# Patient Record
Sex: FEMALE | Race: BLACK OR AFRICAN AMERICAN | NOT HISPANIC OR LATINO | URBAN - METROPOLITAN AREA
[De-identification: names, ages, dates, MRNs, and addresses within clinical notes are randomized per-mention and may not be internally consistent; named-entity substitution may affect disease eponyms.]

---

## 2017-03-05 ENCOUNTER — EMERGENCY (EMERGENCY)
Facility: HOSPITAL | Age: 47
LOS: 1 days | Discharge: HOME | End: 2017-03-05
Attending: EMERGENCY MEDICINE
Payer: MEDICAID

## 2017-03-05 VITALS
HEART RATE: 81 BPM | RESPIRATION RATE: 16 BRPM | DIASTOLIC BLOOD PRESSURE: 82 MMHG | SYSTOLIC BLOOD PRESSURE: 132 MMHG | OXYGEN SATURATION: 99 % | TEMPERATURE: 98 F

## 2017-03-05 VITALS
WEIGHT: 199.96 LBS | TEMPERATURE: 98 F | HEIGHT: 62 IN | DIASTOLIC BLOOD PRESSURE: 80 MMHG | RESPIRATION RATE: 20 BRPM | OXYGEN SATURATION: 99 % | SYSTOLIC BLOOD PRESSURE: 150 MMHG | HEART RATE: 83 BPM

## 2017-03-05 DIAGNOSIS — R10.13 EPIGASTRIC PAIN: ICD-10-CM

## 2017-03-05 DIAGNOSIS — Z98.89 OTHER SPECIFIED POSTPROCEDURAL STATES: Chronic | ICD-10-CM

## 2017-03-05 DIAGNOSIS — K29.70 GASTRITIS, UNSPECIFIED, WITHOUT BLEEDING: ICD-10-CM

## 2017-03-05 DIAGNOSIS — Z98.51 TUBAL LIGATION STATUS: Chronic | ICD-10-CM

## 2017-03-05 DIAGNOSIS — Z88.8 ALLERGY STATUS TO OTHER DRUGS, MEDICAMENTS AND BIOLOGICAL SUBSTANCES STATUS: ICD-10-CM

## 2017-03-05 DIAGNOSIS — F32.9 MAJOR DEPRESSIVE DISORDER, SINGLE EPISODE, UNSPECIFIED: ICD-10-CM

## 2017-03-05 LAB
ALBUMIN SERPL ELPH-MCNC: 4.1 G/DL — SIGNIFICANT CHANGE UP (ref 3.3–5.2)
ALP SERPL-CCNC: 120 U/L — SIGNIFICANT CHANGE UP (ref 40–120)
ALT FLD-CCNC: 20 U/L — SIGNIFICANT CHANGE UP
ANION GAP SERPL CALC-SCNC: 13 MMOL/L — SIGNIFICANT CHANGE UP (ref 5–17)
APPEARANCE UR: CLEAR — SIGNIFICANT CHANGE UP
AST SERPL-CCNC: 20 U/L — SIGNIFICANT CHANGE UP
BACTERIA # UR AUTO: ABNORMAL
BASOPHILS # BLD AUTO: 0 K/UL — SIGNIFICANT CHANGE UP (ref 0–0.2)
BASOPHILS NFR BLD AUTO: 0.7 % — SIGNIFICANT CHANGE UP (ref 0–2)
BILIRUB SERPL-MCNC: 0.2 MG/DL — LOW (ref 0.4–2)
BILIRUB UR-MCNC: NEGATIVE — SIGNIFICANT CHANGE UP
BUN SERPL-MCNC: 11 MG/DL — SIGNIFICANT CHANGE UP (ref 8–20)
CALCIUM SERPL-MCNC: 9.4 MG/DL — SIGNIFICANT CHANGE UP (ref 8.6–10.2)
CHLORIDE SERPL-SCNC: 103 MMOL/L — SIGNIFICANT CHANGE UP (ref 98–107)
CO2 SERPL-SCNC: 25 MMOL/L — SIGNIFICANT CHANGE UP (ref 22–29)
COLOR SPEC: YELLOW — SIGNIFICANT CHANGE UP
CREAT SERPL-MCNC: 0.79 MG/DL — SIGNIFICANT CHANGE UP (ref 0.5–1.3)
DIFF PNL FLD: NEGATIVE — SIGNIFICANT CHANGE UP
EOSINOPHIL # BLD AUTO: 0.3 K/UL — SIGNIFICANT CHANGE UP (ref 0–0.5)
EOSINOPHIL NFR BLD AUTO: 3.9 % — SIGNIFICANT CHANGE UP (ref 0–6)
EPI CELLS # UR: SIGNIFICANT CHANGE UP
GLUCOSE SERPL-MCNC: 114 MG/DL — SIGNIFICANT CHANGE UP (ref 70–115)
GLUCOSE UR QL: NEGATIVE MG/DL — SIGNIFICANT CHANGE UP
HCG UR QL: NEGATIVE — SIGNIFICANT CHANGE UP
HCT VFR BLD CALC: 37.2 % — SIGNIFICANT CHANGE UP (ref 37–47)
HGB BLD-MCNC: 11.4 G/DL — LOW (ref 12–16)
KETONES UR-MCNC: NEGATIVE — SIGNIFICANT CHANGE UP
LEUKOCYTE ESTERASE UR-ACNC: ABNORMAL
LIDOCAIN IGE QN: 48 U/L — SIGNIFICANT CHANGE UP (ref 22–51)
LYMPHOCYTES # BLD AUTO: 1.3 K/UL — SIGNIFICANT CHANGE UP (ref 1–4.8)
LYMPHOCYTES # BLD AUTO: 18.7 % — LOW (ref 20–55)
MCHC RBC-ENTMCNC: 23.8 PG — LOW (ref 27–31)
MCHC RBC-ENTMCNC: 30.6 G/DL — LOW (ref 32–36)
MCV RBC AUTO: 77.7 FL — LOW (ref 81–99)
MONOCYTES # BLD AUTO: 0.8 K/UL — SIGNIFICANT CHANGE UP (ref 0–0.8)
MONOCYTES NFR BLD AUTO: 11.5 % — HIGH (ref 3–10)
NEUTROPHILS # BLD AUTO: 4.5 K/UL — SIGNIFICANT CHANGE UP (ref 1.8–8)
NEUTROPHILS NFR BLD AUTO: 65.1 % — SIGNIFICANT CHANGE UP (ref 37–73)
NITRITE UR-MCNC: NEGATIVE — SIGNIFICANT CHANGE UP
PH UR: 7 — SIGNIFICANT CHANGE UP (ref 4.8–8)
PLATELET # BLD AUTO: 364 K/UL — SIGNIFICANT CHANGE UP (ref 150–400)
POTASSIUM SERPL-MCNC: 4.3 MMOL/L — SIGNIFICANT CHANGE UP (ref 3.5–5.3)
POTASSIUM SERPL-SCNC: 4.3 MMOL/L — SIGNIFICANT CHANGE UP (ref 3.5–5.3)
PROT SERPL-MCNC: 7.5 G/DL — SIGNIFICANT CHANGE UP (ref 6.6–8.7)
PROT UR-MCNC: NEGATIVE MG/DL — SIGNIFICANT CHANGE UP
RBC # BLD: 4.79 M/UL — SIGNIFICANT CHANGE UP (ref 4.4–5.2)
RBC # FLD: 18.7 % — HIGH (ref 11–15.6)
SODIUM SERPL-SCNC: 141 MMOL/L — SIGNIFICANT CHANGE UP (ref 135–145)
SP GR SPEC: 1.01 — SIGNIFICANT CHANGE UP (ref 1.01–1.02)
UROBILINOGEN FLD QL: NEGATIVE MG/DL — SIGNIFICANT CHANGE UP
WBC # BLD: 6.9 K/UL — SIGNIFICANT CHANGE UP (ref 4.8–10.8)
WBC # FLD AUTO: 6.9 K/UL — SIGNIFICANT CHANGE UP (ref 4.8–10.8)
WBC UR QL: SIGNIFICANT CHANGE UP

## 2017-03-05 PROCEDURE — 76705 ECHO EXAM OF ABDOMEN: CPT | Mod: 26

## 2017-03-05 PROCEDURE — 81025 URINE PREGNANCY TEST: CPT

## 2017-03-05 PROCEDURE — 80053 COMPREHEN METABOLIC PANEL: CPT

## 2017-03-05 PROCEDURE — 81001 URINALYSIS AUTO W/SCOPE: CPT

## 2017-03-05 PROCEDURE — 76705 ECHO EXAM OF ABDOMEN: CPT

## 2017-03-05 PROCEDURE — 99284 EMERGENCY DEPT VISIT MOD MDM: CPT | Mod: 25

## 2017-03-05 PROCEDURE — 96376 TX/PRO/DX INJ SAME DRUG ADON: CPT | Mod: XU

## 2017-03-05 PROCEDURE — 83690 ASSAY OF LIPASE: CPT

## 2017-03-05 PROCEDURE — 96375 TX/PRO/DX INJ NEW DRUG ADDON: CPT | Mod: XU

## 2017-03-05 PROCEDURE — 74177 CT ABD & PELVIS W/CONTRAST: CPT

## 2017-03-05 PROCEDURE — 99285 EMERGENCY DEPT VISIT HI MDM: CPT

## 2017-03-05 PROCEDURE — 74177 CT ABD & PELVIS W/CONTRAST: CPT | Mod: 26

## 2017-03-05 PROCEDURE — 96374 THER/PROPH/DIAG INJ IV PUSH: CPT | Mod: XU

## 2017-03-05 PROCEDURE — 85027 COMPLETE CBC AUTOMATED: CPT

## 2017-03-05 RX ORDER — MORPHINE SULFATE 50 MG/1
6 CAPSULE, EXTENDED RELEASE ORAL ONCE
Qty: 0 | Refills: 0 | Status: DISCONTINUED | OUTPATIENT
Start: 2017-03-05 | End: 2017-03-05

## 2017-03-05 RX ORDER — FAMOTIDINE 10 MG/ML
20 INJECTION INTRAVENOUS ONCE
Qty: 0 | Refills: 0 | Status: COMPLETED | OUTPATIENT
Start: 2017-03-05 | End: 2017-03-05

## 2017-03-05 RX ORDER — SUCRALFATE 1 G
10 TABLET ORAL
Qty: 300 | Refills: 0
Start: 2017-03-05 | End: 2017-03-15

## 2017-03-05 RX ORDER — SODIUM CHLORIDE 9 MG/ML
3 INJECTION INTRAMUSCULAR; INTRAVENOUS; SUBCUTANEOUS ONCE
Qty: 0 | Refills: 0 | Status: COMPLETED | OUTPATIENT
Start: 2017-03-05 | End: 2017-03-05

## 2017-03-05 RX ORDER — ONDANSETRON 8 MG/1
4 TABLET, FILM COATED ORAL ONCE
Qty: 0 | Refills: 0 | Status: COMPLETED | OUTPATIENT
Start: 2017-03-05 | End: 2017-03-05

## 2017-03-05 RX ORDER — OXYCODONE AND ACETAMINOPHEN 5; 325 MG/1; MG/1
1 TABLET ORAL
Qty: 20 | Refills: 0
Start: 2017-03-05 | End: 2017-03-10

## 2017-03-05 RX ORDER — SODIUM CHLORIDE 9 MG/ML
1000 INJECTION INTRAMUSCULAR; INTRAVENOUS; SUBCUTANEOUS ONCE
Qty: 0 | Refills: 0 | Status: COMPLETED | OUTPATIENT
Start: 2017-03-05 | End: 2017-03-05

## 2017-03-05 RX ADMIN — ONDANSETRON 4 MILLIGRAM(S): 8 TABLET, FILM COATED ORAL at 20:14

## 2017-03-05 RX ADMIN — SODIUM CHLORIDE 3 MILLILITER(S): 9 INJECTION INTRAMUSCULAR; INTRAVENOUS; SUBCUTANEOUS at 13:10

## 2017-03-05 RX ADMIN — FAMOTIDINE 20 MILLIGRAM(S): 10 INJECTION INTRAVENOUS at 13:20

## 2017-03-05 RX ADMIN — ONDANSETRON 4 MILLIGRAM(S): 8 TABLET, FILM COATED ORAL at 13:20

## 2017-03-05 RX ADMIN — MORPHINE SULFATE 6 MILLIGRAM(S): 50 CAPSULE, EXTENDED RELEASE ORAL at 20:29

## 2017-03-05 RX ADMIN — SODIUM CHLORIDE 1000 MILLILITER(S): 9 INJECTION INTRAMUSCULAR; INTRAVENOUS; SUBCUTANEOUS at 13:10

## 2017-03-05 RX ADMIN — MORPHINE SULFATE 6 MILLIGRAM(S): 50 CAPSULE, EXTENDED RELEASE ORAL at 13:36

## 2017-03-05 RX ADMIN — MORPHINE SULFATE 6 MILLIGRAM(S): 50 CAPSULE, EXTENDED RELEASE ORAL at 20:14

## 2017-03-05 RX ADMIN — MORPHINE SULFATE 6 MILLIGRAM(S): 50 CAPSULE, EXTENDED RELEASE ORAL at 13:51

## 2017-03-05 NOTE — ED PROVIDER NOTE - PROGRESS NOTE DETAILS
Labs and UA as noted.  Pt developed recurrent pain after eating crackers and gingerale.  will check RUQ Ultrasound US results as  noted.  Pt with recurrent pain.  will re-medicate for pain and check CT abd/pelvis endorsed to me @ [id number] to follow up CT - if neg, d/c home otherwise dispo as appropriate. abd soft/nt  stable for discharge with GI follow up  d/c with carafate and analgesics  not exertional - does not appear to be cardiac

## 2017-03-05 NOTE — ED ADULT NURSE NOTE - PSH
S/P appendectomy    S/P  section  1993  S/P rotator cuff repair  Right shoulder ()  S/P thoracentesis    S/P tonsillectomy    S/P tubal ligation  10/1995

## 2017-03-05 NOTE — ED ADULT NURSE NOTE - OBJECTIVE STATEMENT
pt biba, a/o x 3, complaining of abdominal pain and nausea since last night, no urinary problems, no vomiting.

## 2017-03-05 NOTE — ED PROVIDER NOTE - CONSTITUTIONAL, MLM
normal... Well appearing, well nourished, awake, alert, oriented to person, place, time/situation and apears to be in moderate pain

## 2017-03-05 NOTE — ED ADULT TRIAGE NOTE - CHIEF COMPLAINT QUOTE
pt biba, a/o x 3, complaining of abdominal pain and nausea since last night, no urinary problems, no vomiting

## 2018-01-29 ENCOUNTER — OUTPATIENT (OUTPATIENT)
Dept: OUTPATIENT SERVICES | Facility: HOSPITAL | Age: 48
LOS: 1 days | End: 2018-01-29
Payer: MEDICAID

## 2018-01-29 DIAGNOSIS — Z98.89 OTHER SPECIFIED POSTPROCEDURAL STATES: Chronic | ICD-10-CM

## 2018-01-29 DIAGNOSIS — Z98.51 TUBAL LIGATION STATUS: Chronic | ICD-10-CM

## 2018-01-29 DIAGNOSIS — M54.16 RADICULOPATHY, LUMBAR REGION: ICD-10-CM

## 2018-01-29 PROCEDURE — 62323 NJX INTERLAMINAR LMBR/SAC: CPT

## 2018-01-29 PROCEDURE — 77003 FLUOROGUIDE FOR SPINE INJECT: CPT

## 2018-03-02 ENCOUNTER — OUTPATIENT (OUTPATIENT)
Dept: OUTPATIENT SERVICES | Facility: HOSPITAL | Age: 48
LOS: 1 days | End: 2018-03-02
Payer: MEDICAID

## 2018-03-02 DIAGNOSIS — Z98.89 OTHER SPECIFIED POSTPROCEDURAL STATES: Chronic | ICD-10-CM

## 2018-03-02 DIAGNOSIS — Z98.51 TUBAL LIGATION STATUS: Chronic | ICD-10-CM

## 2018-03-02 DIAGNOSIS — M54.16 RADICULOPATHY, LUMBAR REGION: ICD-10-CM

## 2018-03-02 PROCEDURE — 77003 FLUOROGUIDE FOR SPINE INJECT: CPT

## 2018-03-02 PROCEDURE — 62323 NJX INTERLAMINAR LMBR/SAC: CPT

## 2018-05-11 ENCOUNTER — OUTPATIENT (OUTPATIENT)
Dept: OUTPATIENT SERVICES | Facility: HOSPITAL | Age: 48
LOS: 1 days | End: 2018-05-11
Payer: MEDICAID

## 2018-05-11 DIAGNOSIS — M54.16 RADICULOPATHY, LUMBAR REGION: ICD-10-CM

## 2018-05-11 DIAGNOSIS — Z98.89 OTHER SPECIFIED POSTPROCEDURAL STATES: Chronic | ICD-10-CM

## 2018-05-11 DIAGNOSIS — Z98.51 TUBAL LIGATION STATUS: Chronic | ICD-10-CM

## 2018-05-11 PROCEDURE — 77003 FLUOROGUIDE FOR SPINE INJECT: CPT

## 2018-05-11 PROCEDURE — 62323 NJX INTERLAMINAR LMBR/SAC: CPT

## 2019-05-02 ENCOUNTER — EMERGENCY (EMERGENCY)
Facility: HOSPITAL | Age: 49
LOS: 1 days | Discharge: HOME | End: 2019-05-02
Attending: EMERGENCY MEDICINE
Payer: MEDICAID

## 2019-05-02 VITALS
HEIGHT: 62 IN | DIASTOLIC BLOOD PRESSURE: 79 MMHG | SYSTOLIC BLOOD PRESSURE: 118 MMHG | TEMPERATURE: 99 F | WEIGHT: 184.97 LBS | OXYGEN SATURATION: 99 % | HEART RATE: 71 BPM | RESPIRATION RATE: 18 BRPM

## 2019-05-02 DIAGNOSIS — Z98.51 TUBAL LIGATION STATUS: Chronic | ICD-10-CM

## 2019-05-02 DIAGNOSIS — Z98.89 OTHER SPECIFIED POSTPROCEDURAL STATES: Chronic | ICD-10-CM

## 2019-05-02 PROCEDURE — 99282 EMERGENCY DEPT VISIT SF MDM: CPT

## 2019-05-02 RX ORDER — OXYCODONE AND ACETAMINOPHEN 5; 325 MG/1; MG/1
1 TABLET ORAL ONCE
Qty: 0 | Refills: 0 | Status: DISCONTINUED | OUTPATIENT
Start: 2019-05-02 | End: 2019-05-02

## 2019-05-02 RX ORDER — OXYCODONE AND ACETAMINOPHEN 5; 325 MG/1; MG/1
1 TABLET ORAL
Qty: 9 | Refills: 0
Start: 2019-05-02 | End: 2019-05-04

## 2019-05-02 RX ADMIN — OXYCODONE AND ACETAMINOPHEN 1 TABLET(S): 5; 325 TABLET ORAL at 20:07

## 2019-05-02 NOTE — ED STATDOCS - CLINICAL SUMMARY MEDICAL DECISION MAKING FREE TEXT BOX
Patient with one small external hemorrhoid. Educated patient on how to treat hemorrhoids with OTC medication. Patient will be given GI follow up.

## 2019-05-02 NOTE — ED STATDOCS - OBJECTIVE STATEMENT
50 y/o F with PMHx of anemia, depression, migraine, vertigo, presents to ED c/o rectal bleeding, onset of 3 days. Pain in rectum area began 5 days ago and then bleeding began 2 days later. Patient reports feeling a bump when wiping and blood is only visible when wiping and blood is not seen in stool. Patient was at [facility] this morning and they concluded patient had a thrombose hemorrhoid. Patient does not have a history of hemorrhoids. She has recently began taking iron supplements. Denies constipation, abd pain, N/V/D. Patient cannot take Motrin.

## 2019-05-02 NOTE — ED STATDOCS - ABDOMEN FINDINGS, MLM
soft/abdomen soft, non-tender, and non-distended. Bowel sounds present. Small External hemorrhoid. No active bleeding.  No thrombus noted.

## 2019-05-02 NOTE — ED ADULT NURSE REASSESSMENT NOTE - NS ED NURSE REASSESS COMMENT FT1
pt seen, evaluated and discharged by provider, pt verbalized understanding of discharge instructions, pt medicated prior to discharge, refer to provider notes for assessment.

## 2019-08-08 ENCOUNTER — APPOINTMENT (OUTPATIENT)
Dept: NEUROLOGY | Facility: CLINIC | Age: 49
End: 2019-08-08
Payer: MEDICAID

## 2019-08-08 VITALS
SYSTOLIC BLOOD PRESSURE: 100 MMHG | BODY MASS INDEX: 32.76 KG/M2 | WEIGHT: 178 LBS | HEIGHT: 62 IN | DIASTOLIC BLOOD PRESSURE: 60 MMHG

## 2019-08-08 DIAGNOSIS — Z87.891 PERSONAL HISTORY OF NICOTINE DEPENDENCE: ICD-10-CM

## 2019-08-08 PROCEDURE — 99204 OFFICE O/P NEW MOD 45 MIN: CPT

## 2019-08-15 ENCOUNTER — EMERGENCY (EMERGENCY)
Facility: HOSPITAL | Age: 49
LOS: 1 days | Discharge: HOME | End: 2019-08-15
Attending: EMERGENCY MEDICINE
Payer: MEDICAID

## 2019-08-15 VITALS
OXYGEN SATURATION: 98 % | RESPIRATION RATE: 18 BRPM | DIASTOLIC BLOOD PRESSURE: 54 MMHG | HEIGHT: 62 IN | WEIGHT: 177.91 LBS | SYSTOLIC BLOOD PRESSURE: 93 MMHG | TEMPERATURE: 98 F | HEART RATE: 100 BPM

## 2019-08-15 DIAGNOSIS — Z98.89 OTHER SPECIFIED POSTPROCEDURAL STATES: Chronic | ICD-10-CM

## 2019-08-15 DIAGNOSIS — Z98.51 TUBAL LIGATION STATUS: Chronic | ICD-10-CM

## 2019-08-15 LAB
ALBUMIN SERPL ELPH-MCNC: 3.9 G/DL — SIGNIFICANT CHANGE UP (ref 3.3–5.2)
ALP SERPL-CCNC: 136 U/L — HIGH (ref 40–120)
ALT FLD-CCNC: 12 U/L — SIGNIFICANT CHANGE UP
ANION GAP SERPL CALC-SCNC: 11 MMOL/L — SIGNIFICANT CHANGE UP (ref 5–17)
APPEARANCE UR: CLEAR — SIGNIFICANT CHANGE UP
APTT BLD: 36.6 SEC — HIGH (ref 27.5–36.3)
AST SERPL-CCNC: 20 U/L — SIGNIFICANT CHANGE UP
BACTERIA # UR AUTO: ABNORMAL
BASOPHILS # BLD AUTO: 0.06 K/UL — SIGNIFICANT CHANGE UP (ref 0–0.2)
BASOPHILS NFR BLD AUTO: 0.6 % — SIGNIFICANT CHANGE UP (ref 0–2)
BILIRUB SERPL-MCNC: 0.2 MG/DL — LOW (ref 0.4–2)
BILIRUB UR-MCNC: NEGATIVE — SIGNIFICANT CHANGE UP
BUN SERPL-MCNC: 8 MG/DL — SIGNIFICANT CHANGE UP (ref 8–20)
CALCIUM SERPL-MCNC: 9.2 MG/DL — SIGNIFICANT CHANGE UP (ref 8.6–10.2)
CHLORIDE SERPL-SCNC: 107 MMOL/L — SIGNIFICANT CHANGE UP (ref 98–107)
CO2 SERPL-SCNC: 22 MMOL/L — SIGNIFICANT CHANGE UP (ref 22–29)
COD CRY URNS QL: ABNORMAL
COLOR SPEC: YELLOW — SIGNIFICANT CHANGE UP
CREAT SERPL-MCNC: 0.84 MG/DL — SIGNIFICANT CHANGE UP (ref 0.5–1.3)
DIFF PNL FLD: NEGATIVE — SIGNIFICANT CHANGE UP
EOSINOPHIL # BLD AUTO: 0.06 K/UL — SIGNIFICANT CHANGE UP (ref 0–0.5)
EOSINOPHIL NFR BLD AUTO: 0.6 % — SIGNIFICANT CHANGE UP (ref 0–6)
EPI CELLS # UR: SIGNIFICANT CHANGE UP
GLUCOSE SERPL-MCNC: 83 MG/DL — SIGNIFICANT CHANGE UP (ref 70–115)
GLUCOSE UR QL: NEGATIVE MG/DL — SIGNIFICANT CHANGE UP
HCG SERPL-ACNC: <4 MIU/ML — SIGNIFICANT CHANGE UP
HCT VFR BLD CALC: 39.7 % — SIGNIFICANT CHANGE UP (ref 34.5–45)
HGB BLD-MCNC: 12.1 G/DL — SIGNIFICANT CHANGE UP (ref 11.5–15.5)
IMM GRANULOCYTES NFR BLD AUTO: 0.2 % — SIGNIFICANT CHANGE UP (ref 0–1.5)
INR BLD: 1.03 RATIO — SIGNIFICANT CHANGE UP (ref 0.88–1.16)
KETONES UR-MCNC: ABNORMAL
LEUKOCYTE ESTERASE UR-ACNC: ABNORMAL
LYMPHOCYTES # BLD AUTO: 4.05 K/UL — HIGH (ref 1–3.3)
LYMPHOCYTES # BLD AUTO: 41.9 % — SIGNIFICANT CHANGE UP (ref 13–44)
MCHC RBC-ENTMCNC: 23.5 PG — LOW (ref 27–34)
MCHC RBC-ENTMCNC: 30.5 GM/DL — LOW (ref 32–36)
MCV RBC AUTO: 77.2 FL — LOW (ref 80–100)
MONOCYTES # BLD AUTO: 0.75 K/UL — SIGNIFICANT CHANGE UP (ref 0–0.9)
MONOCYTES NFR BLD AUTO: 7.8 % — SIGNIFICANT CHANGE UP (ref 2–14)
NEUTROPHILS # BLD AUTO: 4.73 K/UL — SIGNIFICANT CHANGE UP (ref 1.8–7.4)
NEUTROPHILS NFR BLD AUTO: 48.9 % — SIGNIFICANT CHANGE UP (ref 43–77)
NITRITE UR-MCNC: POSITIVE
PH UR: 6 — SIGNIFICANT CHANGE UP (ref 5–8)
PLATELET # BLD AUTO: 469 K/UL — HIGH (ref 150–400)
POTASSIUM SERPL-MCNC: 3.9 MMOL/L — SIGNIFICANT CHANGE UP (ref 3.5–5.3)
POTASSIUM SERPL-SCNC: 3.9 MMOL/L — SIGNIFICANT CHANGE UP (ref 3.5–5.3)
PROT SERPL-MCNC: 7.4 G/DL — SIGNIFICANT CHANGE UP (ref 6.6–8.7)
PROT UR-MCNC: 15 MG/DL
PROTHROM AB SERPL-ACNC: 11.8 SEC — SIGNIFICANT CHANGE UP (ref 10–12.9)
RBC # BLD: 5.14 M/UL — SIGNIFICANT CHANGE UP (ref 3.8–5.2)
RBC # FLD: 18.2 % — HIGH (ref 10.3–14.5)
RBC CASTS # UR COMP ASSIST: SIGNIFICANT CHANGE UP /HPF (ref 0–4)
SODIUM SERPL-SCNC: 140 MMOL/L — SIGNIFICANT CHANGE UP (ref 135–145)
SP GR SPEC: 1.01 — SIGNIFICANT CHANGE UP (ref 1.01–1.02)
UROBILINOGEN FLD QL: 1 MG/DL
WBC # BLD: 9.67 K/UL — SIGNIFICANT CHANGE UP (ref 3.8–10.5)
WBC # FLD AUTO: 9.67 K/UL — SIGNIFICANT CHANGE UP (ref 3.8–10.5)
WBC UR QL: ABNORMAL

## 2019-08-15 PROCEDURE — 99285 EMERGENCY DEPT VISIT HI MDM: CPT

## 2019-08-15 PROCEDURE — 72148 MRI LUMBAR SPINE W/O DYE: CPT | Mod: 26

## 2019-08-15 PROCEDURE — 99283 EMERGENCY DEPT VISIT LOW MDM: CPT

## 2019-08-15 PROCEDURE — 74177 CT ABD & PELVIS W/CONTRAST: CPT | Mod: 26

## 2019-08-15 PROCEDURE — 72131 CT LUMBAR SPINE W/O DYE: CPT | Mod: 26

## 2019-08-15 RX ORDER — HYDROMORPHONE HYDROCHLORIDE 2 MG/ML
1 INJECTION INTRAMUSCULAR; INTRAVENOUS; SUBCUTANEOUS ONCE
Refills: 0 | Status: DISCONTINUED | OUTPATIENT
Start: 2019-08-15 | End: 2019-08-15

## 2019-08-15 RX ORDER — DEXAMETHASONE 0.5 MG/5ML
10 ELIXIR ORAL ONCE
Refills: 0 | Status: COMPLETED | OUTPATIENT
Start: 2019-08-15 | End: 2019-08-15

## 2019-08-15 RX ORDER — MORPHINE SULFATE 50 MG/1
2 CAPSULE, EXTENDED RELEASE ORAL ONCE
Refills: 0 | Status: DISCONTINUED | OUTPATIENT
Start: 2019-08-15 | End: 2019-08-15

## 2019-08-15 RX ORDER — SODIUM CHLORIDE 9 MG/ML
1000 INJECTION INTRAMUSCULAR; INTRAVENOUS; SUBCUTANEOUS ONCE
Refills: 0 | Status: COMPLETED | OUTPATIENT
Start: 2019-08-15 | End: 2019-08-15

## 2019-08-15 RX ADMIN — MORPHINE SULFATE 2 MILLIGRAM(S): 50 CAPSULE, EXTENDED RELEASE ORAL at 19:29

## 2019-08-15 RX ADMIN — SODIUM CHLORIDE 1000 MILLILITER(S): 9 INJECTION INTRAMUSCULAR; INTRAVENOUS; SUBCUTANEOUS at 19:28

## 2019-08-15 RX ADMIN — SODIUM CHLORIDE 1000 MILLILITER(S): 9 INJECTION INTRAMUSCULAR; INTRAVENOUS; SUBCUTANEOUS at 21:52

## 2019-08-15 RX ADMIN — HYDROMORPHONE HYDROCHLORIDE 1 MILLIGRAM(S): 2 INJECTION INTRAMUSCULAR; INTRAVENOUS; SUBCUTANEOUS at 22:52

## 2019-08-15 RX ADMIN — Medication 10 MILLIGRAM(S): at 21:52

## 2019-08-15 RX ADMIN — MORPHINE SULFATE 2 MILLIGRAM(S): 50 CAPSULE, EXTENDED RELEASE ORAL at 20:52

## 2019-08-15 RX ADMIN — Medication 102 MILLIGRAM(S): at 20:21

## 2019-08-15 NOTE — ED ADULT NURSE REASSESSMENT NOTE - NS ED NURSE REASSESS COMMENT FT1
Received report from CINDI Pryor. Pt aox4, breathing equal and unlabored, color good. Pt POC explained and verbalized understanding. Pt awaiting CT.

## 2019-08-15 NOTE — ED STATDOCS - PROGRESS NOTE DETAILS
Ace Cordova for Dr Bob Mcdaniel : pt is a 50 y/o F, with PMHx oanemia, carpal tunnel syndrome, depression, migraines, vertigo, cervical and lumbar disc disease, presenting to the ED with c/o bladder and bowel incontinence. Pt states her chronic lower back pain has been worsening over the last one month. She states the first episode of bowel incontinence occurred about 3 weeks ago, where she was talking on the phone and noticed the odor. She reports since then, having 3 episodes of incontinence. She also notes that she has been having new LE weakness, right worse than left, over the last three weeks as well. No new traumas. Pt went to her pain management today regarding the new pain, endorsed her sxs of incontinence, and advised to come to the ED for neurosurgery consult. Sent to main for further evaluation.   [facility]- pain management.

## 2019-08-15 NOTE — ED PROVIDER NOTE - CARE PLAN
Principal Discharge DX:	Exacerbation of chronic back pain  Secondary Diagnosis:	UTI (urinary tract infection)

## 2019-08-15 NOTE — CONSULT NOTE ADULT - CONSULT REASON
There is an asymmetrically bulging annulus and possible subligamentous disc   herniation at the L5 S1 level which may affect the left S1 and L5 nerve   roots   however neither appear to be compressed.
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
## 2019-08-15 NOTE — ED PROVIDER NOTE - PHYSICAL EXAMINATION
Gen: no acute disease  Head: normocephalic, atraumatic  Lung: CTAB, no respiratory distress, no wheezing, rales, rhonchi  CV: normal s1/s2, rrr, no murmurs, Normal perfusion  Abd: soft, non-distended, RLQ tenderness,   MSK: No edema, no visible deformities, marked weakness to RLE  Back: lumbar midline and paraspinal tenderness  Neuro: decreased sensory and motor function to RLE, anal wink reflex intact, +rectal tone  Skin: No rash   Psych: normal affect

## 2019-08-15 NOTE — ED ADULT NURSE NOTE - OBJECTIVE STATEMENT
Pt went to pain management DR for check up today, had mentioned to him that he lost her bowls 2x in 3 weeks, hx of recent falls, AOx4, able to WHALEN, resp even and unlabored, does not take pain medication but has received cortisone epidurals in past, was sent to ED to see neurosurgery

## 2019-08-15 NOTE — ED PROVIDER NOTE - NS ED ROS FT
Constitutional: (-) fever  (-)chills  (-)sweats  Eyes/ENT: (-) blurry vision, (-) epistaxis  (-)rhinorrhea   (-) sore throat    Cardiovascular: (-) chest pain, (-) palpitations (-) edema   Respiratory: (-) cough, (-) shortness of breath   Gastrointestinal: (-)nausea  (-)vomiting, (-) diarrhea  (-) abdominal pain   :  (-)dysuria, (-)frequency, (-)urgency, (-)hematuria  Musculoskeletal: (-) neck pain, (-) back pain, (-) joint pain  Integumentary: (-) rash, (-) edema  Neurological: (-) headache, (-) altered mental status  (-)LOC  +paresthesia

## 2019-08-15 NOTE — ED PROVIDER NOTE - CLINICAL SUMMARY MEDICAL DECISION MAKING FREE TEXT BOX
50 y/o f with chronic back pain that had an acute worsening of pain with associated incontinence, weakness, numbness, and tingling after a day of heavy lifting. Exam showed intact rectal tone, but marked weakness in RLE. MRI lumbar spine ordered to evaluate for cauda equina syndrome or epidural abscess.

## 2019-08-15 NOTE — ED PROVIDER NOTE - PROGRESS NOTE DETAILS
Neurosurgery consult appreciated.  Pt improved with meds and feels well for d/c.  will d/c on Percocet, Medrol dosepak and Keflex for UTI

## 2019-08-15 NOTE — ED ADULT TRIAGE NOTE - CHIEF COMPLAINT QUOTE
Pt ambulatory in ER told by her pain management MD to come to ER for neurosurgery consult due to LE weakness and loss of control of bowel/bladder.

## 2019-08-15 NOTE — ED PROVIDER NOTE - ATTENDING CONTRIBUTION TO CARE
49yoF; with pmh signif for Chronic lumbar disc disease; now p/w worsening of back pain s/p heavy lifting 3 weeks ago.  c/o saddle paresthesia and worsening right leg paresthesia.  states had 1 episode of bowel incontinence 3 weeks ago and 1 episode of urinary incontinence.  denies direct trauma.  EXAM:  General:     NAD, well-nourished, well-appearing  Head:     NC/AT, EOMI, oral mucosa moist  Neck:     trachea midline  Lungs:     CTA b/l, no w/r/r  CVS:     S1S2, RRR, no m/g/r  Abd:     +BS, s/nt/nd, no organomegaly  Rectal Exam: +anal wink, +anal tone  Ext:    2+ radial and pedal pulses, no c/c/e. strength/sensation reflexes over b/l LE intact.  Neuro: AAOx3, no sensory/motor deficits\  A/P: 49yoF p/w saddle paresthesia--  -mr/ct to evaluate for cauda equina  -labs  -nsg/spine consult

## 2019-08-15 NOTE — ED PROVIDER NOTE - OBJECTIVE STATEMENT
48 y/o F with hx of chronic low back pain who presents today with cc of bowel & bladder incontinence. Pt states that 3 weeks ago she went to a picnic and was lifting coolers when she had an exacerbation of her lower back pain. The next day she had an episode of bowel incontinence. Then 3 days ago the pt had an episode of bladder incontinence and she was told to come to the ED by her pain specialist. In addition, the pt is c/o R upper leg numbness and weakness that began after the picnic 3 weeks ago. Other than the 2 episodes of incontinence, the pt denies any other episodes.

## 2019-08-15 NOTE — CONSULT NOTE ADULT - ASSESSMENT
IMP:   There is an asymmetrically bulging annulus and possible subligamentous disc   herniation at the L5 S1 level which may affect the left S1 and L5 nerve   roots   however neither appear to be compressed. IMP: Patient presents with right sacral pain, decreased sensation RLE noticed in ED, Hx of isolated episode incontinence of watery stool 3 wks ago and isolated episode of urine incontinence 3 days ago.   CT L SPINE: There is an asymmetrically bulging annulus and possible subligamentous disc   herniation at the L5 S1 level which may affect the left S1 and L5 nerve   roots however neither appear to be compressed.     Exam significant for C 5-6 tenderness and right sacral tenderness, RLE very slightly weaker 2nd to sacral pain, full circumference hip to toes with normal sensory buttock, and anal with normal rectal tone.  RLQ pain with rebound tenderness.   Patient states she is ambulatory without lugo or walker    Plan:  Neurology consult or f/u outpatient with her neurologist  Surg consult for RLQ pain and rebound tenderness  Medrol dose pack and pain meds, f/u with her pain management MD    If patient does not have sufficient pain relief in ED, can admit for pain management and consult.   decadron 4 mg q6h, PT eval IMP: Patient presents with right sacral pain, decreased sensation RLE noticed in ED, Hx of isolated episode incontinence of watery stool 3 wks ago and isolated episode of urine incontinence 3 days ago.   CT L SPINE: There is an asymmetrically bulging annulus and possible subligamentous disc   herniation at the L5 S1 level which may affect the left S1 and L5 nerve   roots however neither appear to be compressed.     Exam significant for C 5-6 tenderness and right sacral tenderness, RLE very slightly weaker 2nd to sacral pain, full circumference hip to toes with normal sensory buttock, and anal with normal rectal tone.  RLQ pain with rebound tenderness.   Patient states she is ambulatory without lugo or walker  UTI  Plan:  Neurology consult or f/u outpatient with her neurologist  Surg consult for RLQ pain and rebound tenderness  Medrol dose pack and pain meds, f/u with her pain management MD  F/U with DR Olivo neurosurgery out patient )    Urine cult and treat UTI    If patient does not have sufficient pain relief in ED, can admit for pain management and consult.   decadron 4 mg q6h, PT eval IMP: Patient presents with right sacral pain, decreased sensation RLE noticed in ED, Hx of isolated episode incontinence of watery stool 3 wks ago and isolated episode of urine incontinence 3 days ago.   CT L SPINE: There is an asymmetrically bulging annulus and possible subligamentous disc   herniation at the L5 S1 level which may affect the left S1 and L5 nerve   roots however neither appear to be compressed.     Exam significant for C 5-6 tenderness and right sacral tenderness, RLE very slightly weaker 2nd to sacral pain, full circumference hip to toes with normal sensory buttock, and anal with normal rectal tone.  RLQ pain with rebound tenderness.   Patient states she is ambulatory without lugo or walker  UTI    Plan:  CT HEAD, AND C SPINE (NON CONTRAST)  Neurology consult or f/u outpatient with her neurologist  Surg consult for RLQ pain and rebound tenderness  Medrol dose pack and pain meds, f/u with her pain management MD  F/U with DR Olivo neurosurgery out patient )    Urine cult and treat UTI    If patient does not have sufficient pain relief in ED, can admit for pain management and consult.   decadron 4 mg q6h, PT eval IMP: Patient presents with right sacral pain, decreased sensation RLE noticed in ED, Hx of isolated episode incontinence of watery stool 3 wks ago and isolated episode of urine incontinence 3 days ago.   CT L SPINE: There is an asymmetrically bulging annulus and possible subligamentous disc   herniation at the L5 S1 level which may affect the left S1 and L5 nerve   roots however neither appear to be compressed.     Exam significant for C 5-6 tenderness and right sacral tenderness, RLE very slightly weaker 2nd to sacral pain, full circumference hip to toes with normal sensory buttock, and anal with normal rectal tone.  RLQ pain with rebound tenderness.   Patient states she is ambulatory without lugo or walker  UTI    Plan:  CT HEAD, AND C SPINE (NON CONTRAST)  Neurology consult or f/u outpatient with her neurologist  Surg consult for RLQ pain and rebound tenderness  Medrol dose pack and pain meds, f/u with her pain management MD  F/U with DR Olivo neurosurgery out patient     Urine cult and treat UTI    If patient does not have sufficient pain relief in ED, can admit for pain management and consult.   decadron 4 mg q6h, PT eval

## 2019-08-15 NOTE — ED ADULT NURSE NOTE - NSIMPLEMENTINTERV_GEN_ALL_ED
Implemented All Fall with Harm Risk Interventions:  [place] to call system. Call bell, personal items and telephone within reach. Instruct patient to call for assistance. Room bathroom lighting operational. Non-slip footwear when patient is off stretcher. Physically safe environment: no spills, clutter or unnecessary equipment. Stretcher in lowest position, wheels locked, appropriate side rails in place. Provide visual cue, wrist band, yellow gown, etc. Monitor gait and stability. Monitor for mental status changes and reorient to person, place, and time. Review medications for side effects contributing to fall risk. Reinforce activity limits and safety measures with patient and family. Provide visual clues: red socks.

## 2019-08-16 VITALS
TEMPERATURE: 98 F | DIASTOLIC BLOOD PRESSURE: 67 MMHG | SYSTOLIC BLOOD PRESSURE: 110 MMHG | OXYGEN SATURATION: 98 % | RESPIRATION RATE: 18 BRPM | HEART RATE: 74 BPM

## 2019-08-16 PROCEDURE — 96366 THER/PROPH/DIAG IV INF ADDON: CPT

## 2019-08-16 PROCEDURE — 72148 MRI LUMBAR SPINE W/O DYE: CPT

## 2019-08-16 PROCEDURE — 85610 PROTHROMBIN TIME: CPT

## 2019-08-16 PROCEDURE — 85027 COMPLETE CBC AUTOMATED: CPT

## 2019-08-16 PROCEDURE — 36415 COLL VENOUS BLD VENIPUNCTURE: CPT

## 2019-08-16 PROCEDURE — 72125 CT NECK SPINE W/O DYE: CPT | Mod: 26

## 2019-08-16 PROCEDURE — 96361 HYDRATE IV INFUSION ADD-ON: CPT

## 2019-08-16 PROCEDURE — 84702 CHORIONIC GONADOTROPIN TEST: CPT

## 2019-08-16 PROCEDURE — 70450 CT HEAD/BRAIN W/O DYE: CPT

## 2019-08-16 PROCEDURE — 80053 COMPREHEN METABOLIC PANEL: CPT

## 2019-08-16 PROCEDURE — 72125 CT NECK SPINE W/O DYE: CPT

## 2019-08-16 PROCEDURE — 74177 CT ABD & PELVIS W/CONTRAST: CPT

## 2019-08-16 PROCEDURE — 85730 THROMBOPLASTIN TIME PARTIAL: CPT

## 2019-08-16 PROCEDURE — 96375 TX/PRO/DX INJ NEW DRUG ADDON: CPT

## 2019-08-16 PROCEDURE — 81001 URINALYSIS AUTO W/SCOPE: CPT

## 2019-08-16 PROCEDURE — 99284 EMERGENCY DEPT VISIT MOD MDM: CPT | Mod: 25

## 2019-08-16 PROCEDURE — 72131 CT LUMBAR SPINE W/O DYE: CPT

## 2019-08-16 PROCEDURE — 96365 THER/PROPH/DIAG IV INF INIT: CPT | Mod: XU

## 2019-08-16 PROCEDURE — 70450 CT HEAD/BRAIN W/O DYE: CPT | Mod: 26

## 2019-08-16 RX ORDER — OXYCODONE AND ACETAMINOPHEN 5; 325 MG/1; MG/1
1 TABLET ORAL
Qty: 18 | Refills: 0
Start: 2019-08-16 | End: 2019-08-18

## 2019-08-16 RX ORDER — DIPHENHYDRAMINE HCL 50 MG
25 CAPSULE ORAL ONCE
Refills: 0 | Status: COMPLETED | OUTPATIENT
Start: 2019-08-16 | End: 2019-08-16

## 2019-08-16 RX ORDER — CEPHALEXIN 500 MG
1 CAPSULE ORAL
Qty: 14 | Refills: 0
Start: 2019-08-16 | End: 2019-08-22

## 2019-08-16 RX ORDER — CEPHALEXIN 500 MG
500 CAPSULE ORAL ONCE
Refills: 0 | Status: COMPLETED | OUTPATIENT
Start: 2019-08-16 | End: 2019-08-16

## 2019-08-16 RX ORDER — OXYCODONE AND ACETAMINOPHEN 5; 325 MG/1; MG/1
2 TABLET ORAL ONCE
Refills: 0 | Status: DISCONTINUED | OUTPATIENT
Start: 2019-08-16 | End: 2019-08-16

## 2019-08-16 RX ADMIN — OXYCODONE AND ACETAMINOPHEN 2 TABLET(S): 5; 325 TABLET ORAL at 03:22

## 2019-08-16 RX ADMIN — Medication 25 MILLIGRAM(S): at 05:17

## 2019-08-16 RX ADMIN — OXYCODONE AND ACETAMINOPHEN 2 TABLET(S): 5; 325 TABLET ORAL at 05:17

## 2019-08-16 RX ADMIN — Medication 500 MILLIGRAM(S): at 03:22

## 2019-08-16 RX ADMIN — HYDROMORPHONE HYDROCHLORIDE 1 MILLIGRAM(S): 2 INJECTION INTRAMUSCULAR; INTRAVENOUS; SUBCUTANEOUS at 01:24

## 2019-08-16 NOTE — ED ADULT NURSE REASSESSMENT NOTE - NS ED NURSE REASSESS COMMENT FT1
pt status unchanged, refer to flowsheet and chart, pt safety maintained, pt hemodynamically stable. Pt POC explained and verbalized understanding. Pt awaiting CT results.

## 2019-08-16 NOTE — ED ADULT NURSE REASSESSMENT NOTE - NS ED NURSE REASSESS COMMENT FT1
pt hemodynamically stable, PIV removed, refer to flowsheet and chart, pt to be discharged, pt understood discharge instructions and plan of care . Pt medically cleared by MD Cabral.

## 2019-08-22 ENCOUNTER — OUTPATIENT (OUTPATIENT)
Dept: OUTPATIENT SERVICES | Facility: HOSPITAL | Age: 49
LOS: 1 days | End: 2019-08-22
Payer: MEDICAID

## 2019-08-22 DIAGNOSIS — Z98.89 OTHER SPECIFIED POSTPROCEDURAL STATES: Chronic | ICD-10-CM

## 2019-08-22 DIAGNOSIS — Z98.51 TUBAL LIGATION STATUS: Chronic | ICD-10-CM

## 2019-08-22 DIAGNOSIS — M54.16 RADICULOPATHY, LUMBAR REGION: ICD-10-CM

## 2019-08-22 PROCEDURE — 62323 NJX INTERLAMINAR LMBR/SAC: CPT

## 2019-08-22 PROCEDURE — 77003 FLUOROGUIDE FOR SPINE INJECT: CPT

## 2019-09-19 ENCOUNTER — APPOINTMENT (OUTPATIENT)
Dept: NEUROSURGERY | Facility: CLINIC | Age: 49
End: 2019-09-19
Payer: MEDICAID

## 2019-09-19 VITALS
WEIGHT: 186 LBS | TEMPERATURE: 98.4 F | BODY MASS INDEX: 34.23 KG/M2 | HEART RATE: 72 BPM | DIASTOLIC BLOOD PRESSURE: 71 MMHG | HEIGHT: 62 IN | SYSTOLIC BLOOD PRESSURE: 105 MMHG

## 2019-09-19 DIAGNOSIS — Z78.9 OTHER SPECIFIED HEALTH STATUS: ICD-10-CM

## 2019-09-19 PROCEDURE — 99203 OFFICE O/P NEW LOW 30 MIN: CPT

## 2019-09-24 NOTE — REASON FOR VISIT
[Post Hospitalization] : a post hospitalization visit [FreeTextEntry1] : L5-S1 disk herniation, cervical radiculopathy

## 2019-09-24 NOTE — ASSESSMENT
[FreeTextEntry1] : Ms. Thomas presents with above history and imaging.  She reports findings consistent with cervical radiculopathy.  Patient will obtain an MRI cervical spine w/o contrast to further assess her UE paresthesias, weakness.  She should follow up after imaging for formal review.  She knows to call the office if there are any new or worsening symptoms

## 2019-09-24 NOTE — DATA REVIEWED
[de-identified] : At L5-S1, there is a disc bulge asymmetric to the left which narrows the  left lateral recess and contacts the descending left S1 nerve root. Mild  left neuroforaminal narrowing.

## 2019-09-24 NOTE — REVIEW OF SYSTEMS
[As Noted in HPI] : as noted in HPI [Numbness] : numbness [Tingling] : tingling [Difficulty Walking] : difficulty walking [Negative] : Heme/Lymph [FreeTextEntry9] : lower back

## 2019-09-24 NOTE — PHYSICAL EXAM
[General Appearance - In No Acute Distress] : in no acute distress [General Appearance - Alert] : alert [General Appearance - Well Nourished] : well nourished [Oriented To Time, Place, And Person] : oriented to person, place, and time [General Appearance - Well Developed] : well developed [Mood] : the mood was normal [Impaired Insight] : insight and judgment were intact [Affect] : the affect was normal [Person] : oriented to person [Time] : oriented to time [Place] : oriented to place [Concentration Intact] : normal concentrating ability [Fluency] : fluency intact [Comprehension] : comprehension intact [Cranial Nerves Optic (II)] : visual acuity intact bilaterally,  pupils equal round and reactive to light [Cranial Nerves Oculomotor (III)] : extraocular motion intact [Cranial Nerves Trigeminal (V)] : facial sensation intact symmetrically [Cranial Nerves Facial (VII)] : face symmetrical [Cranial Nerves Glossopharyngeal (IX)] : tongue and palate midline [Cranial Nerves Accessory (XI - Cranial And Spinal)] : head turning and shoulder shrug symmetric [Cranial Nerves Hypoglossal (XII)] : there was no tongue deviation with protrusion [Motor Tone] : muscle tone was normal in all four extremities [Sensation Tactile Decrease] : light touch was intact [Motor Strength] : muscle strength was normal in all four extremities [Sensation Pain / Temperature Decrease] : pain and temperature was intact [Balance] : balance was intact [Abnormal Walk] : normal gait [No Visual Abnormalities] : no visible abnormailities [Normal] : normal [Able to heel walk] : the patient was able to heel walk [Able to toe walk] : the patient was able to toe walk [Over the Past 2 Weeks, Have You Felt Down, Depressed, or Hopeless?] : 1.) Over the past 2 weeks, have you felt down, depressed, or hopeless? No [Over the Past 2 Weeks, Have You Felt Little Interest or Pleasure Doing Things?] : 2.) Over the past 2 weeks, have you felt little interest or pleasure doing things? No

## 2019-09-27 ENCOUNTER — APPOINTMENT (OUTPATIENT)
Dept: NEUROLOGY | Facility: CLINIC | Age: 49
End: 2019-09-27

## 2019-11-21 ENCOUNTER — APPOINTMENT (OUTPATIENT)
Dept: NEUROSURGERY | Facility: CLINIC | Age: 49
End: 2019-11-21
Payer: MEDICAID

## 2019-11-21 VITALS
HEIGHT: 62 IN | SYSTOLIC BLOOD PRESSURE: 108 MMHG | OXYGEN SATURATION: 98 % | TEMPERATURE: 98.2 F | HEART RATE: 63 BPM | BODY MASS INDEX: 34.6 KG/M2 | DIASTOLIC BLOOD PRESSURE: 70 MMHG | WEIGHT: 188 LBS

## 2019-11-21 PROCEDURE — 99213 OFFICE O/P EST LOW 20 MIN: CPT

## 2019-12-05 NOTE — PHYSICAL EXAM
[General Appearance - Alert] : alert [General Appearance - In No Acute Distress] : in no acute distress [General Appearance - Well Nourished] : well nourished [General Appearance - Well Developed] : well developed [Oriented To Time, Place, And Person] : oriented to person, place, and time [Impaired Insight] : insight and judgment were intact [Mood] : the mood was normal [Affect] : the affect was normal [Person] : oriented to person [Time] : oriented to time [Place] : oriented to place [Concentration Intact] : normal concentrating ability [Fluency] : fluency intact [Comprehension] : comprehension intact [Cranial Nerves Oculomotor (III)] : extraocular motion intact [Cranial Nerves Optic (II)] : visual acuity intact bilaterally,  pupils equal round and reactive to light [Cranial Nerves Trigeminal (V)] : facial sensation intact symmetrically [Cranial Nerves Glossopharyngeal (IX)] : tongue and palate midline [Cranial Nerves Facial (VII)] : face symmetrical [Cranial Nerves Accessory (XI - Cranial And Spinal)] : head turning and shoulder shrug symmetric [Cranial Nerves Hypoglossal (XII)] : there was no tongue deviation with protrusion [Motor Strength] : muscle strength was normal in all four extremities [Motor Tone] : muscle tone was normal in all four extremities [Sensation Tactile Decrease] : light touch was intact [Abnormal Walk] : normal gait [Sensation Pain / Temperature Decrease] : pain and temperature was intact [Balance] : balance was intact [No Visual Abnormalities] : no visible abnormailities [Normal] : normal [Able to toe walk] : the patient was able to toe walk [Able to heel walk] : the patient was able to heel walk [Over the Past 2 Weeks, Have You Felt Down, Depressed, or Hopeless?] : 1.) Over the past 2 weeks, have you felt down, depressed, or hopeless? No [Over the Past 2 Weeks, Have You Felt Little Interest or Pleasure Doing Things?] : 2.) Over the past 2 weeks, have you felt little interest or pleasure doing things? No [FreeTextEntry6] : UE and LE 5/5 bilaterally

## 2019-12-05 NOTE — ASSESSMENT
[FreeTextEntry1] : Ms. Thomas presents for follow-up with interval history and imaging findings as above.  The patient may continue conservative measures.  I would be happy to see her back on a prn basis.  She knows to call the office with any new or concerning symptoms at any time.

## 2019-12-05 NOTE — REVIEW OF SYSTEMS
[As Noted in HPI] : as noted in HPI [Tingling] : tingling [Numbness] : numbness [Negative] : Endocrine [Difficulty Walking] : no difficulty walking [Incontinence] : no incontinence [FreeTextEntry9] : neck and LBP

## 2020-01-24 NOTE — ED PROVIDER NOTE - PRINCIPAL DIAGNOSIS
Daily Note     Today's date: 2020  Patient name: Patrick Howell  : 1947  MRN: [id number]  Referring provider: Charmaine Blair DO  Dx:   Encounter Diagnosis     ICD-10-CM    1  Lumbosacral plexopathy G54 1                   Subjective: Pt states his cold is getting better  Objective: See treatment diary below      Assessment: Tolerated treatment well  Patient demonstrated fatigue post treatment, exhibited good technique with therapeutic exercises and would benefit from continued PT  Pt req rest periods b/t ex due to  Becoming  SOB during ex  Pt puts forth good effort during tx  Plan: Progress treatment as tolerated         Precautions: nonambulatory, BLE weakness R>L  Re-eval Date: 20    Date 1/6 1/10 1/17 1/20 1/24   Visit Count 1/10 2/10 3/10 4/10 5/10   FOTO RA       Pain In 0 0 0     Pain Out 0 0 0       Manual         Ham stretch        ITB stretch        Quad stretch        Hip Flexor stretch                  Date       UBE  60 RPM  10 min alt 60 RPM  10 min alt 50 PRM  10 min alt 50 rpm  10 min 50 rpm  10 min   Sit to stand trials from w/c        Evan  MTP/LTP        LTR        Heel slides B        SAQs        AH        AH with hip abd/add        Ankle DF        Bridges  GS 20x, 5" GS 20x, 5" w/ add @ home     Hip ABd                Tband Anti Trunk rotation          SLRs        Clamshells  Blue  3x10,5" Red  2x10 W/o TB  2x10 W/o TB   20 ea side W/o TB   20 ea side    parallel bars   35"x1  55"x1  45"x1 //  55" x1  45" x1  45" x1 //  58" x1  1 min x1  40" to pt fatigue  Supervision //  50" x 1  38" x 1  39" x 1  To pt fatigue  Sup of "x2  46"x1  38" x 1  To pt fatigue  Sup of 1                   Transfer w/c to mat supervision Supervision  Sup of 1 Sup of 1   90/90 hip abd SL         Quad hip extn   R 10x  L 2x5 10x B 2x10 B   Quad UE elevations  10x alt 10x 5" w/pball 15 x5"  w/pball 2x10  w/pball   Knee flexion Prone AAROM  2x10 ea    Iso hold 3" AAROM Prone AAROM  2x10 ea  Flex    Iso 5"   Knee ext prone  2x10 ea  Prone AAROM  2x10 ea  Flex    Iso 5"   Knee ext prone  2x10 ea Prone AAROM  2x10 ea  Flex    Iso 5"   Knee ext prone  2x10 ea   Hip extension        UE Elevations        Quad -> Tall kneel 3 trials  5-15" to pt tolerance 3 trials  15-45" to pt tolerance 3 trials  15-35" to pt tolerance 4 trials  15-30"  To renee 4 trials  15-30"  To to   HS curls Prone  1x10 B/L  AAROM with deep tendon tapping       [place] Stim trial        Knee extension Seated EOM  Deep tendon tapping   1x10 ea  AAROM L    Iso hold 5" R, 3" AAROM L    Resume upcoming       Dynamic seated balance Seated   Red disc, feet on foam  TB and DB UE exercises overhead    Reaching outside ELIS to target    Resume Upcoming Exacerbation of chronic back pain

## 2020-01-25 ENCOUNTER — EMERGENCY (EMERGENCY)
Facility: HOSPITAL | Age: 50
LOS: 1 days | Discharge: HOME | End: 2020-01-25
Attending: EMERGENCY MEDICINE
Payer: MEDICAID

## 2020-01-25 VITALS
TEMPERATURE: 102 F | OXYGEN SATURATION: 99 % | DIASTOLIC BLOOD PRESSURE: 85 MMHG | WEIGHT: 184.97 LBS | SYSTOLIC BLOOD PRESSURE: 124 MMHG | RESPIRATION RATE: 18 BRPM | HEART RATE: 98 BPM | HEIGHT: 65 IN

## 2020-01-25 DIAGNOSIS — Z98.89 OTHER SPECIFIED POSTPROCEDURAL STATES: Chronic | ICD-10-CM

## 2020-01-25 DIAGNOSIS — Z98.51 TUBAL LIGATION STATUS: Chronic | ICD-10-CM

## 2020-01-25 PROCEDURE — 99284 EMERGENCY DEPT VISIT MOD MDM: CPT | Mod: 25

## 2020-01-25 PROCEDURE — 99284 EMERGENCY DEPT VISIT MOD MDM: CPT

## 2020-01-25 PROCEDURE — 71046 X-RAY EXAM CHEST 2 VIEWS: CPT

## 2020-01-25 PROCEDURE — 94640 AIRWAY INHALATION TREATMENT: CPT

## 2020-01-25 PROCEDURE — 71046 X-RAY EXAM CHEST 2 VIEWS: CPT | Mod: 26

## 2020-01-25 RX ORDER — ACETAMINOPHEN 500 MG
975 TABLET ORAL ONCE
Refills: 0 | Status: COMPLETED | OUTPATIENT
Start: 2020-01-25 | End: 2020-01-25

## 2020-01-25 RX ORDER — AZITHROMYCIN 500 MG/1
1 TABLET, FILM COATED ORAL
Qty: 1 | Refills: 0
Start: 2020-01-25 | End: 2020-01-29

## 2020-01-25 RX ORDER — ALBUTEROL 90 UG/1
2 AEROSOL, METERED ORAL ONCE
Refills: 0 | Status: COMPLETED | OUTPATIENT
Start: 2020-01-25 | End: 2020-01-25

## 2020-01-25 RX ORDER — IBUPROFEN 200 MG
1 TABLET ORAL
Qty: 40 | Refills: 0
Start: 2020-01-25 | End: 2020-02-03

## 2020-01-25 RX ORDER — GUAIFENESIN/DEXTROMETHORPHAN 600MG-30MG
1 TABLET, EXTENDED RELEASE 12 HR ORAL
Qty: 14 | Refills: 0
Start: 2020-01-25 | End: 2020-01-31

## 2020-01-25 RX ORDER — CODEINE PHOSPHATE/GUAIFENESIN
10 SYRUP ORAL
Qty: 100 | Refills: 0
Start: 2020-01-25 | End: 2020-02-03

## 2020-01-25 RX ADMIN — ALBUTEROL 2 PUFF(S): 90 AEROSOL, METERED ORAL at 11:17

## 2020-01-25 RX ADMIN — Medication 100 MILLIGRAM(S): at 11:16

## 2020-01-25 RX ADMIN — Medication 975 MILLIGRAM(S): at 11:16

## 2020-01-25 NOTE — ED STATDOCS - NSFOLLOWUPINSTRUCTIONS_ED_ALL_ED_FT
1. TAKE ALL MEDICATIONS AS DIRECTED.  FOR PAIN YOU CAN TAKE IBUPROFEN (MOTRIN, ADVIL) OR ACETAMINOPHEN (TYLENOL) AS NEEDED, AS DIRECTED ON PACKAGING.  2. FOLLOW UP WITH __PMD________ AS DIRECTED.  YOU WERE GIVEN COPIES OF ALL LABS AND IMAGING RESULTS FROM YOUR ER VISIT--PLEASE TAKE THEM WITH YOU TO YOUR APPOINTMENT.  3. IF NEEDED, CALL [phone] TO FIND A PRIMARY CARE PHYSICIAN.  OR CALL [phone] TO MAKE AN APPOINTMENT WITH THE MEDICAL CLINIC.  4. RETURN TO THE ER FOR ANY WORSENING SYMPTOMS.    Viral Respiratory Infection    A viral respiratory infection is an illness that affects parts of the body used for breathing, like the lungs, nose, and throat. It is caused by a germ called a virus. Symptoms can include runny nose, coughing, sneezing, fatigue, body aches, sore throat, fever, or headache. Over the counter medicine can be used to manage the symptoms but the infection typically goes away on its own in 5 to 10 days.     SEEK IMMEDIATE MEDICAL CARE IF YOU HAVE ANY OF THE FOLLOWING SYMPTOMS: shortness of breath, chest pain, fever over 10 days, or lightheadedness/dizziness.    Fever    A fever is an increase in the body's temperature above 100.4°F (38°C) or higher. In adults and children older than three months, a brief mild or moderate fever generally has no long-term effect, and it usually does not require treatment. Many times, fevers are the result of viral infections, which are self-resolving.  However, certain symptoms or diagnostic tests may suggest a bacterial infection that may respond to antibiotics. Take medications as directed by your health care provider.    SEEK IMMEDIATE MEDICAL CARE IF YOU OR YOUR CHILD HAVE ANY OF THE FOLLOWING SYMPTOMS : shortness of breath, seizure, rash/stiff neck/headache, severe abdominal pain, persistent vomiting, any signs of dehydration, or if your child has a fever for over five (5) days.

## 2020-01-25 NOTE — ED STATDOCS - OBJECTIVE STATEMENT
48 y/o F pt with hx of migraines, anemia presents to ED c/o cough, nasal congestion, fever, generalized weakness myalgias, chills x 10 days. Cough was originally productive, but has not been the past few days. Denies ABD pain, CP, N/V/D or urinary symptoms. Denies recent travel. (-) sick contacts. No further complaints at this time.

## 2020-01-25 NOTE — ED STATDOCS - PROGRESS NOTE DETAILS
CXR negative- Pt with flu syndrome- supportive care instructed CXR with small right sided pleural effusion- Pt appears clinically like flu syndrome, however, given small pleural effusion with tx with Zpak and  supportive care instructed

## 2020-01-25 NOTE — ED ADULT TRIAGE NOTE - CHIEF COMPLAINT QUOTE
Pt states "I've had a cough for three days and then I thought I was getting a fever last night so I took tylenol but I didn't want to take it this morning because I wanted to call the ambulance to come here", c/o nonproductive cough, pt with mask in place

## 2020-01-25 NOTE — ED STATDOCS - NS ED ROS FT
Const: + fever, +chills, +myalgias  HEENT: Denies blurry vision, sore throat  Neck: Denies neck pain/stiffness  Resp: +coughing, denies SOB  Cardiovascular: Denies CP, palpitations, LE edema  GI: Denies nausea, vomiting, abdominal pain, diarrhea, constipation, blood in stool  : Denies urinary frequency/urgency/dysuria, hematuria  MSK: Denies back pain  Neuro: Denies HA, dizziness, numbness, weakness  Skin: Denies rashes.

## 2020-01-25 NOTE — ED STATDOCS - ATTENDING CONTRIBUTION TO CARE
I, Bob Mcdaniel, performed the initial face to face bedside interview with this patient regarding history of present illness, review of symptoms and relevant past medical, social and family history.  I completed an independent physical examination.  I was the initial provider who evaluated this patient. I have signed out the follow up of any pending tests (i.e. labs, radiological studies) to the ACP.  I have communicated the patient’s plan of care and disposition with the ACP.

## 2020-01-25 NOTE — ED STATDOCS - PATIENT PORTAL LINK FT
You can access the FollowMyHealth Patient Portal offered by [facility] by registering at the following website: [email or web address]. By joining [employer]’s FollowMyHealth portal, you will also be able to view your health information using other applications (apps) compatible with our system.

## 2020-02-18 ENCOUNTER — APPOINTMENT (OUTPATIENT)
Dept: NEUROLOGY | Facility: CLINIC | Age: 50
End: 2020-02-18
Payer: MEDICAID

## 2020-02-18 VITALS
WEIGHT: 182 LBS | DIASTOLIC BLOOD PRESSURE: 72 MMHG | HEIGHT: 62 IN | BODY MASS INDEX: 33.49 KG/M2 | SYSTOLIC BLOOD PRESSURE: 118 MMHG

## 2020-02-18 PROCEDURE — 99214 OFFICE O/P EST MOD 30 MIN: CPT

## 2020-02-18 NOTE — CONSULT LETTER
[Dear  ___] : Dear  [unfilled], [Consult Letter:] : I had the pleasure of evaluating your patient, [unfilled]. [Please see my note below.] : Please see my note below. [Consult Closing:] : Thank you very much for allowing me to participate in the care of this patient.  If you have any questions, please do not hesitate to contact me. [FreeTextEntry3] : Viraj Sanchez M.D., Ph.D. DPN-N\par [facility]\par Neurology at [place]\par Medical Director of Stroke Services\par [facility]\par  [Sincerely,] : Sincerely,

## 2020-02-18 NOTE — REVIEW OF SYSTEMS
[As Noted in HPI] : as noted in HPI [Migraine Headache] : migraine headaches [Negative] : Integumentary

## 2020-02-18 NOTE — PHYSICAL EXAM
[Person] : oriented to person [Place] : oriented to place [Remote Intact] : remote memory intact [Time] : oriented to time [Span Intact] : the attention span was normal [Concentration Intact] : normal concentrating ability [Repeating Phrases] : no difficulty repeating a phrase [Visual Intact] : visual attention was ~T not ~L decreased [Naming Objects] : no difficulty naming common objects [Writing A Sentence] : no difficulty writing a sentence [Fluency] : fluency intact [Reading] : reading intact [Current Events] : adequate knowledge of current events [Comprehension] : comprehension intact [Cranial Nerves Oculomotor (III)] : extraocular motion intact [Past History] : adequate knowledge of personal past history [Cranial Nerves Optic (II)] : visual acuity intact bilaterally,  visual fields full to confrontation, pupils equal round and reactive to light [Cranial Nerves Trigeminal (V)] : facial sensation intact symmetrically [Cranial Nerves Facial (VII)] : face symmetrical [Cranial Nerves Vestibulocochlear (VIII)] : hearing was intact bilaterally [Cranial Nerves Accessory (XI - Cranial And Spinal)] : head turning and shoulder shrug symmetric [Cranial Nerves Glossopharyngeal (IX)] : tongue and palate midline [Motor Strength] : muscle strength was normal in all four extremities [Cranial Nerves Hypoglossal (XII)] : there was no tongue deviation with protrusion [Motor Tone] : muscle tone was normal in all four extremities [Involuntary Movements] : no involuntary movements were seen [No Muscle Atrophy] : normal bulk in all four extremities [Paresis Pronator Drift Right-Sided] : no pronator drift on the right [Motor Strength Lower Extremities Bilaterally] : strength was normal in both lower extremities [Paresis Pronator Drift Left-Sided] : no pronator drift on the left [Motor Strength Upper Extremities Bilaterally] : strength was normal in both upper extremities [Balance] : balance was intact [Abnormal Walk] : normal gait [Sensation Tactile Decrease] : light touch was intact [Tremor] : no tremor present [Coordination - Dysmetria Impaired Finger-to-Nose Bilateral] : not present [2+] : Patella right 2+ [Extraocular Movements] : extraocular movements were intact [PERRL With Normal Accommodation] : pupils were equal in size, round, reactive to light, with normal accommodation [Sclera] : the sclera and conjunctiva were normal [No LISA] : no internuclear ophthalmoplegia [No APD] : no afferent pupillary defect [Optic Disc Abnormality] : the optic disc were normal in size and color [Papilledema Of Both Eyes] : no papilledema [Disc Blurred Margins Both Eyes] : sharp margins [Full Visual Field] : full visual field

## 2020-02-18 NOTE — HISTORY OF PRESENT ILLNESS
[FreeTextEntry1] : Initial office visit February 18, 2020:\par This is a 49-year-old woman who presents today for evaluation of migraine headache. She previously seen different neurologist who had prescribed Topamax. She is not sure of the dose. She has been getting the Topamax from her psychiatrist and they are only comfortable prescribing 25 mg twice a day. If this dose she is having 3 headaches per week. The headaches are accompanied with nausea vomiting photophobia and the need to stay in a dark quiet room. She had tried Imitrex for them but probably wasn't taking in as soon as the headache came on. She states that it got worse before it got better and then made her feel heavy. She is here today for neurologic evaluation and treatment of these frequent migraine headaches.

## 2020-02-18 NOTE — ASSESSMENT
[FreeTextEntry1] : this is a 49-year-old woman who presents today to find a new neurologist for migraines. I will start by increasing her Topamax up to 50 mg twice a day. I asked her call me in 3-4 weeks, if this is not helpful control migraines I will increase her 200 mg twice a day at that time. I will also give her a prescription for Imitrex to take for breakthrough migraine headache. I have advised her to take Imitrex. I will see her back in the office in 3 months, sooner should the need arise.

## 2020-05-18 ENCOUNTER — APPOINTMENT (OUTPATIENT)
Dept: NEUROLOGY | Facility: CLINIC | Age: 50
End: 2020-05-18
Payer: MEDICAID

## 2020-05-18 PROCEDURE — 99213 OFFICE O/P EST LOW 20 MIN: CPT | Mod: 95

## 2020-05-18 NOTE — PHYSICAL EXAM
[FreeTextEntry1] : Neurologic examination was limited due to the video call.\par \par Mental status: Awake and alert fully oriented to person place and time. There is no aphasia.\par \par Cranial nerves:  Full extraocular movements. There is no nystagmus. Normal facial sensation and motor function. Tongue was in the midline.\par \par Motor: no pronator drift  bilaterally.\par \par Sensation: Light touch was intact \par \par Coordination: Deferred\par \par Gait: deferred\par

## 2020-05-18 NOTE — CONSULT LETTER
[Dear  ___] : Dear  [unfilled], [Please see my note below.] : Please see my note below. [Courtesy Letter:] : I had the pleasure of seeing your patient, [unfilled], in my office today. [Consult Closing:] : Thank you very much for allowing me to participate in the care of this patient.  If you have any questions, please do not hesitate to contact me. [FreeTextEntry3] : Viraj Sanchez M.D., Ph.D. DPN-N\par [facility]\par Neurology at [place]\par Medical Director of Stroke Services\par [facility]\par  [Sincerely,] : Sincerely,

## 2020-05-18 NOTE — HISTORY OF PRESENT ILLNESS
[Home] : at home, [unfilled] , at the time of the visit. [Medical Office: ([facility])___] : at the medical office located in  [Patient] : the patient [FreeTextEntry1] : Initial office visit February 18, 2020:\par This is a 49-year-old woman who presents today for evaluation of migraine headache. She previously seen different neurologist who had prescribed Topamax. She is not sure of the dose. She has been getting the Topamax from her psychiatrist and they are only comfortable prescribing 25 mg twice a day. If this dose she is having 3 headaches per week. The headaches are accompanied with nausea vomiting photophobia and the need to stay in a dark quiet room. She had tried Imitrex for them but probably wasn't taking in as soon as the headache came on. She states that it got worse before it got better and then made her feel heavy. She is here today for neurologic evaluation and treatment of these frequent migraine headaches.\par \par Followup May 18, 2020:\par This is a 50-year-old woman who presents today for followup of migraine headache. This visit was done via video conferencing due to the corona virus outbreak. Verbal consent was obtained at the time of the visit. Since starting the Topamax her headache frequency has gone down. Typically she'll take Imitrex for breakthrough headaches which works. Since starting the Topamax she is dropped her headache frequency by over 50%. She is here today for neurologic followup evaluation via video.\par \par

## 2020-05-18 NOTE — ASSESSMENT
[FreeTextEntry1] : This is a 50-year-old woman with migraine headaches. She is responding well to Topamax 50 mg twice a day for prevention reducing her headaches at least 50% without causing intolerable side effects. She continues to take Imitrex 100 mg for headaches when she has it available. This does help stop her headaches. She'll continue this regimen and I will see her back in 6 months, sooner should the need arise.

## 2020-05-27 ENCOUNTER — APPOINTMENT (OUTPATIENT)
Dept: INTERNAL MEDICINE | Facility: CLINIC | Age: 50
End: 2020-05-27
Payer: MEDICAID

## 2020-05-27 ENCOUNTER — NON-APPOINTMENT (OUTPATIENT)
Age: 50
End: 2020-05-27

## 2020-05-27 VITALS — SYSTOLIC BLOOD PRESSURE: 120 MMHG | HEART RATE: 70 BPM | RESPIRATION RATE: 14 BRPM | DIASTOLIC BLOOD PRESSURE: 78 MMHG

## 2020-05-27 VITALS — WEIGHT: 187 LBS | BODY MASS INDEX: 34.41 KG/M2 | TEMPERATURE: 98.3 F | HEIGHT: 62 IN

## 2020-05-27 DIAGNOSIS — Z23 ENCOUNTER FOR IMMUNIZATION: ICD-10-CM

## 2020-05-27 DIAGNOSIS — Z11.59 ENCOUNTER FOR SCREENING FOR OTHER VIRAL DISEASES: ICD-10-CM

## 2020-05-27 DIAGNOSIS — Z86.2 PERSONAL HISTORY OF DISEASES OF THE BLOOD AND BLOOD-FORMING ORGANS AND CERTAIN DISORDERS INVOLVING THE IMMUNE MECHANISM: ICD-10-CM

## 2020-05-27 PROCEDURE — 36415 COLL VENOUS BLD VENIPUNCTURE: CPT

## 2020-05-27 PROCEDURE — 90715 TDAP VACCINE 7 YRS/> IM: CPT

## 2020-05-27 PROCEDURE — 99386 PREV VISIT NEW AGE 40-64: CPT | Mod: 25

## 2020-05-27 PROCEDURE — 90471 IMMUNIZATION ADMIN: CPT

## 2020-05-27 PROCEDURE — 99212 OFFICE O/P EST SF 10 MIN: CPT | Mod: 25

## 2020-05-27 PROCEDURE — 93000 ELECTROCARDIOGRAM COMPLETE: CPT

## 2020-05-27 RX ORDER — LOSARTAN POTASSIUM AND HYDROCHLOROTHIAZIDE 100; 12.5 MG/1; MG/1
TABLET, FILM COATED ORAL
Refills: 0 | Status: DISCONTINUED | COMMUNITY
End: 2020-05-27

## 2020-05-27 RX ORDER — FAMOTIDINE 10 MG/1
TABLET, FILM COATED ORAL
Refills: 0 | Status: DISCONTINUED | COMMUNITY
End: 2020-05-27

## 2020-05-27 NOTE — HEALTH RISK ASSESSMENT
[Excellent] : ~his/her~  mood as  excellent [No] : No [0] : 2) Feeling down, depressed, or hopeless: Not at all (0) [HIV test declined] : HIV test declined [Hepatitis C test declined] : Hepatitis C test declined [With Family] : lives with family [High School] : high school [Single] : single [Fully functional (bathing, dressing, toileting, transferring, walking, feeding)] : Fully functional (bathing, dressing, toileting, transferring, walking, feeding) [Fully functional (using the telephone, shopping, preparing meals, housekeeping, doing laundry, using] : Fully functional and needs no help or supervision to perform IADLs (using the telephone, shopping, preparing meals, housekeeping, doing laundry, using transportation, managing medications and managing finances) [Smoke Detector] : smoke detector [Seat Belt] :  uses seat belt [Safety elements used in home] : safety elements used in home [Patient declined bone density test] : Patient declined bone density test [Patient reported colonoscopy was normal] : Patient reported colonoscopy was normal [] : No [Behavior] : denies difficulty with behavior [Change in mental status noted] : No change in mental status noted [Language] : denies difficulty with language [Handling Complex Tasks] : denies difficulty handling complex tasks [Learning/Retaining New Information] : denies difficulty learning/retaining new information [Reasoning] : denies difficulty with reasoning [Reports changes in vision] : Reports no changes in vision [Reports changes in hearing] : Reports no changes in hearing [Reports normal functional visual acuity (ie: able to read med bottle)] : Reports poor functional visual acuity.  [Reports changes in dental health] : Reports no changes in dental health [Carbon Monoxide Detector] : no carbon monoxide detector [Guns at Home] : no guns at home [Travel to Developing Areas] : does not  travel to developing areas [Caregiver Concerns] : does not have caregiver concerns [Sunscreen] : does not use sunscreen [MammogramDate] : due [PapSmearDate] : due [ColonoscopyDate] : 2018 [AdvancecareDate] : 5/27/20

## 2020-05-27 NOTE — HISTORY OF PRESENT ILLNESS
[de-identified] : for cpe\par one week ago fell and fracture her right navicular bone of her hand\par has ortho appt tomorrow\par patient in a splint\par patient has no fever no sob no nvd or palpitaitons\par she states she passed out which she states has been related to her history of anemia\par had sleeve procedure in the past threee years\par  [FreeTextEntry1] : for cpe

## 2020-05-27 NOTE — ASSESSMENT
[FreeTextEntry1] : right wrist fracture, ortho appt tomorrow\par check labs\par ekg ok \par iron def anemia, if anemic will need to see hematology\par if not anemic need to have her see cardio to determine why she passed out and fracture her wrist\par tdap given\par obtian mammogram\par colonoscopy done 2018 at time of sleeve procedure\par gave 7 days oxycodone for wrist pain until evaluated by ortho tomorrow\par

## 2020-05-27 NOTE — PHYSICAL EXAM
[No Acute Distress] : no acute distress [Well Nourished] : well nourished [Well Developed] : well developed [Normal Sclera/Conjunctiva] : normal sclera/conjunctiva [Well-Appearing] : well-appearing [EOMI] : extraocular movements intact [Normal Outer Ear/Nose] : the outer ears and nose were normal in appearance [PERRL] : pupils equal round and reactive to light [Normal Oropharynx] : the oropharynx was normal [No JVD] : no jugular venous distention [Thyroid Normal, No Nodules] : the thyroid was normal and there were no nodules present [Supple] : supple [No Lymphadenopathy] : no lymphadenopathy [No Accessory Muscle Use] : no accessory muscle use [No Respiratory Distress] : no respiratory distress  [Clear to Auscultation] : lungs were clear to auscultation bilaterally [Normal Rate] : normal rate  [Regular Rhythm] : with a regular rhythm [Normal S1, S2] : normal S1 and S2 [No Carotid Bruits] : no carotid bruits [No Murmur] : no murmur heard [No Varicosities] : no varicosities [No Abdominal Bruit] : a ~M bruit was not heard ~T in the abdomen [Pedal Pulses Present] : the pedal pulses are present [No Edema] : there was no peripheral edema [No Palpable Aorta] : no palpable aorta [Soft] : abdomen soft [No Extremity Clubbing/Cyanosis] : no extremity clubbing/cyanosis [Non Tender] : non-tender [No HSM] : no HSM [Non-distended] : non-distended [No Masses] : no abdominal mass palpated [Normal Anterior Cervical Nodes] : no anterior cervical lymphadenopathy [Normal Posterior Cervical Nodes] : no posterior cervical lymphadenopathy [Normal Bowel Sounds] : normal bowel sounds [No CVA Tenderness] : no CVA  tenderness [No Spinal Tenderness] : no spinal tenderness [No Joint Swelling] : no joint swelling [Grossly Normal Strength/Tone] : grossly normal strength/tone [Coordination Grossly Intact] : coordination grossly intact [No Focal Deficits] : no focal deficits [No Rash] : no rash [Normal Affect] : the affect was normal [Deep Tendon Reflexes (DTR)] : deep tendon reflexes were 2+ and symmetric [Normal Gait] : normal gait [Normal Insight/Judgement] : insight and judgment were intact [de-identified] : right wrist splint

## 2020-05-28 ENCOUNTER — APPOINTMENT (OUTPATIENT)
Dept: ORTHOPEDIC SURGERY | Facility: CLINIC | Age: 50
End: 2020-05-28
Payer: MEDICAID

## 2020-05-28 VITALS
HEIGHT: 62 IN | BODY MASS INDEX: 34.6 KG/M2 | WEIGHT: 188 LBS | DIASTOLIC BLOOD PRESSURE: 72 MMHG | SYSTOLIC BLOOD PRESSURE: 110 MMHG

## 2020-05-28 DIAGNOSIS — Z78.9 OTHER SPECIFIED HEALTH STATUS: ICD-10-CM

## 2020-05-28 LAB
25(OH)D3 SERPL-MCNC: 27.7 NG/ML
ALBUMIN SERPL ELPH-MCNC: 4.2 G/DL
ALP BLD-CCNC: 133 U/L
ALT SERPL-CCNC: 24 U/L
ANION GAP SERPL CALC-SCNC: 14 MMOL/L
APPEARANCE: CLEAR
AST SERPL-CCNC: 22 U/L
BASOPHILS # BLD AUTO: 0.08 K/UL
BASOPHILS NFR BLD AUTO: 1.2 %
BILIRUB SERPL-MCNC: 0.2 MG/DL
BILIRUBIN URINE: NEGATIVE
BLOOD URINE: NEGATIVE
BUN SERPL-MCNC: 9 MG/DL
CALCIUM SERPL-MCNC: 10.1 MG/DL
CHLORIDE SERPL-SCNC: 104 MMOL/L
CHOLEST SERPL-MCNC: 196 MG/DL
CHOLEST/HDLC SERPL: 3.1 RATIO
CO2 SERPL-SCNC: 22 MMOL/L
COLOR: YELLOW
CREAT SERPL-MCNC: 0.86 MG/DL
CREAT SPEC-SCNC: 201 MG/DL
EOSINOPHIL # BLD AUTO: 0.11 K/UL
EOSINOPHIL NFR BLD AUTO: 1.7 %
ESTIMATED AVERAGE GLUCOSE: 111 MG/DL
FOLATE SERPL-MCNC: 11.9 NG/ML
GLUCOSE QUALITATIVE U: NEGATIVE
GLUCOSE SERPL-MCNC: 88 MG/DL
HBA1C MFR BLD HPLC: 5.5 %
HCT VFR BLD CALC: 42.2 %
HDLC SERPL-MCNC: 64 MG/DL
HGB BLD-MCNC: 12.7 G/DL
IMM GRANULOCYTES NFR BLD AUTO: 0.2 %
IRON SATN MFR SERPL: 14 %
IRON SERPL-MCNC: 49 UG/DL
KETONES URINE: NEGATIVE
LDLC SERPL CALC-MCNC: 113 MG/DL
LEUKOCYTE ESTERASE URINE: NEGATIVE
LYMPHOCYTES # BLD AUTO: 3.1 K/UL
LYMPHOCYTES NFR BLD AUTO: 47.5 %
MAN DIFF?: NORMAL
MCHC RBC-ENTMCNC: 24.8 PG
MCHC RBC-ENTMCNC: 30.1 GM/DL
MCV RBC AUTO: 82.4 FL
MICROALBUMIN 24H UR DL<=1MG/L-MCNC: <1.2 MG/DL
MICROALBUMIN/CREAT 24H UR-RTO: NORMAL MG/G
MONOCYTES # BLD AUTO: 0.39 K/UL
MONOCYTES NFR BLD AUTO: 6 %
NEUTROPHILS # BLD AUTO: 2.83 K/UL
NEUTROPHILS NFR BLD AUTO: 43.4 %
NITRITE URINE: NEGATIVE
PH URINE: 6.5
PLATELET # BLD AUTO: 480 K/UL
POTASSIUM SERPL-SCNC: 4.3 MMOL/L
PROT SERPL-MCNC: 7.2 G/DL
PROTEIN URINE: NORMAL
RBC # BLD: 5.12 M/UL
RBC # FLD: 16.4 %
SARS-COV-2 IGG SERPL IA-ACNC: 0.11 INDEX
SARS-COV-2 IGG SERPL QL IA: NEGATIVE
SODIUM SERPL-SCNC: 139 MMOL/L
SPECIFIC GRAVITY URINE: 1.02
T4 FREE SERPL-MCNC: 0.8 NG/DL
TIBC SERPL-MCNC: 355 UG/DL
TRIGL SERPL-MCNC: 96 MG/DL
TSH SERPL-ACNC: 2 UIU/ML
UIBC SERPL-MCNC: 306 UG/DL
UROBILINOGEN URINE: NORMAL
VIT B12 SERPL-MCNC: 453 PG/ML
WBC # FLD AUTO: 6.52 K/UL

## 2020-05-28 PROCEDURE — 99204 OFFICE O/P NEW MOD 45 MIN: CPT | Mod: 25

## 2020-05-28 PROCEDURE — 29085 APPL CAST HAND&LWR FOREARM: CPT | Mod: RT

## 2020-05-28 PROCEDURE — 73110 X-RAY EXAM OF WRIST: CPT | Mod: RT

## 2020-05-28 NOTE — PHYSICAL EXAM
[de-identified] : GENERAL: Awake, alert, cooperative, answers questions appropriately. No acute distress.  Ambulates independently with a normal gait.\par SKIN: Warm, dry, intact. Color and turgor normal. \par LUNGS: Demonstrates unlabored breathing on room air with no accessory muscle use.\par EXTREMITIES: Warm and well-perfused. \par NEUROLOGICAL: Grossly intact.\par \par FOCUSED UPPER EXTREMITY EXAM:\par \par RUE thumb spica splint removed in the office today:\par -skin intact without any erythema; evolving ecchymosis along volar radial and volar wrist; moderate swelling along base of the thumb and volar wrist; normal finger cascade without malrotation\par -no thenar atrophy; no intrinsics wasting; 5/5 strength thumb opposition; 5/5 strength intrinsics\par -no tenderness to palpation over the A1 pulleys\par -no tenderness to palpation along first dorsal wrist compartment\par -no tenderness to palpation at the base of the thumb\par -moderate tenderness to palpation at the anatomic snuffbox and scaphoid tubercle\par -moderate tenderness to palpation over radial styloid\par -moderate pain with passive thumb circumduction with negative grind test\par -moderate pain with resisted thumb extension with positive Finkelstein test\par -moderate tenderness to palpation in the SL interval; unable to tolerate scaphoid shift test\par -no tenderness at the fovea\par -unable to make full fist, slightly decreased AROM in all fingers due to pain and swelling, no scissoring\par -decreased wrist ROM and forearm supination/pronation; no ECU subluxation; DRUJ stable and nontender\par -negative carpal tunnel compression test, negative Phalen's, negative Tinel's at the wrist\par -no ulnar nerve subluxation at elbow with gentle PROM; negative cubital tunnel compression test; negative Tinel's at elbow\par -sensation intact in radial, ulnar, and median nerve distributions\par -Brisk capillary refill, fingers warm well perfused \par  [de-identified] : X-rays of right wrist (3 views) in the cast were obtained in Office today and reviewed with patient, showing no change in carpal alignment from the injury films\par \par Outside XRs of right wrist (3 views) from 5/22/2020 were imported and reviewed with patient, showing avulsion fracture at scaphoid tubercle, possible nondisplaced radial styloid fracture

## 2020-05-28 NOTE — HISTORY OF PRESENT ILLNESS
[Right] : right hand dominant [FreeTextEntry1] : 05/28/2020\par Ms. PHOENIX HOOD is a pleasant 50 year old female, RHD, .\par \par She presents to the office for evaluation of right wrist pain and fracture S/P fall on 5/20/2020 after LOC.  She was seen at an urgent care center where imaging showed possible fracture in the scaphoid.  She was placed in a thumb spica splint and referred to our office for follow up.  She reports episodes of LOC and is being worked up by her PCP and neurologist.\par \par She denies prior injury, but does endorse history of CTS many years ago treated conservatively.\par Currently her pain is constant, dull sometimes shooting, with radiation up the forearm.\par She denies paresthesia.\par She endorses night symptoms that wake her up.\par Symptoms improve with rest and ice, also prescribed pain medication.\par Symptoms worsen with activity.\par \par She is not diabetic.\par She denies history of thyroid disease.\par She denies current tobacco use.\par She denies recreational drug use\par She does not take any narcotic pain medication other than what was prescribed for the current injury.\par She takes oxycodone prescribed by her PCP, Dr. Yony Conner.\par

## 2020-05-28 NOTE — DISCUSSION/SUMMARY
[FreeTextEntry1] : 50F with right wrist pain and scaphoid tubercle avulsion fracture and possible nondisplaced radial styloid fracture (DOI 5/20/2020), and anatomic snuffbox tenderness concerning for occult scaphoid waist fracture\par \par -We discussed in detail the clinical and imaging findings\par -We discussed the pathophysiology and natural history of patient's condition\par -I would like to obtain an MRI to further evaluate for occult scaphoid waist fracture and I will call her with the results.\par -I placed her in a thumb spica cast and she is NWB in the RUE\par -She was advised to keep the RUE elevated and use ice intermittently to help decrease swelling \par -She was advised to take over the counter medication for pain as needed if there is no contraindication. \par -We discussed the importance of osteoporosis prevention with regular exercises and calcium and vitamin D supplementation\par -I will see her in 4 weeks for reevaluation and new XRs of the right wrist (3 views plus scaphoid view) out of the cast, sooner as needed\par -Patient had the opportunity to ask questions and she was in agreement with the plan\par -Over 50% of the time spent with the patient was on counseling the patient on the above diagnosis, treatment plan and prognosis.

## 2020-05-28 NOTE — REVIEW OF SYSTEMS
[Joint Pain] : joint pain [Joint Stiffness] : joint stiffness [Joint Swelling] : joint swelling [Sleep Disturbances] : ~T sleep disturbances [Negative] : Allergic/Immunologic

## 2020-05-28 NOTE — PROCEDURE
[FreeTextEntry1] : Cast Application\par \par After full discussion of treatment alternatives, and associated risks, complications, limitations, and expectations, and with the patient's verbal consent, patient was placed in a short arm Fiberglass thumb spica cast for the right hand and wrist. Her fingers were left fully free for range of motion. Pressure points were carefully padded.  Patient tolerated the procedure well and was neurovascularly intact afterwards.  Cast instructions explained.  All questions answered.  She expressed acceptance and understanding.

## 2020-05-29 LAB
HGB A MFR BLD: 97.6 %
HGB A2 MFR BLD: 2.4 %
HGB FRACT BLD-IMP: NORMAL

## 2020-06-02 ENCOUNTER — APPOINTMENT (OUTPATIENT)
Dept: RHEUMATOLOGY | Facility: CLINIC | Age: 50
End: 2020-06-02
Payer: MEDICAID

## 2020-06-02 PROCEDURE — 99203 OFFICE O/P NEW LOW 30 MIN: CPT | Mod: 95

## 2020-06-02 RX ORDER — QUETIAPINE 300 MG/1
300 TABLET, FILM COATED, EXTENDED RELEASE ORAL
Refills: 0 | Status: DISCONTINUED | COMMUNITY
End: 2020-06-02

## 2020-06-02 NOTE — HISTORY OF PRESENT ILLNESS
[Medical Office: ([facility])___] : at the medical office located in  [Home] : at home, [unfilled] , at the time of the visit. [Verbal consent obtained from patient] : the patient, [unfilled] [FreeTextEntry1] : Verbal consent given on 06/02/2020 at 14:19 by PHOENIX HOOD, []. \par AMwell video \par \par 6/2/20 Initial HPI\par 49 yo AAF w/ hx Migraines followiing w/ Dr. Sanchez.. started on Topamax... and dx x many ys FM.. Current symptoms:  \par Diffuse arthralgias/ myalgias "everywhere" w/ no overt joint swelling. Pain varies.. fairly controlled, pain worse as day progresses\par Migraines as noted improved lately w/ topamax \par BRENDAN works w/ psychologist on trazadone, duloxetine 40 mg daily and 75 mg wellbutrin... and psychotherapy..\par Sleep:  requires trazodone w/ + response, can't sleep without\par NOTE:\par - Seroquel 300 mg in past- off past yr stopped for logistical reasons... \par \par \par 2) Recent fall:  does have hx vertigo.. but can't remember why fell.  \par R wrist fracture... w/ recent fall- ? etiology of fall unknown, w/u in progress\par \par 3) Anemia:  s/p ADRIA 2015 w/ improvement since then..\par \par 4) Obesity:  s/p gastric sleeve 6/2018 total 75 lb ... now 187.. stable in past 3 m\par \par Labs 5/28/20 low T4 0.4 w/ nl TSH 2, Vit D 27 nl CBC w/ plt 480 , CMP, folate/ B12, UA, Hba1c 5.5 Covid Ab neg

## 2020-06-02 NOTE — DATA REVIEWED
[FreeTextEntry1] : Labs \par 5/28/20 low T4 0.4 w/ nl TSH 2, Vit D 27 nl CBC w/ plt 480 , CMP, folate/ B12, UA, Hba1c 5.5 Covid Ab neg

## 2020-06-02 NOTE — REVIEW OF SYSTEMS
[Constipation] : constipation [Heartburn] : heartburn [Arthralgias] : arthralgias [Joint Pain] : joint pain [As Noted in HPI] : as noted in HPI [Sleep Disturbances] : sleep disturbances [Anxiety] : anxiety [Negative] : Heme/Lymph [FreeTextEntry2] : overall stable, mild persistent fatigue  [FreeTextEntry7] : mild persistent  [FreeTextEntry9] : diffuse - R wrist fracture in cast

## 2020-06-02 NOTE — ASSESSMENT
[FreeTextEntry1] : 51 yo AAF w/ hx Migraines, vertigo, and long standing dx FM, lost previous long standing provider... \par \par 1) FM:  x many ys, fairly well controlled 4-5/10 at times, function without limitations.  Diffuse arthralgias/ myalgias "everywhere" w/ no overt joint swelling. Pain varies.. fairly controlled, pain worse as day progresses\par - Sleeping now well w/ trazodone per psych, but without this trouble falling/ staying and NRS \par -  MIgraines:   following w/ Dr. Sanchez.. started on Topamax... with + response \par - Mood:  BRENDAN works w/ psychologist/ psychiatrist on trazadone, duloxetine 40 mg daily and 75 mg wellbutrin... and psychotherapy.. w/ + response.. overall better \par - chronic neck pain:  cervicalgia/ radiculopathy, not active today\par - chronic LBP:  lumbar radiculopathy, not active today\par NOTE:\par Labs 5/28/20 low T4 0.4 w/ nl TSH 2, Vit D 27 nl CBC w/ plt 480 , CMP, folate/ B12, UA, Hba1c 5.5 Covid Ab neg\par - Seroquel 300 mg in past- off past yr stopped for logistical reasons... \par \par 2) Recent fall:  does have hx vertigo.. but can't remember why fell.  \par R wrist fracture... w/ recent fall- ? etiology of fall unknown, w/u in progress\par \par 3) Anemia:  s/p ADRIA 2015 w/ improvement since then..\par \par 4) Obesity:  s/p gastric sleeve 6/2018 total 75 lb ... now 187.. stable in past 3 m\par \par 5) Hypothyroidism:  nl TSH but T 4 low... will need to follow \par \par Plan:\par - no change in tx needed at this time... \par - needs exam for further assessment, will add additional studies to r/o systemic process and r/o hypothyroidism.\par - ov in 6 wk, labs before.. \par \par

## 2020-06-09 ENCOUNTER — RX CHANGE (OUTPATIENT)
Age: 50
End: 2020-06-09

## 2020-06-09 RX ORDER — TOPIRAMATE 50 MG/1
50 TABLET, FILM COATED ORAL
Qty: 60 | Refills: 5 | Status: DISCONTINUED | COMMUNITY
Start: 2020-06-09 | End: 2020-06-09

## 2020-06-11 ENCOUNTER — APPOINTMENT (OUTPATIENT)
Dept: ORTHOPEDIC SURGERY | Facility: CLINIC | Age: 50
End: 2020-06-11
Payer: MEDICAID

## 2020-06-11 PROCEDURE — 99441: CPT

## 2020-06-11 NOTE — REASON FOR VISIT
[Medical Office: ([facility])___] : at the medical office located in  [Home] : at home, [unfilled] , at the time of the visit. [Verbal consent obtained from patient] : the patient, [unfilled]

## 2020-06-11 NOTE — HISTORY OF PRESENT ILLNESS
[FreeTextEntry1] : 06/11/2020\par I spoke to Ms. PHOENIX HOOD regarding her MRI results of the right wrist from 6/6/2020, showing no acute fracture but sprain vs partial tear of SL  with volar soft tissue contusion, and old TFC tear.  We discussed that she could be treated with cast immobilization for the moment and take OTC pain medication as needed.  I will see her as scheduled for follow up.  Patient had the opportunity to ask questions and was in agreement with the plan.\par

## 2020-06-17 ENCOUNTER — APPOINTMENT (OUTPATIENT)
Dept: CARDIOLOGY | Facility: CLINIC | Age: 50
End: 2020-06-17

## 2020-06-25 ENCOUNTER — APPOINTMENT (OUTPATIENT)
Dept: ORTHOPEDIC SURGERY | Facility: CLINIC | Age: 50
End: 2020-06-25
Payer: MEDICAID

## 2020-06-25 VITALS
BODY MASS INDEX: 34.6 KG/M2 | HEIGHT: 62 IN | DIASTOLIC BLOOD PRESSURE: 69 MMHG | HEART RATE: 80 BPM | SYSTOLIC BLOOD PRESSURE: 100 MMHG | WEIGHT: 188 LBS

## 2020-06-25 DIAGNOSIS — M24.131 OTHER ARTICULAR CARTILAGE DISORDERS, RIGHT WRIST: ICD-10-CM

## 2020-06-25 DIAGNOSIS — M25.531 PAIN IN RIGHT WRIST: ICD-10-CM

## 2020-06-25 PROCEDURE — 99213 OFFICE O/P EST LOW 20 MIN: CPT

## 2020-07-14 ENCOUNTER — APPOINTMENT (OUTPATIENT)
Dept: RHEUMATOLOGY | Facility: CLINIC | Age: 50
End: 2020-07-14
Payer: MEDICAID

## 2020-07-14 VITALS
WEIGHT: 187 LBS | BODY MASS INDEX: 34.41 KG/M2 | SYSTOLIC BLOOD PRESSURE: 110 MMHG | DIASTOLIC BLOOD PRESSURE: 80 MMHG | TEMPERATURE: 97.5 F | OXYGEN SATURATION: 98 % | HEIGHT: 62 IN | HEART RATE: 79 BPM

## 2020-07-14 DIAGNOSIS — S62.101A FRACTURE OF UNSPECIFIED CARPAL BONE, RIGHT WRIST, INITIAL ENCOUNTER FOR CLOSED FRACTURE: ICD-10-CM

## 2020-07-14 PROCEDURE — 99215 OFFICE O/P EST HI 40 MIN: CPT

## 2020-07-14 RX ORDER — OXYCODONE HYDROCHLORIDE 5 MG/1
5 CAPSULE ORAL
Qty: 28 | Refills: 0 | Status: DISCONTINUED | COMMUNITY
Start: 2020-05-27 | End: 2020-07-14

## 2020-07-18 PROBLEM — S62.101A RIGHT WRIST FRACTURE: Status: ACTIVE | Noted: 2020-05-27

## 2020-07-18 RX ORDER — TRAZODONE HYDROCHLORIDE 100 MG/1
100 TABLET ORAL
Qty: 90 | Refills: 0 | Status: ACTIVE | COMMUNITY

## 2020-07-18 NOTE — HISTORY OF PRESENT ILLNESS
[FreeTextEntry1] : 7/14/20 \par - Overall arthralgia/ myalgias persists, frustrated, was better with gabapentin... \par - still frustrated by 2+ h sleep latency was better wwith gabapentin and zolpidem.  Poor tolerance for xanax.   \par - Working w/ psych adjusting medications, recently added bupropion 75 mg but can't appreciate any change yet... \par - Oxycodone only in past r/t wrist fracture \par - tolerating current regimen fairly well\par - Mood:  anxiety/ depression both fairly well controlled\par - Dizziness x many ys, w/u neg- dx w/ vertigo..  vag bleeding in past s/p ADRIA..\par \par 1) FM\par 2) Dizziness\par 3) R wrist fracture 5/20 s/p fall 2/2 dizziness  \par \par 6/2/20 Initial HPI\par 51 yo AAF w/ hx Migraines followiing w/ Dr. Sanchez.. started on Topamax... and dx x many ys FM.. Current symptoms:  \par Diffuse arthralgias/ myalgias "everywhere" w/ no overt joint swelling. Pain varies.. fairly controlled, pain worse as day progresses\par Migraines as noted improved lately w/ topamax \par BRENDAN works w/ psychologist on trazodone, duloxetine 40 mg daily and 75 mg wellbutrin... and psychotherapy..\par Sleep:  requires trazodone w/ + response, can't sleep without. Trouble falling/ staying asleep even with 200 mg trazodone and 50 mg topiramate... \par No formal exercise "was told not to... and got in trouble when tried" \par NOTE:\par - Seroquel 300 mg in past- off past yr stopped for logistical reasons... \par - gabapentin 600 mg TID w/ + response, stopped when provider left\par - Xanax poor response.. too dizzy and fell "into a bathtub"\par - Ambien excellent respones helped with sleep \par \par \par 2) Recent fall:  does have hx vertigo.. but can't remember why fell.  \par R wrist fracture... w/ recent fall- ? etiology of fall unknown, w/u in progress\par Dizziness persists.. can't afford balance tx... not NEW \par \par 3) Anemia:  s/p ADRIA 2015 w/ improvement since then..\par \par 4) Obesity:  s/p gastric sleeve 6/2018 total 75 lb ... now 187.. stable in past 3 m\par \par Labs 5/28/20 low T4 0.4 w/ nl TSH 2, Vit D 27 nl CBC w/ plt 480 , CMP, folate/ B12, UA, Hba1c 5.5 Covid Ab neg

## 2020-07-18 NOTE — ASSESSMENT
[FreeTextEntry1] : 51 yo AAF w/ hx Migraines, vertigo, and long standing dx FM, lost previous long standing provider... \par - Overall arthralgia/ myalgias persists, frustrated, was better with gabapentin...\par \par 1) FM (likely secondary to BRENDAN x many ys : . started on Topamax now at 50 mg qhs along w/ trazodone 100 mg and duloxetine and recent addition bupropion. moderate control, was better with gabapentin 600 mg TID but provider left practice and rx wasn't refilled, similar issue w/ Zolpidem- now struggles with sleep latency as well.. ... Tolerated both w/out issues in past.. would like to resume: \par Current symptoms:  \par - Diffuse arthralgias/ myalgias "everywhere" w/ no overt joint swelling. Pain varies.. fairly controlled, pain worse as day progresses. Should improve w/ gabapentin ("hurts to be touched")\par - Migraines as noted improved lately w/ topamax \par - BRENDAN works w/ psychologist on trazodone, duloxetine 40 mg daily and 75 mg wellbutrin... and psychotherapy..\par - Sleep:  requires trazodone w/ + response, can't sleep without. Trouble falling/ staying asleep even with 100 mg trazodone and 50 mg topiramate...previous use ambien ++ response (reportedly).. will reRx  \par No formal exercise "was told not to... and got in trouble when tried" \par NOTE:\par Social hx fairly unremarkable with no reported trauma or FH, motivated to use np strategies\par - Seroquel 300 mg in past- off past yr stopped for logistical reasons... \par - gabapentin 600 mg TID w/ + response, stopped when provider left\par - Xanax poor response.. too dizzy and fell "into a bathtub"\par - Ambien excellent response helped with sleep\par Labs 5/28/20 low T4 0.4 w/ nl TSH 2, Vit D 27 nl CBC w/ plt 480 , CMP, folate/ B12, UA, Hba1c 5.5 Covid Ab neg \par \par 2) Recent fall:  does have hx vertigo.. but can't remember why fell.  \par R wrist fracture... w/ recent fall- ? etiology of fall unknown, w/u in progress- nothing specific, healing well  \par \par 3) Dizziness:  updated eval for anemia negative, many ys, dx w/ Vertigo was doing dedicated PT but stopped w/ pandemic.  No formal exercise.  \par NOTE: \par  Anemia:  s/p ADRIA 2015 w/ improvement since then..\par \par 4) Obesity:  s/p gastric sleeve 6/2018 total 75 lb ... now 187.. stable in past 3 m\par \par 5) Hypothyroidism:  nl TSH but T 4 low... will need to follow \par \par Plan:\par - restart ambien 10 mg qhs now.. \par - in 2 wks restart gabapentin but try to use lower dose 300 mg TID or less ideally... \par - needs start exercise regimen.. start by tracking current exercise.. then progress. If unable to do so will need PT dedicated to progressive \par - labs before next visit .\par - ov 2 m with\par \par

## 2020-07-18 NOTE — PHYSICAL EXAM
[General Appearance - Alert] : alert [General Appearance - Well Developed] : well developed [General Appearance - Well Nourished] : well nourished [General Appearance - In No Acute Distress] : in no acute distress [Sclera] : the sclera and conjunctiva were normal [General Appearance - Well-Appearing] : healthy appearing [Outer Ear] : the ears and nose were normal in appearance [PERRL With Normal Accommodation] : pupils were equal in size, round, and reactive to light [Extraocular Movements] : extraocular movements were intact [Neck Appearance] : the appearance of the neck was normal [Oropharynx] : the oropharynx was normal [Neck Cervical Mass (___cm)] : no neck mass was observed [Thyroid Nodule] : there were no palpable thyroid nodules [Jugular Venous Distention Increased] : there was no jugular-venous distention [Thyroid Diffuse Enlargement] : the thyroid was not enlarged [Auscultation Breath Sounds / Voice Sounds] : lungs were clear to auscultation bilaterally [Heart Rate And Rhythm] : heart rate was normal and rhythm regular [Heart Sounds Gallop] : no gallops [Heart Sounds] : normal S1 and S2 [Murmurs] : no murmurs [Bowel Sounds] : normal bowel sounds [Heart Sounds Pericardial Friction Rub] : no pericardial rub [Edema] : there was no peripheral edema [Abdomen Soft] : soft [Abdomen Tenderness] : non-tender [Abdomen Mass (___ Cm)] : no abdominal mass palpated [Cervical Lymph Nodes Enlarged Posterior Bilaterally] : posterior cervical [Cervical Lymph Nodes Enlarged Anterior Bilaterally] : anterior cervical [Supraclavicular Lymph Nodes Enlarged Bilaterally] : supraclavicular [No CVA Tenderness] : no ~M costovertebral angle tenderness [Nail Clubbing] : no clubbing  or cyanosis of the fingernails [Abnormal Walk] : normal gait [No Spinal Tenderness] : no spinal tenderness [Motor Tone] : muscle strength and tone were normal [Musculoskeletal - Swelling] : no joint swelling seen [Skin Turgor] : normal skin turgor [Skin Color & Pigmentation] : normal skin color and pigmentation [] : no rash [Oriented To Time, Place, And Person] : oriented to person, place, and time [Sensation] : the sensory exam was normal to light touch and pinprick [No Focal Deficits] : no focal deficits [Impaired Insight] : insight and judgment were intact [Affect] : the affect was normal [FreeTextEntry1] : PSD initial 34 - PHQ 11 moderate/ BRENDAN 12 moderate-

## 2020-07-18 NOTE — DATA REVIEWED
[FreeTextEntry1] : Labs \par 5/28/20 low T4 0.4 w/ nl TSH 2, Vit D 27 nl CBC w/ plt 480 , CMP, folate/ B12, UA, Hba1c 5.5 Covid Ab neg\par \par Imaging/ Diagnostics \par

## 2020-10-01 ENCOUNTER — APPOINTMENT (OUTPATIENT)
Dept: RHEUMATOLOGY | Facility: CLINIC | Age: 50
End: 2020-10-01
Payer: MEDICAID

## 2020-10-01 PROCEDURE — 99442: CPT

## 2020-10-01 NOTE — ASSESSMENT
[FreeTextEntry1] : 51 yo AAF w/ hx Migraines, vertigo, and long standing dx FM, lost previous long standing provider... \par - Overall arthralgia/ myalgias persists, frustrated, was better with gabapentin...\par \par 1) FM (likely secondary to BRENDAN x many ys : . started on Topamax now at 50 mg qhs along w/ trazodone 100 mg and duloxetine and recent addition bupropion. moderate control, was better with gabapentin 600 mg TID but provider left practice and rx wasn't refilled, similar issue w/ Zolpidem- now struggles with sleep latency as well.. ... Tolerated both w/out issues in past.. would like to resume: \par Current symptoms:  \par - Diffuse arthralgias/ myalgias "everywhere" w/ no overt joint swelling. Pain varies.. fairly controlled, pain worse as day progresses. Should improve w/ gabapentin ("hurts to be touched")\par - Migraines as noted improved lately w/ topamax \par - BRENDAN works w/ psychologist on trazodone, duloxetine 40 mg daily and 75 mg wellbutrin... and psychotherapy..\par - Sleep:  requires trazodone w/ + response, can't sleep without. Trouble falling/ staying asleep even with 100 mg trazodone and 50 mg topiramate...previous use ambien ++ response (reportedly).. will reRx  \par No formal exercise "was told not to... and got in trouble when tried" \par NOTE:\par Social hx fairly unremarkable with no reported trauma or FH, motivated to use np strategies\par - Seroquel 300 mg in past- off past yr stopped for logistical reasons... \par - gabapentin 600 mg TID w/ + response, stopped when provider left\par - Xanax poor response.. too dizzy and fell "into a bathtub"\par - Ambien excellent response helped with sleep\par Labs 5/28/20 low T4 0.4 w/ nl TSH 2, Vit D 27 nl CBC w/ plt 480 , CMP, folate/ B12, UA, Hba1c 5.5 Covid Ab neg \par \par 2) Recent fall:  does have hx vertigo.. but can't remember why fell.  \par R wrist fracture... w/ recent fall- ? etiology of fall unknown, w/u in progress- nothing specific, healing well  \par \par 3) Dizziness:  updated eval for anemia negative, many ys, dx w/ Vertigo was doing dedicated PT but stopped w/ pandemic.  No formal exercise.  \par NOTE: \par  Anemia:  s/p ADRIA 2015 w/ improvement since then..\par \par 4) Obesity:  s/p gastric sleeve 6/2018 total 75 lb ... now 187.. stable in past 3 m\par \par 5) Hypothyroidism:  nl TSH but T 4 low... will need to follow \par \par 6) Lumbar radiculopathy w/ bowel incontinence:  reportedly noted in past + response to MIKE after neurosx did not feel surgical candidate.. noted return of issue 6/20 and didn't tell anyone. Not associated w/ severe pain, or weakness otherwise.  \par Last MRI LS more than 18 m ago.. (not available for review by myself)\par \par Plan:\par - continue  ambien 10 mg qhs now.. \par - continue  gabapentin but try to use lower dose 300 mg TID or less ideally... \par - needs updated MRI LS and call to neurosx/ interventional PM right away for return bowel incontinence \par - needs start exercise regimen.. start by tracking current exercise.. then progress. If unable to do so will need PT dedicated to progressive \par - labs before next visit still needed  .\par - ov 2 m with RPA.. \par \par Audio only 20 mins \par \par

## 2020-10-01 NOTE — HISTORY OF PRESENT ILLNESS
[Home] : at home, [unfilled] , at the time of the visit. [Medical Office: ([facility])___] : at the medical office located in  [Verbal consent obtained from patient] : the patient, [unfilled] [FreeTextEntry1] : Verbal consent given on 10/01/2020 at 16:24 by PHOENIX HOOD, [].\par Amwell failed, telephone visit \par \par 10/1/20 \par - overall doing better w/ ambien and restarting gabapentin.. \par - bowel incontinence again returned June 2020- hx of similar episode nearly 1 yr ago + response to MIKE x 1 after Dr. Hood (neurosx) felt not sx candidate.  \par - otherwise stable overall.. \par \par \par 1) FM\par 2) Dizziness\par 3) R wrist fracture 5/20 s/p fall 2/2 dizziness  \par \par 6/2/20 Initial HPI\par 51 yo AAF w/ hx Migraines followiing w/ Dr. Sanchez.. started on Topamax... and dx x many ys FM.. Current symptoms:  \par Diffuse arthralgias/ myalgias "everywhere" w/ no overt joint swelling. Pain varies.. fairly controlled, pain worse as day progresses\par Migraines as noted improved lately w/ topamax \par BRENDAN works w/ psychologist on trazodone, duloxetine 40 mg daily and 75 mg wellbutrin... and psychotherapy..\par Sleep:  requires trazodone w/ + response, can't sleep without. Trouble falling/ staying asleep even with 200 mg trazodone and 50 mg topiramate... \par No formal exercise "was told not to... and got in trouble when tried" \par NOTE:\par - Seroquel 300 mg in past- off past yr stopped for logistical reasons... \par - gabapentin 600 mg TID w/ + response, stopped when provider left\par - Xanax poor response.. too dizzy and fell "into a bathtub"\par - Ambien excellent respones helped with sleep \par \par \par 2) Recent fall:  does have hx vertigo.. but can't remember why fell.  \par R wrist fracture... w/ recent fall- ? etiology of fall unknown, w/u in progress\par Dizziness persists.. can't afford balance tx... not NEW \par \par 3) Anemia:  s/p ADRIA 2015 w/ improvement since then..\par \par 4) Obesity:  s/p gastric sleeve 6/2018 total 75 lb ... now 187.. stable in past 3 m\par \par Labs 5/28/20 low T4 0.4 w/ nl TSH 2, Vit D 27 nl CBC w/ plt 480 , CMP, folate/ B12, UA, Hba1c 5.5 Covid Ab neg

## 2020-10-01 NOTE — REVIEW OF SYSTEMS
[Constipation] : constipation [Heartburn] : heartburn [Arthralgias] : arthralgias [Joint Pain] : joint pain [As Noted in HPI] : as noted in HPI [Sleep Disturbances] : sleep disturbances [Anxiety] : anxiety [Negative] : Heme/Lymph [FreeTextEntry2] : overall improved  [FreeTextEntry7] : mild persistent  [de-identified] : better on current regimen

## 2020-10-18 ENCOUNTER — OUTPATIENT (OUTPATIENT)
Dept: OUTPATIENT SERVICES | Facility: HOSPITAL | Age: 50
LOS: 1 days | End: 2020-10-18

## 2020-10-18 ENCOUNTER — APPOINTMENT (OUTPATIENT)
Dept: MRI IMAGING | Facility: CLINIC | Age: 50
End: 2020-10-18

## 2020-10-18 DIAGNOSIS — Z98.89 OTHER SPECIFIED POSTPROCEDURAL STATES: Chronic | ICD-10-CM

## 2020-10-18 DIAGNOSIS — Z98.51 TUBAL LIGATION STATUS: Chronic | ICD-10-CM

## 2020-10-18 DIAGNOSIS — M54.16 RADICULOPATHY, LUMBAR REGION: ICD-10-CM

## 2020-10-19 ENCOUNTER — APPOINTMENT (OUTPATIENT)
Dept: NEUROLOGY | Facility: CLINIC | Age: 50
End: 2020-10-19
Payer: MEDICAID

## 2020-10-19 PROCEDURE — 99213 OFFICE O/P EST LOW 20 MIN: CPT | Mod: 95

## 2020-10-19 NOTE — PHYSICAL EXAM
[FreeTextEntry1] : Neurologic examination was limited due to the video call.\par \par Mental status: Awake and alert fully oriented to person place and time. There is no aphasia.\par \par Cranial nerves:  Full extraocular movements. There is no nystagmus. Normal facial sensation and motor function. \par \par Motor: no clear weakness in BUE\par \par Sensation: Light touch was intact in BUE\par \par Coordination: Deferred\par \par Gait: Deferred\par

## 2020-10-19 NOTE — ASSESSMENT
[FreeTextEntry1] : This is a 50-year-old woman with migraine headaches that are somewhat controlled with Topamax with Imitrex as needed for breakthrough headaches. We will continue this. I am much more concerned about his recurrent incontinence. I am not able to examine her fully over video no make every effort to bring her into the office for an impression exam as soon as possible. She is scheduled to have a lumbar spine MRI which she missed yesterday biting told her how important it was for her to get it done as soon as possible. Last time this happened she benefited from epidural steroid injection. Prior to seeing whether or not she benefited from the injection I would like to see her in person for an exam as well as see the results of the MRI. Further guidance for her treatment can be provided once I see her and the MRI.

## 2020-10-19 NOTE — HISTORY OF PRESENT ILLNESS
[Home] : at home, [unfilled] , at the time of the visit. [Medical Office: ([facility])___] : at the medical office located in  [FreeTextEntry1] : Initial office visit February 18, 2020:\par This is a 49-year-old woman who presents today for evaluation of migraine headache. She previously seen different neurologist who had prescribed Topamax. She is not sure of the dose. She has been getting the Topamax from her psychiatrist and they are only comfortable prescribing 25 mg twice a day. If this dose she is having 3 headaches per week. The headaches are accompanied with nausea vomiting photophobia and the need to stay in a dark quiet room. She had tried Imitrex for them but probably wasn't taking in as soon as the headache came on. She states that it got worse before it got better and then made her feel heavy. She is here today for neurologic evaluation and treatment of these frequent migraine headaches.\par \par Followup May 18, 2020:\par This is a 50-year-old woman who presents today for followup of migraine headache. This visit was done via video conferencing due to the corona virus outbreak. Verbal consent was obtained at the time of the visit. Since starting the Topamax her headache frequency has gone down. Typically she'll take Imitrex for breakthrough headaches which works. Since starting the Topamax she is dropped her headache frequency by over 50%. She is here today for neurologic followup evaluation via video.\par \par Followup October 19, 2020:\par This is a 50-year-old woman who presents today for followup of migraine headache as well as a recurrence of incontinence. The visit was done via video conferencing during the monty virus outbreak. She previously provided consent for video visits. She states as far as her migraines are concerned there is slightly increased she's having a bit more stress at this time. She states that this is expected during times of stress. She is taking the Topamax without side effects. She is concerned because she has new onset of incontinence. This apparently started in June but did not tell anyone. She was scheduled to have a lumbar spine MRI yesterday as ordered by her rheumatologist but she missed the appointment. When asked how often she is having incontinence she states to often it sounds like it's daily/multiple times per day. It is both bowel and bladder. She states her legs are slightly weak. She is here today for neurologic followup via video.\par \par  [Verbal consent obtained from patient] : the patient, [unfilled]

## 2020-10-19 NOTE — CONSULT LETTER
[Dear  ___] : Dear  [unfilled], [Courtesy Letter:] : I had the pleasure of seeing your patient, [unfilled], in my office today. [Please see my note below.] : Please see my note below. [Consult Closing:] : Thank you very much for allowing me to participate in the care of this patient.  If you have any questions, please do not hesitate to contact me. [Sincerely,] : Sincerely, [FreeTextEntry3] : Viraj Sanchez M.D., Ph.D. DPN-N\par [facility]\par Neurology at [place]\par Medical Director of Stroke Services\par [facility]\par

## 2020-10-20 ENCOUNTER — APPOINTMENT (OUTPATIENT)
Dept: MRI IMAGING | Facility: CLINIC | Age: 50
End: 2020-10-20
Payer: MEDICAID

## 2020-10-20 ENCOUNTER — OUTPATIENT (OUTPATIENT)
Dept: OUTPATIENT SERVICES | Facility: HOSPITAL | Age: 50
LOS: 1 days | End: 2020-10-20
Payer: MEDICAID

## 2020-10-20 DIAGNOSIS — Z98.51 TUBAL LIGATION STATUS: Chronic | ICD-10-CM

## 2020-10-20 DIAGNOSIS — Z98.89 OTHER SPECIFIED POSTPROCEDURAL STATES: Chronic | ICD-10-CM

## 2020-10-20 DIAGNOSIS — M54.16 RADICULOPATHY, LUMBAR REGION: ICD-10-CM

## 2020-10-20 PROCEDURE — 72148 MRI LUMBAR SPINE W/O DYE: CPT

## 2020-10-20 PROCEDURE — 72148 MRI LUMBAR SPINE W/O DYE: CPT | Mod: 26

## 2020-10-26 ENCOUNTER — APPOINTMENT (OUTPATIENT)
Dept: NEUROLOGY | Facility: CLINIC | Age: 50
End: 2020-10-26
Payer: MEDICAID

## 2020-10-26 VITALS
SYSTOLIC BLOOD PRESSURE: 118 MMHG | DIASTOLIC BLOOD PRESSURE: 80 MMHG | WEIGHT: 187 LBS | BODY MASS INDEX: 34.41 KG/M2 | HEIGHT: 62 IN

## 2020-10-26 PROCEDURE — 99214 OFFICE O/P EST MOD 30 MIN: CPT

## 2020-10-26 PROCEDURE — 99072 ADDL SUPL MATRL&STAF TM PHE: CPT

## 2020-10-26 NOTE — REVIEW OF SYSTEMS
[Leg Weakness] : leg weakness [As Noted in HPI] : as noted in HPI [Incontinence] : incontinence [Negative] : Heme/Lymph [FreeTextEntry7] : Incontinence

## 2020-10-26 NOTE — DATA REVIEWED
[de-identified] : Lumbar spine MRI was reviewed. Results are on the chart. Briefly there is disc protrusions at L4-5 and L5-S1 which did not cause significant central canal stenosis. There was stenosis at L5-S1 I contacted the descending left S1 nerve root.

## 2020-10-26 NOTE — ASSESSMENT
[FreeTextEntry1] : This is a 50-year-old woman with controlled headaches. She is also having bowel and bladder incontinence. At this point I will send her for cervical and thoracic spine MRI to evaluate any pathology that may be causing incontinence. She did not have any myelopathic signs on her exam. I will see her back in 2 months for call with results of the MRI of her cervical thoracic spine and develop any further treatment plan as needed.

## 2020-10-26 NOTE — HISTORY OF PRESENT ILLNESS
[FreeTextEntry1] : Initial office visit February 18, 2020:\par This is a 49-year-old woman who presents today for evaluation of migraine headache. She previously seen different neurologist who had prescribed Topamax. She is not sure of the dose. She has been getting the Topamax from her psychiatrist and they are only comfortable prescribing 25 mg twice a day. If this dose she is having 3 headaches per week. The headaches are accompanied with nausea vomiting photophobia and the need to stay in a dark quiet room. She had tried Imitrex for them but probably wasn't taking in as soon as the headache came on. She states that it got worse before it got better and then made her feel heavy. She is here today for neurologic evaluation and treatment of these frequent migraine headaches.\par \par Followup May 18, 2020:\par This is a 50-year-old woman who presents today for followup of migraine headache. This visit was done via video conferencing due to the corona virus outbreak. Verbal consent was obtained at the time of the visit. Since starting the Topamax her headache frequency has gone down. Typically she'll take Imitrex for breakthrough headaches which works. Since starting the Topamax she is dropped her headache frequency by over 50%. She is here today for neurologic followup evaluation via video.\par \par Followup October 19, 2020:\par This is a 50-year-old woman who presents today for followup of migraine headache as well as a recurrence of incontinence. The visit was done via video conferencing during the monty virus outbreak. She previously provided consent for video visits. She states as far as her migraines are concerned there is slightly increased she's having a bit more stress at this time. She states that this is expected during times of stress. She is taking the Topamax without side effects. She is concerned because she has new onset of incontinence. This apparently started in June but did not tell anyone. She was scheduled to have a lumbar spine MRI yesterday as ordered by her rheumatologist but she missed the appointment. When asked how often she is having incontinence she states to often it sounds like it's daily/multiple times per day. It is both bowel and bladder. She states her legs are slightly weak. She is here today for neurologic followup via video.\par \par Followup October 26, 2020:\par This is a 50-year-old woman who presents today for a duration of bowel and bladder incontinence. I seen her last week via video visit for headaches. While her headaches were more or less under control she did express that she was having incontinence of both bowel and gallbladder. I had her followup up with me in the office to do a proper neurologic examination, but was unable to be done via video. Today she states that she is having both bowel and bladder incontinence. The balance constant seems almost in urge or overflow incontinence and Effexor more she can't get to the bathroom in time. However her bladder incontinence is more spontaneous. She states one day she was on the telephone I did not know that she was incontinent until she got up and she was wet. Additionally she states that she's had divided mattress due to incontinence. She has a bilateral lower extremity weakness which may be attributable to her lumbar radiculopathy. She had a lumbar spine MRI ordered which showed degenerative changes but no clear reason for incontinence. She is here today for neurologic followup.

## 2020-10-26 NOTE — CONSULT LETTER
[Dear  ___] : Dear  [unfilled], [Courtesy Letter:] : I had the pleasure of seeing your patient, [unfilled], in my office today. [Please see my note below.] : Please see my note below. [Consult Closing:] : Thank you very much for allowing me to participate in the care of this patient.  If you have any questions, please do not hesitate to contact me. [Sincerely,] : Sincerely, [FreeTextEntry3] : Viraj Sanchez M.D., Ph.D. DPN-N\par [facility]\par Neurology at [place]\par Medical Director of Stroke Services\par [facility]\par

## 2020-10-26 NOTE — PHYSICAL EXAM
[Person] : oriented to person [Place] : oriented to place [Time] : oriented to time [Remote Intact] : remote memory intact [Span Intact] : the attention span was normal [Concentration Intact] : normal concentrating ability [Visual Intact] : visual attention was ~T not ~L decreased [Naming Objects] : no difficulty naming common objects [Repeating Phrases] : no difficulty repeating a phrase [Writing A Sentence] : no difficulty writing a sentence [Fluency] : fluency intact [Comprehension] : comprehension intact [Reading] : reading intact [Current Events] : adequate knowledge of current events [Past History] : adequate knowledge of personal past history [Cranial Nerves Optic (II)] : visual acuity intact bilaterally,  visual fields full to confrontation, pupils equal round and reactive to light [Cranial Nerves Oculomotor (III)] : extraocular motion intact [Cranial Nerves Trigeminal (V)] : facial sensation intact symmetrically [Cranial Nerves Facial (VII)] : face symmetrical [Cranial Nerves Vestibulocochlear (VIII)] : hearing was intact bilaterally [Cranial Nerves Glossopharyngeal (IX)] : tongue and palate midline [Cranial Nerves Accessory (XI - Cranial And Spinal)] : head turning and shoulder shrug symmetric [Cranial Nerves Hypoglossal (XII)] : there was no tongue deviation with protrusion [Motor Tone] : muscle tone was normal in all four extremities [Motor Strength] : muscle strength was normal in all four extremities [Involuntary Movements] : no involuntary movements were seen [No Muscle Atrophy] : normal bulk in all four extremities [Paresis Pronator Drift Right-Sided] : no pronator drift on the right [Paresis Pronator Drift Left-Sided] : no pronator drift on the left [Motor Strength Upper Extremities Bilaterally] : strength was normal in both upper extremities [Motor Strength Lower Extremities Bilaterally] : there was weakness in both lower extremities [Sensation Tactile Decrease] : light touch was intact [Proprioception] : proprioception was intact [Pain / Temp Decrease Entire Leg - Right] : diminished right leg [Vibration Decrease Arm / Hand Right] : normal in the right arm and hand [Abnormal Walk] : normal gait [Balance] : balance was intact [Tremor] : no tremor present [Coordination - Dysmetria Impaired Finger-to-Nose Bilateral] : not present [1+] : Patella left 1+ [Plantar Reflex Left Only] : normal on the left [___] : absent on the right [FreeTextEntry6] : Mild bilateral leg weakness [Sclera] : the sclera and conjunctiva were normal [PERRL With Normal Accommodation] : pupils were equal in size, round, reactive to light, with normal accommodation [Extraocular Movements] : extraocular movements were intact [No APD] : no afferent pupillary defect [No LISA] : no internuclear ophthalmoplegia [Full Visual Field] : full visual field

## 2020-11-14 ENCOUNTER — APPOINTMENT (OUTPATIENT)
Dept: MRI IMAGING | Facility: CLINIC | Age: 50
End: 2020-11-14
Payer: MEDICAID

## 2020-11-14 PROCEDURE — 72141 MRI NECK SPINE W/O DYE: CPT | Mod: 26

## 2020-11-15 ENCOUNTER — OUTPATIENT (OUTPATIENT)
Dept: OUTPATIENT SERVICES | Facility: HOSPITAL | Age: 50
LOS: 1 days | End: 2020-11-15
Payer: MEDICAID

## 2020-11-15 ENCOUNTER — APPOINTMENT (OUTPATIENT)
Dept: MRI IMAGING | Facility: CLINIC | Age: 50
End: 2020-11-15
Payer: MEDICAID

## 2020-11-15 DIAGNOSIS — Z98.89 OTHER SPECIFIED POSTPROCEDURAL STATES: Chronic | ICD-10-CM

## 2020-11-15 DIAGNOSIS — Z00.8 ENCOUNTER FOR OTHER GENERAL EXAMINATION: ICD-10-CM

## 2020-11-15 DIAGNOSIS — Z98.51 TUBAL LIGATION STATUS: Chronic | ICD-10-CM

## 2020-11-15 PROCEDURE — 72146 MRI CHEST SPINE W/O DYE: CPT | Mod: 26

## 2020-11-15 PROCEDURE — 72146 MRI CHEST SPINE W/O DYE: CPT

## 2020-11-17 ENCOUNTER — APPOINTMENT (OUTPATIENT)
Dept: NEUROLOGY | Facility: CLINIC | Age: 50
End: 2020-11-17

## 2020-11-20 ENCOUNTER — NON-APPOINTMENT (OUTPATIENT)
Age: 50
End: 2020-11-20

## 2020-12-02 ENCOUNTER — OUTPATIENT (OUTPATIENT)
Dept: OUTPATIENT SERVICES | Facility: HOSPITAL | Age: 50
LOS: 1 days | End: 2020-12-02
Payer: MEDICAID

## 2020-12-02 DIAGNOSIS — Z98.89 OTHER SPECIFIED POSTPROCEDURAL STATES: Chronic | ICD-10-CM

## 2020-12-02 DIAGNOSIS — Z11.59 ENCOUNTER FOR SCREENING FOR OTHER VIRAL DISEASES: ICD-10-CM

## 2020-12-02 DIAGNOSIS — Z98.51 TUBAL LIGATION STATUS: Chronic | ICD-10-CM

## 2020-12-02 LAB — SARS-COV-2 RNA SPEC QL NAA+PROBE: SIGNIFICANT CHANGE UP

## 2020-12-02 PROCEDURE — U0003: CPT

## 2020-12-03 ENCOUNTER — OUTPATIENT (OUTPATIENT)
Dept: OUTPATIENT SERVICES | Facility: HOSPITAL | Age: 50
LOS: 1 days | End: 2020-12-03
Payer: MEDICAID

## 2020-12-03 DIAGNOSIS — M54.16 RADICULOPATHY, LUMBAR REGION: ICD-10-CM

## 2020-12-03 DIAGNOSIS — Z98.89 OTHER SPECIFIED POSTPROCEDURAL STATES: Chronic | ICD-10-CM

## 2020-12-03 DIAGNOSIS — Z98.51 TUBAL LIGATION STATUS: Chronic | ICD-10-CM

## 2020-12-03 PROCEDURE — 62323 NJX INTERLAMINAR LMBR/SAC: CPT

## 2021-01-05 ENCOUNTER — NON-APPOINTMENT (OUTPATIENT)
Age: 51
End: 2021-01-05

## 2021-01-05 ENCOUNTER — APPOINTMENT (OUTPATIENT)
Dept: INTERNAL MEDICINE | Facility: CLINIC | Age: 51
End: 2021-01-05
Payer: MEDICAID

## 2021-01-05 VITALS — RESPIRATION RATE: 12 BRPM | SYSTOLIC BLOOD PRESSURE: 112 MMHG | DIASTOLIC BLOOD PRESSURE: 78 MMHG | HEART RATE: 70 BPM

## 2021-01-05 VITALS — HEIGHT: 62 IN | WEIGHT: 200 LBS | BODY MASS INDEX: 36.8 KG/M2

## 2021-01-05 PROCEDURE — 99214 OFFICE O/P EST MOD 30 MIN: CPT

## 2021-01-05 PROCEDURE — 99072 ADDL SUPL MATRL&STAF TM PHE: CPT

## 2021-01-05 NOTE — REVIEW OF SYSTEMS
[Headache] : headache [Dizziness] : dizziness [Fainting] : no fainting [Confusion] : no confusion [Negative] : Heme/Lymph

## 2021-01-05 NOTE — ASSESSMENT
[FreeTextEntry1] : concussion\par ct of the head but has dizzy feeling with some flshing lights\par neck pain\par low back pain\par obtain x ray to rule out fracture\par avoid nsaids due to gi issues\par try flexeril\par rest should help with tylenol\par

## 2021-01-05 NOTE — HISTORY OF PRESENT ILLNESS
[FreeTextEntry1] : fall a\par  [de-identified] : patent feel getting into her car [employer]\par hit head\par and back and neck hurting\par feels woozy but no loss of conciousness\par did see lights\par no nvd no palpitations

## 2021-01-09 ENCOUNTER — OUTPATIENT (OUTPATIENT)
Dept: OUTPATIENT SERVICES | Facility: HOSPITAL | Age: 51
LOS: 1 days | End: 2021-01-09
Payer: MEDICAID

## 2021-01-09 ENCOUNTER — APPOINTMENT (OUTPATIENT)
Dept: CT IMAGING | Facility: CLINIC | Age: 51
End: 2021-01-09
Payer: MEDICAID

## 2021-01-09 ENCOUNTER — APPOINTMENT (OUTPATIENT)
Dept: RADIOLOGY | Facility: CLINIC | Age: 51
End: 2021-01-09
Payer: MEDICAID

## 2021-01-09 ENCOUNTER — RESULT REVIEW (OUTPATIENT)
Age: 51
End: 2021-01-09

## 2021-01-09 DIAGNOSIS — Z98.89 OTHER SPECIFIED POSTPROCEDURAL STATES: Chronic | ICD-10-CM

## 2021-01-09 DIAGNOSIS — Z98.51 TUBAL LIGATION STATUS: Chronic | ICD-10-CM

## 2021-01-09 DIAGNOSIS — S06.0X0A CONCUSSION WITHOUT LOSS OF CONSCIOUSNESS, INITIAL ENCOUNTER: ICD-10-CM

## 2021-01-09 PROCEDURE — 72110 X-RAY EXAM L-2 SPINE 4/>VWS: CPT | Mod: 26

## 2021-01-09 PROCEDURE — 70450 CT HEAD/BRAIN W/O DYE: CPT | Mod: 26

## 2021-01-09 PROCEDURE — 72110 X-RAY EXAM L-2 SPINE 4/>VWS: CPT

## 2021-01-09 PROCEDURE — 72040 X-RAY EXAM NECK SPINE 2-3 VW: CPT | Mod: 26

## 2021-01-09 PROCEDURE — 72040 X-RAY EXAM NECK SPINE 2-3 VW: CPT

## 2021-01-09 PROCEDURE — 70450 CT HEAD/BRAIN W/O DYE: CPT

## 2021-01-11 ENCOUNTER — NON-APPOINTMENT (OUTPATIENT)
Age: 51
End: 2021-01-11

## 2021-01-20 ENCOUNTER — APPOINTMENT (OUTPATIENT)
Dept: NEUROLOGY | Facility: CLINIC | Age: 51
End: 2021-01-20
Payer: MEDICAID

## 2021-01-20 PROCEDURE — 99214 OFFICE O/P EST MOD 30 MIN: CPT | Mod: 95

## 2021-01-20 NOTE — ASSESSMENT
[FreeTextEntry1] : This is a 50-year-old woman who seen for migraines. They're little bit worse especially after her fall. She will continue sumatriptan as needed. She'll continue gabapentin and Topamax. She also had a fall which resulted in muscle spasm in her neck and back. She is responding somewhat to Flexeril but not entirely. I will add physical therapy for her. She is seeing gastroenterology for further evaluation of fecal incontinence. MRI of her cervical and thoracic spine did not show any spinal cord abnormalities to suggest etiology of this incontinence.I will see her back in the office in 3 months, sooner should the need arise.

## 2021-01-20 NOTE — HISTORY OF PRESENT ILLNESS
[Home] : at home, [unfilled] , at the time of the visit. [Medical Office: ([facility])___] : at the medical office located in  [Verbal consent obtained from patient] : the patient, [unfilled] [FreeTextEntry1] : Initial office visit February 18, 2020:\par This is a 49-year-old woman who presents today for evaluation of migraine headache. She previously seen different neurologist who had prescribed Topamax. She is not sure of the dose. She has been getting the Topamax from her psychiatrist and they are only comfortable prescribing 25 mg twice a day. If this dose she is having 3 headaches per week. The headaches are accompanied with nausea vomiting photophobia and the need to stay in a dark quiet room. She had tried Imitrex for them but probably wasn't taking in as soon as the headache came on. She states that it got worse before it got better and then made her feel heavy. She is here today for neurologic evaluation and treatment of these frequent migraine headaches.\par \par Followup May 18, 2020:\par This is a 50-year-old woman who presents today for followup of migraine headache. This visit was done via video conferencing due to the corona virus outbreak. Verbal consent was obtained at the time of the visit. Since starting the Topamax her headache frequency has gone down. Typically she'll take Imitrex for breakthrough headaches which works. Since starting the Topamax she is dropped her headache frequency by over 50%. She is here today for neurologic followup evaluation via video.\par \par Followup October 19, 2020:\par This is a 50-year-old woman who presents today for followup of migraine headache as well as a recurrence of incontinence. The visit was done via video conferencing during the monty virus outbreak. She previously provided consent for video visits. She states as far as her migraines are concerned there is slightly increased she's having a bit more stress at this time. She states that this is expected during times of stress. She is taking the Topamax without side effects. She is concerned because she has new onset of incontinence. This apparently started in June but did not tell anyone. She was scheduled to have a lumbar spine MRI yesterday as ordered by her rheumatologist but she missed the appointment. When asked how often she is having incontinence she states to often it sounds like it's daily/multiple times per day. It is both bowel and bladder. She states her legs are slightly weak. She is here today for neurologic followup via video.\par \par Followup October 26, 2020:\par This is a 50-year-old woman who presents today for a duration of bowel and bladder incontinence. I seen her last week via video visit for headaches. While her headaches were more or less under control she did express that she was having incontinence of both bowel and gallbladder. I had her followup up with me in the office to do a proper neurologic examination, but was unable to be done via video. Today she states that she is having both bowel and bladder incontinence. The balance constant seems almost in urge or overflow incontinence and Effexor more she can't get to the bathroom in time. However her bladder incontinence is more spontaneous. She states one day she was on the telephone I did not know that she was incontinent until she got up and she was wet. Additionally she states that she's had divided mattress due to incontinence. She has a bilateral lower extremity weakness which may be attributable to her lumbar radiculopathy. She had a lumbar spine MRI ordered which showed degenerative changes but no clear reason for incontinence. She is here today for neurologic followup.\par \par Followup January 20, 2021:\par This is a 50-year-old woman who presents today for followup for migraine headache. She also had bowel and bladder incontinence and a new problem of neck and back pain following fall. She is doing well from a migraine perspective with maybe a slight increase in the migraines only following the fall possibly due to her neck pain. She had one episode of bowel incontinence since being seen last and she has a followup appointment with gastroenterology. She also fell on New Year's and had a CT of her head which I reviewed which was normal without any acute pathology. She also had neck and lumbar spine x-rays which showed muscle spasm. She was started on Flexeril with some relief but not entirely relieved. She is here today for neurologic followup and evaluation.

## 2021-01-20 NOTE — DATA REVIEWED
[de-identified] : His CT of her head done in January of this year which I reviewed.It did not show acute shrug mass or bleed.\par \par Cervical spine x-ray was reviewed and showed straightening of the normal cervical lordosis and mild degenerative changes.\par \par Lumbar spine x-ray was reviewed and showed straightening of the normal lumbar lordosis. It showed mild degenerative changes as well.\par \par MRI of the cervical and thoracic spines were reviewed. There is mild degenerative changes without evidence for cord compression or intrinsic cord signal abnormality

## 2021-01-20 NOTE — REVIEW OF SYSTEMS
[Migraine Headache] : migraine headaches [As Noted in HPI] : as noted in HPI [Negative] : Heme/Lymph [de-identified] : Neck and back pain [FreeTextEntry9] : Neck and back pain

## 2021-01-20 NOTE — CONSULT LETTER
[Dear  ___] : Dear  [unfilled], [Courtesy Letter:] : I had the pleasure of seeing your patient, [unfilled], in my office today. [Please see my note below.] : Please see my note below. [Consult Closing:] : Thank you very much for allowing me to participate in the care of this patient.  If you have any questions, please do not hesitate to contact me. [Sincerely,] : Sincerely, [FreeTextEntry3] : Viraj Sanchez M.D., Ph.D. DPN-N\par [facility]\par Neurology at [place]\par Medical Director of Stroke Services\par [facility]\par

## 2021-02-09 ENCOUNTER — RX RENEWAL (OUTPATIENT)
Age: 51
End: 2021-02-09

## 2021-02-24 ENCOUNTER — APPOINTMENT (OUTPATIENT)
Dept: GASTROENTEROLOGY | Facility: CLINIC | Age: 51
End: 2021-02-24
Payer: MEDICAID

## 2021-02-24 VITALS
BODY MASS INDEX: 37.91 KG/M2 | RESPIRATION RATE: 14 BRPM | DIASTOLIC BLOOD PRESSURE: 78 MMHG | SYSTOLIC BLOOD PRESSURE: 117 MMHG | TEMPERATURE: 97.2 F | HEART RATE: 86 BPM | HEIGHT: 62 IN | OXYGEN SATURATION: 98 % | WEIGHT: 206 LBS

## 2021-02-24 PROCEDURE — 99203 OFFICE O/P NEW LOW 30 MIN: CPT

## 2021-02-24 PROCEDURE — 99072 ADDL SUPL MATRL&STAF TM PHE: CPT
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
## 2021-02-24 NOTE — ASSESSMENT
[FreeTextEntry1] : I discussed with the patient that her weight gain is not of any specific gastrointestinal etiology.  She is on multiple medications.  Topiramate is known to cause weight gain and I suggested she discuss her various medications with her primary care physician about possibly changing them.  Bupropion is usually associate with weight loss and the other medications can be associated with mild weight gain but is uncommon.  In addition I did mention to her that her FT4 was low and felt her thyroid function should be repeated including an FT4 and TSH as hypothyroidism can also contribute to her weight gain.  Finally we just discussed dieting as well as the most important factor with weight gain presuming there is no influence from thyroid disease or her medication.  She has an appointment to see her primary care physician in the next week or 2 and I advised her to discuss these issues with him as well as to review her thyroid function test which will be done now.

## 2021-02-25 LAB
T4 FREE SERPL-MCNC: 0.9 NG/DL
TSH SERPL-ACNC: 1.58 UIU/ML

## 2021-03-11 ENCOUNTER — APPOINTMENT (OUTPATIENT)
Dept: INTERNAL MEDICINE | Facility: CLINIC | Age: 51
End: 2021-03-11
Payer: MEDICAID

## 2021-03-11 VITALS
SYSTOLIC BLOOD PRESSURE: 118 MMHG | WEIGHT: 208 LBS | DIASTOLIC BLOOD PRESSURE: 78 MMHG | HEIGHT: 62 IN | BODY MASS INDEX: 38.28 KG/M2 | HEART RATE: 78 BPM

## 2021-03-11 DIAGNOSIS — M77.12 LATERAL EPICONDYLITIS, LEFT ELBOW: ICD-10-CM

## 2021-03-11 PROCEDURE — 99213 OFFICE O/P EST LOW 20 MIN: CPT

## 2021-03-11 PROCEDURE — 99072 ADDL SUPL MATRL&STAF TM PHE: CPT

## 2021-03-11 NOTE — PHYSICAL EXAM
[Normal] : normal rate, regular rhythm, normal S1 and S2 and no murmur heard [de-identified] : right shoulder pain, left elbow pain  no pain on passive rotation but cant lift right shoulder alone

## 2021-03-11 NOTE — HISTORY OF PRESENT ILLNESS
[FreeTextEntry1] : right shoulder pain\par  [de-identified] : right shoulder pain \par left elbow pain\par was cleaining stove and right shoulder locked up\par has no chest pain sob \par nvd or palpitations\par she is planning on moving and was doing some work on the house

## 2021-03-11 NOTE — ASSESSMENT
[FreeTextEntry1] : right shoulder pain \par left elbow pain \par steroids oral for one week\par rest ice\par see ortho Dr. Nicholson if not better\par lumbar pain, was planning breast surgery to see if pain improves with breast \par reduction but needs to lose a bit of weight prior\par follow up cpe in May [place]
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
## 2021-03-16 ENCOUNTER — APPOINTMENT (OUTPATIENT)
Dept: NEUROLOGY | Facility: CLINIC | Age: 51
End: 2021-03-16
Payer: MEDICAID

## 2021-03-16 DIAGNOSIS — S06.0X0A CONCUSSION W/OUT LOSS OF CONSCIOUSNESS, INITIAL ENCOUNTER: ICD-10-CM

## 2021-03-16 DIAGNOSIS — M62.838 OTHER MUSCLE SPASM: ICD-10-CM

## 2021-03-16 PROCEDURE — 99214 OFFICE O/P EST MOD 30 MIN: CPT | Mod: 95

## 2021-03-16 NOTE — HISTORY OF PRESENT ILLNESS
[Home] : at home, [unfilled] , at the time of the visit. [Medical Office: ([facility])___] : at the medical office located in  [Verbal consent obtained from patient] : the patient, [unfilled] [FreeTextEntry1] : Initial office visit February 18, 2020:\par This is a 49-year-old woman who presents today for evaluation of migraine headache. She previously seen different neurologist who had prescribed Topamax. She is not sure of the dose. She has been getting the Topamax from her psychiatrist and they are only comfortable prescribing 25 mg twice a day. If this dose she is having 3 headaches per week. The headaches are accompanied with nausea vomiting photophobia and the need to stay in a dark quiet room. She had tried Imitrex for them but probably wasn't taking in as soon as the headache came on. She states that it got worse before it got better and then made her feel heavy. She is here today for neurologic evaluation and treatment of these frequent migraine headaches.\par \par Followup May 18, 2020:\par This is a 50-year-old woman who presents today for followup of migraine headache. This visit was done via video conferencing due to the corona virus outbreak. Verbal consent was obtained at the time of the visit. Since starting the Topamax her headache frequency has gone down. Typically she'll take Imitrex for breakthrough headaches which works. Since starting the Topamax she is dropped her headache frequency by over 50%. She is here today for neurologic followup evaluation via video.\par \par Followup October 19, 2020:\par This is a 50-year-old woman who presents today for followup of migraine headache as well as a recurrence of incontinence. The visit was done via video conferencing during the monty virus outbreak. She previously provided consent for video visits. She states as far as her migraines are concerned there is slightly increased she's having a bit more stress at this time. She states that this is expected during times of stress. She is taking the Topamax without side effects. She is concerned because she has new onset of incontinence. This apparently started in June but did not tell anyone. She was scheduled to have a lumbar spine MRI yesterday as ordered by her rheumatologist but she missed the appointment. When asked how often she is having incontinence she states to often it sounds like it's daily/multiple times per day. It is both bowel and bladder. She states her legs are slightly weak. She is here today for neurologic followup via video.\par \par Followup October 26, 2020:\par This is a 50-year-old woman who presents today for a duration of bowel and bladder incontinence. I seen her last week via video visit for headaches. While her headaches were more or less under control she did express that she was having incontinence of both bowel and gallbladder. I had her followup up with me in the office to do a proper neurologic examination, but was unable to be done via video. Today she states that she is having both bowel and bladder incontinence. The balance constant seems almost in urge or overflow incontinence and Effexor more she can't get to the bathroom in time. However her bladder incontinence is more spontaneous. She states one day she was on the telephone I did not know that she was incontinent until she got up and she was wet. Additionally she states that she's had divided mattress due to incontinence. She has a bilateral lower extremity weakness which may be attributable to her lumbar radiculopathy. She had a lumbar spine MRI ordered which showed degenerative changes but no clear reason for incontinence. She is here today for neurologic followup.\par \par Followup January 20, 2021:\par This is a 50-year-old woman who presents today for followup for migraine headache. She also had bowel and bladder incontinence and a new problem of neck and back pain following fall. She is doing well from a migraine perspective with maybe a slight increase in the migraines only following the fall possibly due to her neck pain. She had one episode of bowel incontinence since being seen last and she has a followup appointment with gastroenterology. She also fell on New Year's and had a CT of her head which I reviewed which was normal without any acute pathology. She also had neck and lumbar spine x-rays which showed muscle spasm. She was started on Flexeril with some relief but not entirely relieved. She is here today for neurologic followup and evaluation.\par \par Followup March 16, 2021:\par This is a 50-year-old woman who presents today for followup of migraine headache as well as neck pain and concussion. She is seen by video visit during the COVID outbreak.. Verbal consent is obtained at the beginning of the visit. She had a fall on New Year's Day which affected her shoulders as well as her neck. She is having worsening headaches. She seems to be under a lot of stress as well. She tries to take Imitrex for these headaches and it is not helping. She is not able to take ibuprofen or NSAIDs because of gastric sleep surgery. She reports no new symptoms of her concussion that she suffered from a fall. Primarily she is having musculoskeletal pain in the neck shoulders which is leading to tension type headache. This has been worsening since she was seen last. She is here today for neurologic followup.

## 2021-03-16 NOTE — ASSESSMENT
[FreeTextEntry1] : This is a 50-year-old woman with migraine headache. She is currently on prophylactic medication of gabapentin as well as Topamax. She utilizes sumatriptan for breakthrough headaches. Since her fall I think she's having more tension-type headache and migraine. Sumatriptan as effective. I advised her to utilize Tylenol. She cannot take NSAIDs because of gastric surgery. I will send her a prescription for physical therapy to see if this may help alleviate some of her pain. I will see her back in the office in 3 months, sooner should the need arise.

## 2021-03-16 NOTE — REVIEW OF SYSTEMS
[Migraine Headache] : migraine headaches [Tension Headache] : tension-type headaches [As Noted in HPI] : as noted in HPI [Negative] : Heme/Lymph [FreeTextEntry9] : Neck and shoulder pain

## 2021-03-16 NOTE — CONSULT LETTER
[Dear  ___] : Dear  [unfilled], [Courtesy Letter:] : I had the pleasure of seeing your patient, [unfilled], in my office today. [Please see my note below.] : Please see my note below. [Consult Closing:] : Thank you very much for allowing me to participate in the care of this patient.  If you have any questions, please do not hesitate to contact me. [Sincerely,] : Sincerely, [FreeTextEntry3] : Viraj Sanchez M.D., Ph.D. DPN-N\par [facility]\par Neurology at [place]\par Medical Director of Stroke Services\par [facility]\par

## 2021-04-06 ENCOUNTER — APPOINTMENT (OUTPATIENT)
Dept: INTERNAL MEDICINE | Facility: CLINIC | Age: 51
End: 2021-04-06
Payer: MEDICAID

## 2021-04-06 VITALS
RESPIRATION RATE: 12 BRPM | BODY MASS INDEX: 38.28 KG/M2 | SYSTOLIC BLOOD PRESSURE: 100 MMHG | HEIGHT: 62 IN | DIASTOLIC BLOOD PRESSURE: 72 MMHG | WEIGHT: 208 LBS | HEART RATE: 72 BPM

## 2021-04-06 PROCEDURE — 99072 ADDL SUPL MATRL&STAF TM PHE: CPT

## 2021-04-06 PROCEDURE — 99214 OFFICE O/P EST MOD 30 MIN: CPT

## 2021-04-06 NOTE — REVIEW OF SYSTEMS
[Joint Stiffness] : joint stiffness [Muscle Pain] : muscle pain [Back Pain] : back pain [Negative] : Heme/Lymph

## 2021-04-06 NOTE — PHYSICAL EXAM
[No Acute Distress] : no acute distress [Well Nourished] : well nourished [Well Developed] : well developed [Well-Appearing] : well-appearing [Normal Sclera/Conjunctiva] : normal sclera/conjunctiva [PERRL] : pupils equal round and reactive to light [EOMI] : extraocular movements intact [Normal Outer Ear/Nose] : the outer ears and nose were normal in appearance [Normal Oropharynx] : the oropharynx was normal [No JVD] : no jugular venous distention [No Lymphadenopathy] : no lymphadenopathy [Supple] : supple [Thyroid Normal, No Nodules] : the thyroid was normal and there were no nodules present [No Respiratory Distress] : no respiratory distress  [No Accessory Muscle Use] : no accessory muscle use [Clear to Auscultation] : lungs were clear to auscultation bilaterally [Normal Rate] : normal rate  [Regular Rhythm] : with a regular rhythm [Normal S1, S2] : normal S1 and S2 [No Murmur] : no murmur heard [No Carotid Bruits] : no carotid bruits [No Abdominal Bruit] : a ~M bruit was not heard ~T in the abdomen [No Varicosities] : no varicosities [Pedal Pulses Present] : the pedal pulses are present [No Edema] : there was no peripheral edema [No Palpable Aorta] : no palpable aorta [No Extremity Clubbing/Cyanosis] : no extremity clubbing/cyanosis [Soft] : abdomen soft [Non Tender] : non-tender [Non-distended] : non-distended [No Masses] : no abdominal mass palpated [No HSM] : no HSM [Normal Bowel Sounds] : normal bowel sounds [Normal Posterior Cervical Nodes] : no posterior cervical lymphadenopathy [Normal Anterior Cervical Nodes] : no anterior cervical lymphadenopathy [No CVA Tenderness] : no CVA  tenderness [No Spinal Tenderness] : no spinal tenderness [No Rash] : no rash [Coordination Grossly Intact] : coordination grossly intact [No Focal Deficits] : no focal deficits [Normal Gait] : normal gait [Deep Tendon Reflexes (DTR)] : deep tendon reflexes were 2+ and symmetric [Normal Affect] : the affect was normal [Normal Insight/Judgement] : insight and judgment were intact [de-identified] : mild distress [de-identified] : low back pain

## 2021-04-06 NOTE — ASSESSMENT
[FreeTextEntry1] : low back pain exacerbatin severe\par steroids tizanidine\par see Ortho-Spine for second opinion has not responded to other treatment mondaliites\par fibromyalgia stable\par incontinence of stool is problematic considering her issues with spine

## 2021-04-06 NOTE — HISTORY OF PRESENT ILLNESS
[Severe] : severe [Rest] : rest [Activity] : with activity [Worsening] : worsening [de-identified] : low back pain [FreeTextEntry8] : acute low back pain\par having pain in her back\par has history of herniated disc has had epidural in the past and sees Chiropractor\par hurts to move\par she does feel radiculopathy\par no fever no cough no sob\par no loss of stool and bladder function\par \par

## 2021-04-13 ENCOUNTER — OUTPATIENT (OUTPATIENT)
Dept: OUTPATIENT SERVICES | Facility: HOSPITAL | Age: 51
LOS: 1 days | End: 2021-04-13
Payer: MEDICAID

## 2021-04-13 ENCOUNTER — APPOINTMENT (OUTPATIENT)
Dept: ORTHOPEDIC SURGERY | Facility: CLINIC | Age: 51
End: 2021-04-13
Payer: MEDICAID

## 2021-04-13 VITALS
HEIGHT: 62 IN | SYSTOLIC BLOOD PRESSURE: 114 MMHG | HEART RATE: 77 BPM | DIASTOLIC BLOOD PRESSURE: 75 MMHG | BODY MASS INDEX: 38.28 KG/M2 | WEIGHT: 208 LBS | TEMPERATURE: 96.3 F

## 2021-04-13 DIAGNOSIS — Z98.89 OTHER SPECIFIED POSTPROCEDURAL STATES: Chronic | ICD-10-CM

## 2021-04-13 DIAGNOSIS — Z98.51 TUBAL LIGATION STATUS: Chronic | ICD-10-CM

## 2021-04-13 DIAGNOSIS — Z20.828 CONTACT WITH AND (SUSPECTED) EXPOSURE TO OTHER VIRAL COMMUNICABLE DISEASES: ICD-10-CM

## 2021-04-13 LAB — SARS-COV-2 RNA SPEC QL NAA+PROBE: SIGNIFICANT CHANGE UP

## 2021-04-13 PROCEDURE — 99213 OFFICE O/P EST LOW 20 MIN: CPT

## 2021-04-13 PROCEDURE — U0005: CPT

## 2021-04-13 PROCEDURE — U0003: CPT

## 2021-04-13 PROCEDURE — 73030 X-RAY EXAM OF SHOULDER: CPT | Mod: 50

## 2021-04-13 PROCEDURE — 99072 ADDL SUPL MATRL&STAF TM PHE: CPT

## 2021-04-13 NOTE — PHYSICAL EXAM
[de-identified] : General:\par Awake, alert, no acute distress, Patient was cooperative and appropriate during the examination.\par \par The patient is overweight for height and age.\par \par Walks without an antalgic gait.\par \par Full, painless range of motion of the neck and back.\par \par Exam of the bilateral lower extremities is intact and symmetric with regards to dermatologic, vascular, and neurologic exam. Bilateral lower extremity sensation is grossly intact to light touch in the DP/SP/T/S/S nerve distributions. Intact DF/PF/EHL. BIlateral lower extremities warm and well-perfused with brisk capillary refill.\par \par \par Pulmonary:\par Regular, nonlabored breathing\par \par Abdomen:\par Soft, nontender, nondistended.\par \par Lymphatic:\par No evidence of axillary lymphadenopathy\par \par Bilateral Shoulder Exam:\par Physical exam of the shoulders demonstrates normal skin without signs of skin changes or abnormalities. No erythema, warmth, or joint effusion appreciated.\par \par Sensation intact light touch C5-T1 bilaterally\par Palpable radial pulses\par Radial/ulnar/median/axillary/musculocutaneous/AIN/PIN nerves grossly intact\par \par Range of motion:\par Forward Flexion: 160 bilaterally secondary to pain\par Abduction: 100 bilaterally\par External Rotation: 45 bilaterally\par Internal Rotation: Back pocket on the right, L3 on the left\par \par Palpation:\par Globally tender to palpation about both shoulders, worse on the right\par \par Strength testing:\par Supraspinatus: 4/5 on the right, 5/5 on the left\par Infraspinatus: 5/5 bilaterally\par Subscapularis: 5/5 bilaterally\par \par Special test:\par Empty can test positive bilaterally\par Hernandez impingement test positive bilaterally\par Speeds test positive bilaterally\par Ancram's test negative bilaterally\par Lift-off test negative bilaterally\par Bell-press test negative bilaterally\par Cross-arm adduction test negative bilaterally [de-identified] : X-rays including 4 views of the bilateral shoulders were obtained in the office and reviewed the patient today.  There is no acute fracture or dislocation.  There is mild degenerative changes of the bilateral acromioclavicular joints.  Minimal arthritic changes in the glenohumeral joint.

## 2021-04-13 NOTE — DISCUSSION/SUMMARY
[de-identified] : Assessment: 51-year-old female with bilateral shoulder pain that is worse on the right\par \par Plan: I had a long discussion with the patient today regarding the nature of their diagnosis and treatment plan. We discussed the risks and benefits of no treatment as well as nonoperative and operative treatments.  I reviewed the patient's x-rays today with her in the office which are negative for any acute pathology.  The patient does have a history of fibromyalgia but has significant pain in the right shoulder with weakness to forward elevation in the scapular plane.  Given her history of a right shoulder arthroscopy with a possible rotator cuff repair I recommend a repeat MRI of the right shoulder at this time to evaluate for retear of the rotator cuff.  She may continue to treat her self symptomatically with modalities including resting, icing, anti-inflammatories as needed, activity modifications, and home exercises.  Recommend follow-up after the MRI to discuss results in person and any further recommendations.\par \par The patient verbalizes their understanding and agrees with the plan.  All questions were answered to their satisfaction.
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
## 2021-04-15 ENCOUNTER — OUTPATIENT (OUTPATIENT)
Dept: OUTPATIENT SERVICES | Facility: HOSPITAL | Age: 51
LOS: 1 days | End: 2021-04-15
Payer: MEDICAID

## 2021-04-15 DIAGNOSIS — Z98.51 TUBAL LIGATION STATUS: Chronic | ICD-10-CM

## 2021-04-15 DIAGNOSIS — Z98.89 OTHER SPECIFIED POSTPROCEDURAL STATES: Chronic | ICD-10-CM

## 2021-04-15 DIAGNOSIS — M54.16 RADICULOPATHY, LUMBAR REGION: ICD-10-CM

## 2021-04-15 PROCEDURE — 62323 NJX INTERLAMINAR LMBR/SAC: CPT

## 2021-04-20 ENCOUNTER — APPOINTMENT (OUTPATIENT)
Dept: ORTHOPEDIC SURGERY | Facility: CLINIC | Age: 51
End: 2021-04-20
Payer: MEDICAID

## 2021-04-20 VITALS
HEART RATE: 69 BPM | HEIGHT: 62 IN | WEIGHT: 208 LBS | DIASTOLIC BLOOD PRESSURE: 75 MMHG | SYSTOLIC BLOOD PRESSURE: 116 MMHG | BODY MASS INDEX: 38.28 KG/M2

## 2021-04-20 DIAGNOSIS — Z86.59 PERSONAL HISTORY OF OTHER MENTAL AND BEHAVIORAL DISORDERS: ICD-10-CM

## 2021-04-20 DIAGNOSIS — Z82.49 FAMILY HISTORY OF ISCHEMIC HEART DISEASE AND OTHER DISEASES OF THE CIRCULATORY SYSTEM: ICD-10-CM

## 2021-04-20 DIAGNOSIS — Z83.3 FAMILY HISTORY OF DIABETES MELLITUS: ICD-10-CM

## 2021-04-20 DIAGNOSIS — Z80.9 FAMILY HISTORY OF MALIGNANT NEOPLASM, UNSPECIFIED: ICD-10-CM

## 2021-04-20 PROCEDURE — 72100 X-RAY EXAM L-S SPINE 2/3 VWS: CPT

## 2021-04-20 PROCEDURE — 99214 OFFICE O/P EST MOD 30 MIN: CPT

## 2021-04-20 PROCEDURE — 99072 ADDL SUPL MATRL&STAF TM PHE: CPT

## 2021-04-20 NOTE — DISCUSSION/SUMMARY
[de-identified] : Patient has been referred to physical therapy for decreased pain modalities, core strengthening modalities, soft tissue modalities, and physical modalities. \par \par Prior to appointment and during encounter with patient extensive medical records were reviewed including but not limited to, hospital records, out patient records, imaging results, and lab data. During this appointment the patient was examined, diagnoses were discussed and explained in a face to face manner. In addition extensive time was spent reviewing aforementioned diagnostic studies. Counseling including abnormal image results, differential diagnoses, treatment options, risk and benefits, lifestyle changes, current condition, and current medications was performed. Patient's comments, questions, and concerns were address and patient verbalized understanding. Based on this patient's presentation at our office, which is an orthopedic spine surgeon's office, this patient inherently / intrinsically has a risk, however minute, of developing  issues such as Cauda equina syndrome, bowel and bladder changes, or progression of motor or neurological deficits such as paralysis which may be permanent.\par \par \par

## 2021-04-20 NOTE — PHYSICAL EXAM
[Obese] : obese [Poor Appearance] : well-appearing [Acute Distress] : not in acute distress [de-identified] : CONSTITUTIONAL: The patient is a very pleasant individual who is well-nourished and who appears stated age.\par PSYCHIATRIC: The patient is alert and oriented X 3 and in no apparent distress, and participates with orthopedic evaluation well.\par HEAD: Atraumatic and is nonsyndromic in appearance.\par EENT: No visible thyromegaly, EOMI.\par RESPIRATORY: Respiratory rate is regular, not dyspneic on examination.\par LYMPHATICS: There is no inguinal lymphadenopathy\par INTEGUMENTARY: Skin is clean, dry, and intact about the bilateral lower extremities and lumbar spine.\par VASCULAR: There is brisk capillary refill about the bilateral lower extremities.\par NEUROLOGIC: There are no pathologic reflexes. There is no decrease in sensation of the bilateral lower extremities on Wartenberg pinwheel examination. Deep tendon reflexes are well maintained at 2+/4 of the bilateral lower extremities and are symmetric.\par MUSCULOSKELETAL: There is no visible muscular atrophy. Manual motor strength is well maintained in the bilateral lower extremities. Range of motion of lumbar spine is well maintained. The patient ambulates in a non-myelopathic manner. Negative tension sign and straight leg raise bilaterally. Quad extension, ankle dorsiflexion, EHL, plantar flexion, and ankle eversion are well preserved. Normal secondary orthopaedic exam of bilateral hips, greater trochanteric area, knees and ankles.  [de-identified] : Xray of the lumbar spine taken 04/20/2021 demonstrates lumbar degenerative disc disease at L4-L5 and L5-S1\par \par Xray Images of the right shoulder taken 04- demonstrates no significant glenohumeral arthritis. \par \par X-rays including 4 views of the bilateral shoulders were obtained in the office and reviewed the patient today. There is no acute fracture or dislocation. There is mild degenerative changes of the bilateral acromioclavicular joints. Minimal arthritic changes in the glenohumeral joint.

## 2021-04-20 NOTE — ADDENDUM
[FreeTextEntry1] : Documented by Edson Malhotra acting as a scribe for Jeane Espinosa on 12/03/2020. All medical record entries made by the Scribe were at my, Jeane Espinosa, direction and personally dictated by me on 12/03/2020 . I have reviewed the chart and agree that the record accurately reflects my personal performance of the history, physical exam, assessment and plan. I have also personally directed, reviewed, and agreed with the chart.

## 2021-04-20 NOTE — HISTORY OF PRESENT ILLNESS
[Incontinence] : incontinence [de-identified] : 51 year old F presents for an initial evaluation of lower back pain more painful on the right, there is BL posterior LE pain that is also right worse than left. The leg pain is worse than the back pain. Exacerbating factors include bending and forward flexion of the lumbar spine. She has been to PT for this issue but states the pain became worse. She has recently been for her 6th epidural with Dr. Critsobal, she states the pain is improved but it is still present. Patient states she has been incontinent for 1+ year with loss of bowel control as well, this occurs around once a week.  [Ataxia] : no ataxia [Loss of Dexterity] : good dexterity [Urinary Ret.] : no urinary retention

## 2021-05-04 ENCOUNTER — OUTPATIENT (OUTPATIENT)
Dept: OUTPATIENT SERVICES | Facility: HOSPITAL | Age: 51
LOS: 1 days | End: 2021-05-04

## 2021-05-04 ENCOUNTER — APPOINTMENT (OUTPATIENT)
Dept: MRI IMAGING | Facility: CLINIC | Age: 51
End: 2021-05-04
Payer: MEDICAID

## 2021-05-04 DIAGNOSIS — Z98.89 OTHER SPECIFIED POSTPROCEDURAL STATES: Chronic | ICD-10-CM

## 2021-05-04 DIAGNOSIS — M25.511 PAIN IN RIGHT SHOULDER: ICD-10-CM

## 2021-05-04 DIAGNOSIS — Z98.51 TUBAL LIGATION STATUS: Chronic | ICD-10-CM

## 2021-05-04 PROCEDURE — 73221 MRI JOINT UPR EXTREM W/O DYE: CPT | Mod: 26,RT

## 2021-05-12 ENCOUNTER — RX RENEWAL (OUTPATIENT)
Age: 51
End: 2021-05-12

## 2021-06-18 ENCOUNTER — APPOINTMENT (OUTPATIENT)
Dept: RHEUMATOLOGY | Facility: CLINIC | Age: 51
End: 2021-06-18

## 2021-08-24 ENCOUNTER — NON-APPOINTMENT (OUTPATIENT)
Age: 51
End: 2021-08-24

## 2021-09-08 ENCOUNTER — APPOINTMENT (OUTPATIENT)
Dept: INTERNAL MEDICINE | Facility: CLINIC | Age: 51
End: 2021-09-08

## 2021-09-14 ENCOUNTER — APPOINTMENT (OUTPATIENT)
Dept: ORTHOPEDIC SURGERY | Facility: CLINIC | Age: 51
End: 2021-09-14

## 2021-09-17 ENCOUNTER — APPOINTMENT (OUTPATIENT)
Dept: RHEUMATOLOGY | Facility: CLINIC | Age: 51
End: 2021-09-17

## 2021-09-29 RX ORDER — ZOLPIDEM TARTRATE 10 MG/1
10 TABLET ORAL
Qty: 30 | Refills: 1 | Status: ACTIVE | COMMUNITY
Start: 2020-07-14 | End: 1900-01-01

## 2021-10-22 ENCOUNTER — APPOINTMENT (OUTPATIENT)
Dept: ORTHOPEDIC SURGERY | Facility: CLINIC | Age: 51
End: 2021-10-22
Payer: MEDICAID

## 2021-10-22 VITALS
HEIGHT: 62 IN | SYSTOLIC BLOOD PRESSURE: 122 MMHG | DIASTOLIC BLOOD PRESSURE: 74 MMHG | BODY MASS INDEX: 38.28 KG/M2 | WEIGHT: 208 LBS | HEART RATE: 77 BPM

## 2021-10-22 PROCEDURE — 99213 OFFICE O/P EST LOW 20 MIN: CPT

## 2021-10-22 NOTE — DISCUSSION/SUMMARY
[de-identified] : Assessment: 51-year-old female with bilateral shoulder pain that is worse on the right\par \par Plan: I had a long discussion with the patient today regarding the nature of their diagnosis and treatment plan. We discussed the risks and benefits of no treatment as well as nonoperative and operative treatments.  I reviewed the patient's MRI today with her in the office which demonstrates mild tendinopathy of the rotator cuff without any full-thickness tearing.  At this time I recommend conservative treatment of the patient's condition with modalities including rest, ice, heat, anti-inflammatory medications, activity modifications, and home stretching and strengthening exercises daily.  A prescription for meloxicam was sent to the patient's pharmacy today.  I discussed with the patient the risks and benefits associated with NSAID use.  A referral for physical therapy was provided to begin working on exercises for her shoulders to help improve her pain and function.  Recommend follow-up in 8 weeks for repeat clinical evaluation.  If she continues to have pain at the time we will consider a cortisone injection.\par \par The patient verbalizes their understanding and agrees with the plan.  All questions were answered to their satisfaction.
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
## 2021-10-22 NOTE — PHYSICAL EXAM
[de-identified] : General:\par Awake, alert, no acute distress, Patient was cooperative and appropriate during the examination.\par \par The patient is overweight for height and age.\par \par Walks without an antalgic gait.\par \par Full, painless range of motion of the neck and back.\par \par Exam of the bilateral lower extremities is intact and symmetric with regards to dermatologic, vascular, and neurologic exam. Bilateral lower extremity sensation is grossly intact to light touch in the DP/SP/T/S/S nerve distributions. Intact DF/PF/EHL. BIlateral lower extremities warm and well-perfused with brisk capillary refill.\par \par \par Pulmonary:\par Regular, nonlabored breathing\par \par Abdomen:\par Soft, nontender, nondistended.\par \par Lymphatic:\par No evidence of axillary lymphadenopathy\par \par Bilateral Shoulder Exam:\par Physical exam of the shoulders demonstrates normal skin without signs of skin changes or abnormalities. No erythema, warmth, or joint effusion appreciated.\par \par Sensation intact light touch C5-T1 bilaterally\par Palpable radial pulses\par Radial/ulnar/median/axillary/musculocutaneous/AIN/PIN nerves grossly intact\par \par Range of motion:\par Forward Flexion: 170 bilaterally secondary to pain\par Abduction: 100 bilaterally\par External Rotation: 45 bilaterally\par Internal Rotation: Back pocket on the right, L3 on the left\par \par Palpation:\par Globally tender to palpation about both shoulders, worse on the right\par \par Strength testing:\par Supraspinatus: 5/5 bilaterally\par Infraspinatus: 5/5 bilaterally\par Subscapularis: 5/5 bilaterally\par \par Special test:\par Empty can test positive bilaterally\par Hernandez impingement test positive bilaterally\par Speeds test positive bilaterally\par Sandoval's test negative bilaterally\par Lift-off test negative bilaterally\par Bell-press test negative bilaterally\par Cross-arm adduction test negative bilaterally [de-identified] : MRI of the right shoulder from 5/4/2021 and a [facility] imaging facility was reviewed the patient today in the office.  The patient has mild tendinosis/fraying of the anterior infraspinatus and posterior supraspinatus.  There is no discrete tear.  There is low-grade chondral thinning of the posterior glenoid.

## 2021-11-09 ENCOUNTER — RX RENEWAL (OUTPATIENT)
Age: 51
End: 2021-11-09

## 2021-11-30 ENCOUNTER — TRANSCRIPTION ENCOUNTER (OUTPATIENT)
Age: 51
End: 2021-11-30

## 2021-11-30 ENCOUNTER — APPOINTMENT (OUTPATIENT)
Dept: INTERNAL MEDICINE | Facility: CLINIC | Age: 51
End: 2021-11-30
Payer: MEDICAID

## 2021-11-30 VITALS
RESPIRATION RATE: 16 BRPM | WEIGHT: 216.13 LBS | TEMPERATURE: 97.7 F | DIASTOLIC BLOOD PRESSURE: 79 MMHG | SYSTOLIC BLOOD PRESSURE: 129 MMHG | BODY MASS INDEX: 39.53 KG/M2 | OXYGEN SATURATION: 99 % | HEART RATE: 91 BPM

## 2021-11-30 DIAGNOSIS — J06.9 ACUTE UPPER RESPIRATORY INFECTION, UNSPECIFIED: ICD-10-CM

## 2021-11-30 DIAGNOSIS — R09.81 NASAL CONGESTION: ICD-10-CM

## 2021-11-30 DIAGNOSIS — R05.9 COUGH, UNSPECIFIED: ICD-10-CM

## 2021-11-30 PROCEDURE — 99213 OFFICE O/P EST LOW 20 MIN: CPT

## 2021-11-30 NOTE — ASSESSMENT
[FreeTextEntry1] : Nasal congestion and cough likely viral in nature \par Cough syrup, intranasal glucocorticoid sent to pharm \par Tylenol  for body aches, rest and increase hydration \par Fu Dr. Conner if not improving after 4-5 days \par Fu viral panel

## 2021-11-30 NOTE — REVIEW OF SYSTEMS
[Chills] : chills [Nasal Discharge] : nasal discharge [Sore Throat] : sore throat [Postnasal Drip] : postnasal drip [Cough] : cough [Negative] : Neurological [Fever] : no fever [Fatigue] : no fatigue [Hot Flashes] : no hot flashes [Night Sweats] : no night sweats [Recent Change In Weight] : ~T no recent weight change [Discharge] : no discharge [Pain] : no pain [Redness] : no redness [Dryness] : no dryness  [Vision Problems] : no vision problems [Itching] : no itching [Earache] : no earache [Hearing Loss] : no hearing loss [Nosebleed] : no nosebleeds [Hoarseness] : no hoarseness [Shortness Of Breath] : no shortness of breath [Wheezing] : no wheezing [Dyspnea on Exertion] : no dyspnea on exertion

## 2021-11-30 NOTE — HISTORY OF PRESENT ILLNESS
[FreeTextEntry8] : 52 yo F c/o dry coughing, congestion, chills and body aches for the past three days. \par No fevers, denies trouble breathing. \par Denies sick contacts \par Works from home \par Patient is not COVID-19 or flu immune

## 2021-12-02 ENCOUNTER — NON-APPOINTMENT (OUTPATIENT)
Age: 51
End: 2021-12-02

## 2021-12-02 LAB
RAPID RVP RESULT: NOT DETECTED
SARS-COV-2 RNA PNL RESP NAA+PROBE: NOT DETECTED

## 2021-12-30 ENCOUNTER — OUTPATIENT (OUTPATIENT)
Dept: OUTPATIENT SERVICES | Facility: HOSPITAL | Age: 51
LOS: 1 days | End: 2021-12-30
Payer: MEDICAID

## 2021-12-30 DIAGNOSIS — Z98.51 TUBAL LIGATION STATUS: Chronic | ICD-10-CM

## 2021-12-30 DIAGNOSIS — Z98.89 OTHER SPECIFIED POSTPROCEDURAL STATES: Chronic | ICD-10-CM

## 2021-12-30 DIAGNOSIS — Z20.828 CONTACT WITH AND (SUSPECTED) EXPOSURE TO OTHER VIRAL COMMUNICABLE DISEASES: ICD-10-CM

## 2021-12-30 LAB — SARS-COV-2 RNA SPEC QL NAA+PROBE: DETECTED

## 2021-12-30 PROCEDURE — U0003: CPT

## 2021-12-30 PROCEDURE — U0005: CPT

## 2022-01-03 ENCOUNTER — APPOINTMENT (OUTPATIENT)
Dept: INTERNAL MEDICINE | Facility: CLINIC | Age: 52
End: 2022-01-03

## 2022-01-07 ENCOUNTER — APPOINTMENT (OUTPATIENT)
Dept: ORTHOPEDIC SURGERY | Facility: CLINIC | Age: 52
End: 2022-01-07

## 2022-01-27 RX ORDER — MECLIZINE HYDROCHLORIDE 25 MG/1
25 TABLET ORAL 3 TIMES DAILY
Qty: 45 | Refills: 1 | Status: COMPLETED | COMMUNITY
Start: 2022-01-27 | End: 2022-02-26

## 2022-02-07 ENCOUNTER — EMERGENCY (EMERGENCY)
Facility: HOSPITAL | Age: 52
LOS: 1 days | Discharge: HOME | End: 2022-02-07
Attending: STUDENT IN AN ORGANIZED HEALTH CARE EDUCATION/TRAINING PROGRAM
Payer: MEDICAID

## 2022-02-07 VITALS
RESPIRATION RATE: 18 BRPM | DIASTOLIC BLOOD PRESSURE: 80 MMHG | OXYGEN SATURATION: 99 % | HEART RATE: 89 BPM | SYSTOLIC BLOOD PRESSURE: 132 MMHG

## 2022-02-07 VITALS
HEART RATE: 102 BPM | WEIGHT: 210.1 LBS | SYSTOLIC BLOOD PRESSURE: 111 MMHG | RESPIRATION RATE: 17 BRPM | TEMPERATURE: 98 F | OXYGEN SATURATION: 99 % | HEIGHT: 65 IN | DIASTOLIC BLOOD PRESSURE: 74 MMHG

## 2022-02-07 DIAGNOSIS — Z98.89 OTHER SPECIFIED POSTPROCEDURAL STATES: Chronic | ICD-10-CM

## 2022-02-07 DIAGNOSIS — Z98.51 TUBAL LIGATION STATUS: Chronic | ICD-10-CM

## 2022-02-07 LAB
ANION GAP SERPL CALC-SCNC: 12 MMOL/L — SIGNIFICANT CHANGE UP (ref 5–17)
BASOPHILS # BLD AUTO: 0.04 K/UL — SIGNIFICANT CHANGE UP (ref 0–0.2)
BASOPHILS NFR BLD AUTO: 0.7 % — SIGNIFICANT CHANGE UP (ref 0–2)
BUN SERPL-MCNC: 10.6 MG/DL — SIGNIFICANT CHANGE UP (ref 8–20)
CALCIUM SERPL-MCNC: 9.5 MG/DL — SIGNIFICANT CHANGE UP (ref 8.6–10.2)
CHLORIDE SERPL-SCNC: 105 MMOL/L — SIGNIFICANT CHANGE UP (ref 98–107)
CO2 SERPL-SCNC: 23 MMOL/L — SIGNIFICANT CHANGE UP (ref 22–29)
CREAT SERPL-MCNC: 0.77 MG/DL — SIGNIFICANT CHANGE UP (ref 0.5–1.3)
EOSINOPHIL # BLD AUTO: 0.01 K/UL — SIGNIFICANT CHANGE UP (ref 0–0.5)
EOSINOPHIL NFR BLD AUTO: 0.2 % — SIGNIFICANT CHANGE UP (ref 0–6)
GLUCOSE SERPL-MCNC: 110 MG/DL — HIGH (ref 70–99)
HCT VFR BLD CALC: 39.7 % — SIGNIFICANT CHANGE UP (ref 34.5–45)
HGB BLD-MCNC: 12.2 G/DL — SIGNIFICANT CHANGE UP (ref 11.5–15.5)
IMM GRANULOCYTES NFR BLD AUTO: 0.3 % — SIGNIFICANT CHANGE UP (ref 0–1.5)
LYMPHOCYTES # BLD AUTO: 2.12 K/UL — SIGNIFICANT CHANGE UP (ref 1–3.3)
LYMPHOCYTES # BLD AUTO: 35.6 % — SIGNIFICANT CHANGE UP (ref 13–44)
MCHC RBC-ENTMCNC: 24.6 PG — LOW (ref 27–34)
MCHC RBC-ENTMCNC: 30.7 GM/DL — LOW (ref 32–36)
MCV RBC AUTO: 80.2 FL — SIGNIFICANT CHANGE UP (ref 80–100)
MONOCYTES # BLD AUTO: 0.67 K/UL — SIGNIFICANT CHANGE UP (ref 0–0.9)
MONOCYTES NFR BLD AUTO: 11.2 % — SIGNIFICANT CHANGE UP (ref 2–14)
NEUTROPHILS # BLD AUTO: 3.1 K/UL — SIGNIFICANT CHANGE UP (ref 1.8–7.4)
NEUTROPHILS NFR BLD AUTO: 52 % — SIGNIFICANT CHANGE UP (ref 43–77)
PLATELET # BLD AUTO: 417 K/UL — HIGH (ref 150–400)
POTASSIUM SERPL-MCNC: 4.1 MMOL/L — SIGNIFICANT CHANGE UP (ref 3.5–5.3)
POTASSIUM SERPL-SCNC: 4.1 MMOL/L — SIGNIFICANT CHANGE UP (ref 3.5–5.3)
RBC # BLD: 4.95 M/UL — SIGNIFICANT CHANGE UP (ref 3.8–5.2)
RBC # FLD: 17.1 % — HIGH (ref 10.3–14.5)
SODIUM SERPL-SCNC: 140 MMOL/L — SIGNIFICANT CHANGE UP (ref 135–145)
WBC # BLD: 5.96 K/UL — SIGNIFICANT CHANGE UP (ref 3.8–10.5)
WBC # FLD AUTO: 5.96 K/UL — SIGNIFICANT CHANGE UP (ref 3.8–10.5)

## 2022-02-07 PROCEDURE — 85025 COMPLETE CBC W/AUTO DIFF WBC: CPT

## 2022-02-07 PROCEDURE — 99284 EMERGENCY DEPT VISIT MOD MDM: CPT | Mod: 25

## 2022-02-07 PROCEDURE — 73030 X-RAY EXAM OF SHOULDER: CPT | Mod: 26,RT

## 2022-02-07 PROCEDURE — 73030 X-RAY EXAM OF SHOULDER: CPT

## 2022-02-07 PROCEDURE — 36415 COLL VENOUS BLD VENIPUNCTURE: CPT

## 2022-02-07 PROCEDURE — 99284 EMERGENCY DEPT VISIT MOD MDM: CPT

## 2022-02-07 PROCEDURE — 96374 THER/PROPH/DIAG INJ IV PUSH: CPT

## 2022-02-07 PROCEDURE — 96375 TX/PRO/DX INJ NEW DRUG ADDON: CPT

## 2022-02-07 PROCEDURE — 80048 BASIC METABOLIC PNL TOTAL CA: CPT

## 2022-02-07 RX ORDER — DIPHENHYDRAMINE HCL 50 MG
25 CAPSULE ORAL ONCE
Refills: 0 | Status: COMPLETED | OUTPATIENT
Start: 2022-02-07 | End: 2022-02-07

## 2022-02-07 RX ORDER — DEXAMETHASONE 0.5 MG/5ML
10 ELIXIR ORAL ONCE
Refills: 0 | Status: COMPLETED | OUTPATIENT
Start: 2022-02-07 | End: 2022-02-07

## 2022-02-07 RX ORDER — KETOROLAC TROMETHAMINE 30 MG/ML
15 SYRINGE (ML) INJECTION ONCE
Refills: 0 | Status: DISCONTINUED | OUTPATIENT
Start: 2022-02-07 | End: 2022-02-07

## 2022-02-07 RX ORDER — ACETAMINOPHEN 500 MG
650 TABLET ORAL ONCE
Refills: 0 | Status: COMPLETED | OUTPATIENT
Start: 2022-02-07 | End: 2022-02-07

## 2022-02-07 RX ORDER — SODIUM CHLORIDE 9 MG/ML
1000 INJECTION INTRAMUSCULAR; INTRAVENOUS; SUBCUTANEOUS ONCE
Refills: 0 | Status: COMPLETED | OUTPATIENT
Start: 2022-02-07 | End: 2022-02-07

## 2022-02-07 RX ORDER — MAGNESIUM SULFATE 500 MG/ML
2 VIAL (ML) INJECTION ONCE
Refills: 0 | Status: COMPLETED | OUTPATIENT
Start: 2022-02-07 | End: 2022-02-07

## 2022-02-07 RX ORDER — METOCLOPRAMIDE HCL 10 MG
10 TABLET ORAL ONCE
Refills: 0 | Status: COMPLETED | OUTPATIENT
Start: 2022-02-07 | End: 2022-02-07

## 2022-02-07 RX ORDER — METOCLOPRAMIDE HCL 10 MG
10 TABLET ORAL ONCE
Refills: 0 | Status: DISCONTINUED | OUTPATIENT
Start: 2022-02-07 | End: 2022-02-07

## 2022-02-07 RX ADMIN — Medication 15 MILLIGRAM(S): at 12:50

## 2022-02-07 RX ADMIN — Medication 650 MILLIGRAM(S): at 12:49

## 2022-02-07 RX ADMIN — Medication 104 MILLIGRAM(S): at 12:52

## 2022-02-07 RX ADMIN — Medication 25 MILLIGRAM(S): at 17:50

## 2022-02-07 RX ADMIN — Medication 650 MILLIGRAM(S): at 15:33

## 2022-02-07 RX ADMIN — Medication 102 MILLIGRAM(S): at 15:41

## 2022-02-07 RX ADMIN — Medication 150 GRAM(S): at 15:44

## 2022-02-07 RX ADMIN — SODIUM CHLORIDE 1000 MILLILITER(S): 9 INJECTION INTRAMUSCULAR; INTRAVENOUS; SUBCUTANEOUS at 12:53

## 2022-02-07 RX ADMIN — Medication 15 MILLIGRAM(S): at 15:33

## 2022-02-07 NOTE — ED PROVIDER NOTE - NSICDXPASTSURGICALHX_GEN_ALL_CORE_FT
PAST SURGICAL HISTORY:  S/P appendectomy     S/P  section 1993    S/P rotator cuff repair Right shoulder ()    S/P thoracentesis     S/P tonsillectomy     S/P tubal ligation 10/1995

## 2022-02-07 NOTE — ED PROVIDER NOTE - PROGRESS NOTE DETAILS
PT reports relief of headache. she will follow up with her neurologist. ED return precautions discussed.

## 2022-02-07 NOTE — ED PROVIDER NOTE - OBJECTIVE STATEMENT
50 y/o female h/o migraines followed by neurology presents c/o left sided headache with associated nausea and photophobia. PT reports HA is the same as her previous ones however it has not improved with her immitrex. PT also reports a episode of dizziness yesterday. She has been treated for vertigo in the past with meclozine. Denies any dizziness now. + h/o anemia as well

## 2022-02-07 NOTE — ED ADULT NURSE NOTE - OBJECTIVE STATEMENT
Pt Aox4. Pt reports having a severe migraine that started 3am Sunday and has not subsided. Pt reports PMHx of migraines in which they usually resolve unlike the one she currently is having. Denies SOB and chest pain. pt reports nausea and vomiting with no blood present. PMHx of anemia, fibromyalgia and hysterectomy. pt educated on plan of care, pt able to successfully teach back plan of care to RN, RN will continue to reeducate pt during hospital stay.

## 2022-02-07 NOTE — ED ADULT TRIAGE NOTE - WEIGHT IN LBS
NO call back from [facility]o yet, Minerva from [facility] to call Ramo for update at this time   [phone]

## 2022-02-07 NOTE — ED PROVIDER NOTE - NSICDXFAMILYHX_GEN_ALL_CORE_FT
FAMILY HISTORY:  Father  Still living? No  Family history of pancreatic cancer, Age at diagnosis: 71-80  Family history of pancreatic cancer, Age at diagnosis: 71-80    Mother  Still living? Yes, Estimated age: 71-80  Diabetes mellitus, Age at diagnosis: 71-80  Family history of pacemaker, Age at diagnosis: Age Unknown

## 2022-02-07 NOTE — ED PROVIDER NOTE - CLINICAL SUMMARY MEDICAL DECISION MAKING FREE TEXT BOX
52 y/o female h/o migraines presents with headache typically of her previous migraines  will treat pain and re-eval, will obtain cbc due to h/o anemia and episode of dizziness

## 2022-02-07 NOTE — ED PROVIDER NOTE - NSICDXPASTMEDICALHX_GEN_ALL_CORE_FT
PAST MEDICAL HISTORY:  Anemia     Carpal tunnel syndrome, bilateral     Depression     Migraine     Vertigo

## 2022-02-07 NOTE — ED PROVIDER NOTE - ATTENDING CONTRIBUTION TO CARE
I have personally performed a face to face medical and diagnostic evaluation of the patient. I have discussed with and reviewed the ACP's note and agree with the History, ROS, Physical Exam and MDM unless otherwise indicated. A brief summary of my personal evaluation and impression can be found below.    50yo woman PMH migraines presents with headache, nausea, photophobia x 2 days. She also had a resolved episode of dizziness. PT states that it feels similar to previous migraines but was concerned because it was lasting longer than usual and did not improve after home use of imitrex. Denies sudden onset HA, focal weakness or numbness in arms or legs, change in vision, slurred speech, fevers or chills, neck pain, new rash. No trauma.    On exam, initial vitals were reviewed.  Pt is well-appearing in no acute distress. NC/AT, PERRL, EOMI, MMM, supple neck, full ROM, RRR, pulses 2+ in all extremities, lungs CTABL, abdomen soft, nontender, nondistended, no obvious joint deformities, pt is awake and alert, CN III-XII intact, 5/5 strength in all extremities, no rash noted.     Low suspicion for SAH or meningitis based upon history/physical. Most likely migraine. Checked for electrolyte abnormalities, anemia, given pain medication and fluids. Pt had improvement of symptoms and was discharged after discussion of return precautions and instructed to f/u with outpt neurologist.

## 2022-02-07 NOTE — ED PROVIDER NOTE - CARE PROVIDER_API CALL
JoseE Buck (DO)  Orthopaedic Surgery  [address] [place] [address], [address]  [place] [address]  Phone: [phone]  Fax: [phone]  Follow Up Time:

## 2022-02-07 NOTE — ED PROVIDER NOTE - NSFOLLOWUPINSTRUCTIONS_ED_ALL_ED_FT
Headache    A headache is pain or discomfort felt around the head or neck area. The specific cause of a headache may not be found as there are many types including tension headaches, migraine headaches, and cluster headaches. Watch your condition for any changes. Things you can do to manage your pain include taking over the counter and prescription medications as instructed by your health care provider, lying down in a dark quiet room, limiting stress, getting regular sleep, and refraining from alcohol and tobacco products.    SEEK IMMEDIATE MEDICAL CARE IF YOU HAVE ANY OF THE FOLLOWING SYMPTOMS: fever, vomiting, stiff neck, loss of vision, problems with speech, muscle weakness, loss of balance, trouble walking, passing out, or confusion.     Please follow up with your neurologist within 4 days  Please follow up with orthopedics within one week

## 2022-02-07 NOTE — ED PROVIDER NOTE - WR ORDER ID 1
Kim Ball needs refill of   Requested Prescriptions     Pending Prescriptions Disp Refills    levothyroxine (SYNTHROID) 50 MCG tablet 30 tablet 1     Sig: Take 1 tablet by mouth Daily       Last Filled on:  11/3/2017    Last Visit Date:  11/2/2017    Next Visit Date:    Visit date not found    Pt is due for annual appointment. Nex[employer] message sent. [id number]

## 2022-02-07 NOTE — ED PROVIDER NOTE - PATIENT PORTAL LINK FT
You can access the FollowMyHealth Patient Portal offered by [facility] by registering at the following website: [email or web address]. By joining [facility]’s FollowMyHealth portal, you will also be able to view your health information using other applications (apps) compatible with our system.

## 2022-02-10 ENCOUNTER — NON-APPOINTMENT (OUTPATIENT)
Age: 52
End: 2022-02-10

## 2022-03-18 ENCOUNTER — OUTPATIENT (OUTPATIENT)
Dept: OUTPATIENT SERVICES | Facility: HOSPITAL | Age: 52
LOS: 1 days | End: 2022-03-18
Payer: MEDICAID

## 2022-03-18 DIAGNOSIS — Z98.89 OTHER SPECIFIED POSTPROCEDURAL STATES: Chronic | ICD-10-CM

## 2022-03-18 DIAGNOSIS — Z20.828 CONTACT WITH AND (SUSPECTED) EXPOSURE TO OTHER VIRAL COMMUNICABLE DISEASES: ICD-10-CM

## 2022-03-18 DIAGNOSIS — Z98.51 TUBAL LIGATION STATUS: Chronic | ICD-10-CM

## 2022-03-18 LAB — SARS-COV-2 RNA SPEC QL NAA+PROBE: SIGNIFICANT CHANGE UP

## 2022-03-18 PROCEDURE — U0005: CPT

## 2022-03-18 PROCEDURE — U0003: CPT

## 2022-03-21 ENCOUNTER — OUTPATIENT (OUTPATIENT)
Dept: OUTPATIENT SERVICES | Facility: HOSPITAL | Age: 52
LOS: 1 days | End: 2022-03-21
Payer: MEDICARE

## 2022-03-21 DIAGNOSIS — M54.16 RADICULOPATHY, LUMBAR REGION: ICD-10-CM

## 2022-03-21 DIAGNOSIS — Z98.89 OTHER SPECIFIED POSTPROCEDURAL STATES: Chronic | ICD-10-CM

## 2022-03-21 DIAGNOSIS — Z98.51 TUBAL LIGATION STATUS: Chronic | ICD-10-CM

## 2022-03-21 PROCEDURE — 62323 NJX INTERLAMINAR LMBR/SAC: CPT

## 2022-03-27 ENCOUNTER — RX RENEWAL (OUTPATIENT)
Age: 52
End: 2022-03-27

## 2022-03-31 ENCOUNTER — APPOINTMENT (OUTPATIENT)
Dept: RHEUMATOLOGY | Facility: CLINIC | Age: 52
End: 2022-03-31
Payer: MEDICARE

## 2022-03-31 VITALS
DIASTOLIC BLOOD PRESSURE: 60 MMHG | OXYGEN SATURATION: 98 % | WEIGHT: 199 LBS | TEMPERATURE: 97.1 F | SYSTOLIC BLOOD PRESSURE: 100 MMHG | BODY MASS INDEX: 36.62 KG/M2 | HEIGHT: 62 IN | HEART RATE: 99 BPM

## 2022-03-31 DIAGNOSIS — R15.9 FULL INCONTINENCE OF FECES: ICD-10-CM

## 2022-03-31 DIAGNOSIS — R42 DIZZINESS AND GIDDINESS: ICD-10-CM

## 2022-03-31 PROCEDURE — 99215 OFFICE O/P EST HI 40 MIN: CPT

## 2022-03-31 RX ORDER — MELOXICAM 15 MG/1
15 TABLET ORAL
Qty: 21 | Refills: 0 | Status: DISCONTINUED | COMMUNITY
Start: 2021-10-22 | End: 2022-03-31

## 2022-03-31 RX ORDER — BROMPHENIRAMINE MALEATE, PSEUDOEPHEDRINE HYDROCHLORIDE, 2; 30; 10 MG/5ML; MG/5ML; MG/5ML
30-2-10 SYRUP ORAL
Qty: 210 | Refills: 0 | Status: DISCONTINUED | COMMUNITY
Start: 2021-11-30 | End: 2022-03-31

## 2022-03-31 RX ORDER — FLUTICASONE PROPIONATE 50 UG/1
50 SPRAY, METERED NASAL DAILY
Qty: 1 | Refills: 0 | Status: DISCONTINUED | COMMUNITY
Start: 2021-11-30 | End: 2022-03-31

## 2022-03-31 RX ORDER — TIZANIDINE 4 MG/1
4 TABLET ORAL
Qty: 30 | Refills: 2 | Status: DISCONTINUED | COMMUNITY
Start: 2021-04-06 | End: 2022-03-31

## 2022-03-31 RX ORDER — METHYLPREDNISOLONE 4 MG/1
4 TABLET ORAL
Qty: 21 | Refills: 1 | Status: DISCONTINUED | COMMUNITY
Start: 2021-03-11 | End: 2022-03-31

## 2022-03-31 RX ORDER — METHYLPREDNISOLONE 4 MG/1
4 TABLET ORAL
Qty: 1 | Refills: 2 | Status: DISCONTINUED | COMMUNITY
Start: 2021-04-06 | End: 2022-03-31
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
## 2022-03-31 NOTE — HISTORY OF PRESENT ILLNESS
[FreeTextEntry1] : 03/31/2022\par - Hx of Peripheral Neuropathy: Hasn't taken Gabapentin "for a while" (estimated since 11/2021) - thinks that it helped - peripheral neuropathy reported chronic (no hx of Diabetes, no recent HgbA1c, last saw PCP 02/2022) neuropathy in the tips of fingers and feet\par - Taking Ambien previously every night but stopped since 11/2021, ran out of refills. Reports avg sleep of 4 hrs without Ambien, but with Ambien reports 9-10 hrs. \par - Hx of Vertigo - dizziness and near syncope recurrent since she was in the 4th grade - was prescribed meclizine in the past without relief and stopped taking. MRI brain previously received multiple, all nl. \par - Hx of HA: Recent ED visit in 02/2022 related to HA - was given "multiple shots" including Diphenhydramine, which provided relief. Taking Topamax 50 mg BID, Sumatriptan 100 mg abortive therapy, Cyclobenzaprine 10 mg with some relief...\par - Recent significant stressor: moving out forcibly w/ [employer] - also going through disability application, and feels immense stress. Works from home as a  ... feels that she can't sit or stand for long periods of time. \par - FM/Anxiety/Depression: taking trazodone 100 mg  daily hs and duloxetine 40 mg daily and bupropion 75 mg daily - sees therapist once weekly and psychiatrist every 3 months.  \par - Chronic pain: shoulders, R wrist (residual from fracture in 2020), knees - "generally all over", known lumbar radiculopathy\par - Epidurals once a year with PM - recurrent episodes (1X monthly since 2020) of bowel incontinence 2.5 weeks ago... reports that neurosurgeon states she doesn't need surgery, just management with epidural ??  Also reports urinary incontinence without notice. Denies saddle anesthesia. \par - Exercise: wants breast reduction because she feels that they interfere with working out, but unable get reduction until weight loss?? \par \par \par 1) FM\par 2) Dizziness \par \par 6/2/20 Initial HPI\par 49 yo AAF w/ hx Migraines followiing w/ Dr. Sanchez.. started on Topamax... and dx x many ys FM.. Current symptoms:  \par Diffuse arthralgias/ myalgias "everywhere" w/ no overt joint swelling. Pain varies.. fairly controlled, pain worse as day progresses\par Migraines as noted improved lately w/ topamax \par BRENDAN works w/ psychologist on trazodone, duloxetine 40 mg daily and 75 mg wellbutrin... and psychotherapy..\par Sleep:  requires trazodone w/ + response, can't sleep without. Trouble falling/ staying asleep even with 200 mg trazodone and 50 mg topiramate... \par No formal exercise "was told not to... and got in trouble when tried" \par NOTE:\par - Seroquel 300 mg in past- off past yr stopped for logistical reasons... \par - gabapentin 600 mg TID w/ + response, stopped when provider left\par - Xanax poor response.. too dizzy and fell "into a bathtub"\par - Ambien excellent respones helped with sleep \par \par \par 2) Recent fall:  does have hx vertigo.. but can't remember why fell.  \par R wrist fracture... w/ recent fall- ? etiology of fall unknown, w/u in progress\par Dizziness persists.. can't afford balance tx... not NEW \par \par 3) Anemia:  s/p ADRIA 2015 w/ improvement since then..\par \par 4) Obesity:  s/p gastric sleeve 6/2018 total 75 lb ... now 187.. stable in past 3 m\par \par Labs 5/28/20 low T4 0.4 w/ nl TSH 2, Vit D 27 nl CBC w/ plt 480 , CMP, folate/ B12, UA, Hba1c 5.5 Covid Ab neg
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
## 2022-04-11 PROBLEM — Z11.59 SCREENING FOR VIRAL DISEASE: Status: ACTIVE | Noted: 2020-05-27

## 2022-04-22 NOTE — DATA REVIEWED
Jg Marie RN
[No studies available for review at this time.] : No studies available for review at this time.

## 2022-05-03 ENCOUNTER — OUTPATIENT (OUTPATIENT)
Dept: OUTPATIENT SERVICES | Facility: HOSPITAL | Age: 52
LOS: 1 days | End: 2022-05-03
Payer: MEDICARE

## 2022-05-03 DIAGNOSIS — Z98.51 TUBAL LIGATION STATUS: Chronic | ICD-10-CM

## 2022-05-03 DIAGNOSIS — Z98.89 OTHER SPECIFIED POSTPROCEDURAL STATES: Chronic | ICD-10-CM

## 2022-05-03 DIAGNOSIS — Z20.828 CONTACT WITH AND (SUSPECTED) EXPOSURE TO OTHER VIRAL COMMUNICABLE DISEASES: ICD-10-CM

## 2022-05-03 LAB — SARS-COV-2 RNA SPEC QL NAA+PROBE: SIGNIFICANT CHANGE UP

## 2022-05-03 PROCEDURE — U0005: CPT

## 2022-05-03 PROCEDURE — U0003: CPT

## 2022-05-05 ENCOUNTER — OUTPATIENT (OUTPATIENT)
Dept: OUTPATIENT SERVICES | Facility: HOSPITAL | Age: 52
LOS: 1 days | End: 2022-05-05
Payer: MEDICARE

## 2022-05-05 DIAGNOSIS — Z98.89 OTHER SPECIFIED POSTPROCEDURAL STATES: Chronic | ICD-10-CM

## 2022-05-05 DIAGNOSIS — Z98.51 TUBAL LIGATION STATUS: Chronic | ICD-10-CM

## 2022-05-05 DIAGNOSIS — M54.16 RADICULOPATHY, LUMBAR REGION: ICD-10-CM

## 2022-05-05 PROCEDURE — 62323 NJX INTERLAMINAR LMBR/SAC: CPT

## 2022-07-14 ENCOUNTER — APPOINTMENT (OUTPATIENT)
Dept: RHEUMATOLOGY | Facility: CLINIC | Age: 52
End: 2022-07-14
Payer: MEDICARE

## 2022-07-14 PROCEDURE — 99212 OFFICE O/P EST SF 10 MIN: CPT | Mod: 95

## 2022-07-14 NOTE — PHYSICAL EXAM
[General Appearance - Alert] : alert [General Appearance - In No Acute Distress] : in no acute distress [General Appearance - Well Nourished] : well nourished [General Appearance - Well Developed] : well developed [General Appearance - Well-Appearing] : healthy appearing [Abnormal Walk] : normal gait [Nail Clubbing] : no clubbing  or cyanosis of the fingernails [Involuntary Movements] : no involuntary movements were seen [Musculoskeletal - Swelling] : no joint swelling seen [Motor Tone] : muscle strength and tone were normal [Skin Color & Pigmentation] : normal skin color and pigmentation [Skin Turgor] : normal skin turgor [] : no rash [Skin Lesions] : no skin lesions [No Focal Deficits] : no focal deficits [Oriented To Time, Place, And Person] : oriented to person, place, and time [FreeTextEntry1] : better now with stable home.. no longer tearful..

## 2022-07-14 NOTE — HISTORY OF PRESENT ILLNESS
[FreeTextEntry1] : Verbal consent given on 07/14/2022 at 19:04 by PHOENIX HOOD, [].\par Amwell\par \par - overall doing better now social situation has improved and has helped overall well being\par - has started ambien from psych w/ + response\par - back issue active again..\par - still hasn't found someone for breast reductoin sx.  \par \par \par 1) FM\par 2) Chronic LBP with lumbar radiculopathy \par 3) Dizziness \par ____________________________________________________________________________\par 6/2/20 Initial HPI\par 49 yo AAF w/ hx Migraines followiing w/ Dr. Sanchez.. started on Topamax... and dx x many ys FM.. Current symptoms:  \par Diffuse arthralgias/ myalgias "everywhere" w/ no overt joint swelling. Pain varies.. fairly controlled, pain worse as day progresses\par Migraines as noted improved lately w/ topamax \par BRENDAN works w/ psychologist on trazodone, duloxetine 40 mg daily and 75 mg wellbutrin... and psychotherapy..\par Sleep:  requires trazodone w/ + response, can't sleep without. Trouble falling/ staying asleep even with 200 mg trazodone and 50 mg topiramate... \par No formal exercise "was told not to... and got in trouble when tried" \par NOTE:\par - Seroquel 300 mg in past- off past yr stopped for logistical reasons... \par - gabapentin 600 mg TID w/ + response, stopped when provider left\par - Xanax poor response.. too dizzy and fell "into a bathtub"\par - Ambien excellent respones helped with sleep \par \par \par 2) Recent fall:  does have hx vertigo.. but can't remember why fell.  \par R wrist fracture... w/ recent fall- ? etiology of fall unknown, w/u in progress\par Dizziness persists.. can't afford balance tx... not NEW \par \par 3) Anemia:  s/p ADRIA 2015 w/ improvement since then..\par \par 4) Obesity:  s/p gastric sleeve 6/2018 total 75 lb ... now 187.. stable in past 3 m\par \par Labs 5/28/20 low T4 0.4 w/ nl TSH 2, Vit D 27 nl CBC w/ plt 480 , CMP, folate/ B12, UA, Hba1c 5.5 Covid Ab neg
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
## 2022-07-14 NOTE — ASSESSMENT
[FreeTextEntry1] : 52 yo AAF w/ hx Migraines, Vertigo, Obesity, and long standing dx FM, with overall poor insight on personal health, poor compliance and lack of regular follow-up with multiple providers. \par \par 1) FM (likely secondary to BRENDAN w/ Depression x many ys): Taking trazodone 100 mg  daily hs and duloxetine 40 mg daily and bupropion 75 mg daily - sees therapist once weekly and psychiatrist every 3 months. Fairly well controlled Depression/Anxiety.  Greatly improved with control of recent stressors, will be staying in home and living with parents.  Also restarted gabapentin with + response.   \par - Migraines, intermittent with recent ED visit, taking Topamax 50 mg BID, Sumatriptan 100 mg abortive therapy, multiple MRI brains have been nl in the past.\par - Sleep: required Ambien in the past with + response, on Trazadone 100 mg with only 4-5 hrs of sleep without Ambien. Patient informed that Ambien is not the best agent for sleep, and should be coordinated with her psychiatrist. Possibly increase Trazodone to 200 mg ? In the meantime, can try cyclobenzaprine 10 mg hs to see if this works. \par - Exercise: has been told by some clinician in the past that she shouldn't? would like breast reduction to make it easier to exercise but ineligible for breast reduction until weight loss?  Should probably start PT\par NOTE:\par - Seroquel 300 mg in past- off past yr stopped for logistical reasons... \par - gabapentin 600 mg TID w/ + response, stopped when provider left\par - Xanax poor response.. too dizzy and fell "into a bathtub"\par - Ambien excellent response helped with sleep\par Labs 5/28/20 low T4 0.4 w/ nl TSH 2, Vit D 27 nl CBC w/ plt 480 , CMP, folate/ B12, UA, Hba1c 5.5 Covid Ab neg \par \par 2) Dizziness:  dx w/ Vertigo from young age, was doing dedicated PT but stopped w/ pandemic. Has tried meclizine in the past with no response... MRI brain nl in the past. \par NOTE: \par  Anemia:  s/p ADRIA 2015 w/ improvement since then..\par \par 4) Obesity:  s/p gastric sleeve 6/2018 went down to 178 lbs... now increased to 199 lbs... not willing to consider exercise regimen at this time, see above.\par Borderline HgbA1c 5.7  in the past 2020 - will repeat today\par \par 5) Hypothyroidism?:  nl TSH but T 4 low in 2020... will repeat today.\par \par 6) Lumbar radiculopathy w/ bowel incontinence:   recurrent episodes (1X monthly since 2020) of bowel incontinence 2.5 weeks ago- since resolved..following closely with PM for intervention as needed. Always feels better after injections MIKE but afte 2 m pain returns. No recent PT.. needs to reconsider.  At this point.. gonzalez not have  urinary incontinence,  denies saddle anesthesia.  Repeat MR MORRIS pending, prescription provided by PM. Needs follow-up on this issue.\par \par Plan:\par - Labs now\par \par -updated MR to PM for possible interventional procedure again \par \par - Continue Gabapentin  can take up to to 600 mg (1-2 x 300 mg tablet) three times a day, but careful with titration due to dizziness.\par \par - Psychiatrist -Rx Ambien, but not the best drug should consider another agent - ask psychiatrist (Trazodone increase ??) in meantime, try cyclobenzaprine 10 mg at night time for sleep \par \par - likely needs PT, should have routine exercise regimen.. \par \par - Can use Cyclobenzaprine 10-20  mg for sleep as well, at night time \par \par - RPA in 6 m

## 2022-10-12 ENCOUNTER — APPOINTMENT (OUTPATIENT)
Dept: INTERNAL MEDICINE | Facility: CLINIC | Age: 52
End: 2022-10-12
Payer: MEDICARE

## 2022-10-12 VITALS — WEIGHT: 223 LBS | BODY MASS INDEX: 40.79 KG/M2 | DIASTOLIC BLOOD PRESSURE: 62 MMHG | SYSTOLIC BLOOD PRESSURE: 124 MMHG

## 2022-10-12 DIAGNOSIS — N64.4 MASTODYNIA: ICD-10-CM

## 2022-10-12 PROCEDURE — 99214 OFFICE O/P EST MOD 30 MIN: CPT

## 2022-10-12 NOTE — PHYSICAL EXAM
[No Acute Distress] : no acute distress [Well Nourished] : well nourished [Well Developed] : well developed [Well-Appearing] : well-appearing [Normal Sclera/Conjunctiva] : normal sclera/conjunctiva [PERRL] : pupils equal round and reactive to light [EOMI] : extraocular movements intact [Normal Outer Ear/Nose] : the outer ears and nose were normal in appearance [Normal Oropharynx] : the oropharynx was normal [No JVD] : no jugular venous distention [No Lymphadenopathy] : no lymphadenopathy [Supple] : supple [Thyroid Normal, No Nodules] : the thyroid was normal and there were no nodules present [No Respiratory Distress] : no respiratory distress  [No Accessory Muscle Use] : no accessory muscle use [Clear to Auscultation] : lungs were clear to auscultation bilaterally [Normal Rate] : normal rate  [Regular Rhythm] : with a regular rhythm [Normal S1, S2] : normal S1 and S2 [No Murmur] : no murmur heard [No Carotid Bruits] : no carotid bruits [No Abdominal Bruit] : a ~M bruit was not heard ~T in the abdomen [No Varicosities] : no varicosities [Pedal Pulses Present] : the pedal pulses are present [No Edema] : there was no peripheral edema [No Palpable Aorta] : no palpable aorta [No Extremity Clubbing/Cyanosis] : no extremity clubbing/cyanosis [Soft] : abdomen soft [Non Tender] : non-tender [Non-distended] : non-distended [No Masses] : no abdominal mass palpated [No HSM] : no HSM [Normal Bowel Sounds] : normal bowel sounds [Normal Posterior Cervical Nodes] : no posterior cervical lymphadenopathy [Normal Anterior Cervical Nodes] : no anterior cervical lymphadenopathy [No CVA Tenderness] : no CVA  tenderness [No Spinal Tenderness] : no spinal tenderness [No Rash] : no rash [Coordination Grossly Intact] : coordination grossly intact [No Focal Deficits] : no focal deficits [Normal Gait] : normal gait [Deep Tendon Reflexes (DTR)] : deep tendon reflexes were 2+ and symmetric [Normal Affect] : the affect was normal [Normal Insight/Judgement] : insight and judgment were intact [de-identified] : right shoulder pain

## 2022-10-12 NOTE — ASSESSMENT
[FreeTextEntry1] : right shoulder pain, steroids consider cortisone shot\par breast pain \par consider breast reduction surgery\par

## 2022-10-12 NOTE — HISTORY OF PRESENT ILLNESS
[FreeTextEntry1] : right shoulder pain\par breast pain  [de-identified] : right should pain \par breast pain

## 2022-10-20 ENCOUNTER — APPOINTMENT (OUTPATIENT)
Dept: MAMMOGRAPHY | Facility: CLINIC | Age: 52
End: 2022-10-20
Payer: MEDICARE

## 2022-10-20 ENCOUNTER — RESULT REVIEW (OUTPATIENT)
Age: 52
End: 2022-10-20

## 2022-10-20 ENCOUNTER — OUTPATIENT (OUTPATIENT)
Dept: OUTPATIENT SERVICES | Facility: HOSPITAL | Age: 52
LOS: 1 days | End: 2022-10-20
Payer: MEDICAID

## 2022-10-20 ENCOUNTER — APPOINTMENT (OUTPATIENT)
Dept: ULTRASOUND IMAGING | Facility: CLINIC | Age: 52
End: 2022-10-20
Payer: MEDICARE

## 2022-10-20 DIAGNOSIS — Z98.51 TUBAL LIGATION STATUS: Chronic | ICD-10-CM

## 2022-10-20 DIAGNOSIS — Z98.89 OTHER SPECIFIED POSTPROCEDURAL STATES: Chronic | ICD-10-CM

## 2022-10-20 DIAGNOSIS — N64.4 MASTODYNIA: ICD-10-CM

## 2022-10-20 PROCEDURE — 77066 DX MAMMO INCL CAD BI: CPT

## 2022-10-20 PROCEDURE — G0279: CPT | Mod: 26

## 2022-10-20 PROCEDURE — 77066 DX MAMMO INCL CAD BI: CPT | Mod: 26

## 2022-10-20 PROCEDURE — G0279: CPT

## 2022-11-09 ENCOUNTER — NON-APPOINTMENT (OUTPATIENT)
Age: 52
End: 2022-11-09

## 2022-11-10 ENCOUNTER — APPOINTMENT (OUTPATIENT)
Dept: ORTHOPEDIC SURGERY | Facility: CLINIC | Age: 52
End: 2022-11-10
Payer: MEDICARE

## 2022-11-10 ENCOUNTER — TRANSCRIPTION ENCOUNTER (OUTPATIENT)
Age: 52
End: 2022-11-10

## 2022-11-10 VITALS
HEIGHT: 62 IN | BODY MASS INDEX: 41.04 KG/M2 | SYSTOLIC BLOOD PRESSURE: 126 MMHG | HEART RATE: 92 BPM | WEIGHT: 223 LBS | DIASTOLIC BLOOD PRESSURE: 82 MMHG

## 2022-11-10 PROCEDURE — 20610 DRAIN/INJ JOINT/BURSA W/O US: CPT | Mod: RT

## 2022-11-10 PROCEDURE — 99213 OFFICE O/P EST LOW 20 MIN: CPT | Mod: 25
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
## 2022-11-10 NOTE — DISCUSSION/SUMMARY
[de-identified] : Assessment: 52-year-old female with bilateral shoulder pain that is worse on the right\par \par Plan: I had a long discussion with the patient today regarding the nature of their diagnosis and treatment plan. We discussed the risks and benefits of no treatment as well as nonoperative and operative treatments.  I reviewed the patient's MRI today with her in the office which demonstrates mild tendinopathy of the rotator cuff without any full-thickness tearing.  At this time I am recommending continued conservative treatment including ice, heat, rest, and nabumetone twice daily with food for pain and inflammation, physical therapy, avoiding exacerbating activities.  GI precautions were discussed and prescriptions were provided today.  I am also recommending a cortisone injection of the right shoulder be administered in the office today for diagnostic and therapeutic purposes.  She tolerated the procedure well with no adverse effects.  She was given prescriptions in the office today for physical therapy and anti-inflammatory medication.  She will follow-up in 6 to 8 weeks for repeat evaluation if she continues to be symptomatic and we may recommend repeat MRI of the right shoulder and new MRI of the left shoulder if her symptoms persist despite conservative treatment.  I also advised her to follow-up with her rheumatologist for further evaluation for her fibromyalgia.  Patient discussed and reviewed with Dr. Nicholson today.\par \par The patient verbalizes their understanding and agrees with the plan.  All questions were answered to their satisfaction.

## 2022-11-10 NOTE — PROCEDURE
[de-identified] : I injected the patient's right shoulder today with cortisone.\par  \par I discussed at length with the patient the planned steroid and lidocaine injection. The risks, benefits, convalescence and alternatives were reviewed. The possible side effects discussed included but were not limited to: pain, swelling, heat, bleeding, and redness. Symptoms are generally mild but if they are extensive then contact the office. Giving pain relievers by mouth such as NSAIDs or Tylenol can generally treat the reactions to steroid and lidocaine. Rare cases of infection have been noted. Rash, hives and itching may occur post injection. If you have muscle pain or cramps, flushing and or swelling of the face, rapid heart beat, nausea, dizziness, fever, chills, headache, difficulty breathing, swelling in the arms or legs, or have a prickly feeling of your skin, contact a health care provider immediately. Following this discussion, the shoulder was prepped with Chlorhexidine and Alcohol and under sterile conditions the 80 mg Depo-Medrol and 4 cc Lidocaine injection was performed with a 22 gauge needle through a posterolateral injection site. The needle was introduced into the subacromial space and the medication was injected. Upon withdrawal of the needle the site was cleaned with alcohol and a band aid was applied. The patient tolerated the injection well and there were no adverse effects. Post injection instructions included no strenuous activity for 24 hours, cryotherapy and if there are any adverse effects to contact the office.

## 2022-11-10 NOTE — PHYSICAL EXAM
[de-identified] : General:\par Awake, alert, no acute distress, Patient was cooperative and appropriate during the examination.\par \par The patient is overweight for height and age.\par \par Walks without an antalgic gait.\par \par Full, painless range of motion of the neck and back.\par \par Exam of the bilateral lower extremities is intact and symmetric with regards to dermatologic, vascular, and neurologic exam. Bilateral lower extremity sensation is grossly intact to light touch in the DP/SP/T/S/S nerve distributions. Intact DF/PF/EHL. BIlateral lower extremities warm and well-perfused with brisk capillary refill.\par \par \par Pulmonary:\par Regular, nonlabored breathing\par \par Abdomen:\par Soft, nontender, nondistended.\par \par Lymphatic:\par No evidence of axillary lymphadenopathy\par \par Bilateral Shoulder Exam:\par Physical exam of the shoulders demonstrates normal skin without signs of skin changes or abnormalities. No erythema, warmth, or joint effusion appreciated.\par \par Sensation intact light touch C5-T1 bilaterally\par Palpable radial pulses\par Radial/ulnar/median/axillary/musculocutaneous/AIN/PIN nerves grossly intact\par \par Range of motion:\par Forward Flexion: 150 actively, 170 passively on the right, 170 degrees on the left\par Abduction: 90 actively, 110 passively on the right, 120 in the left\par External Rotation: 45 bilaterally\par Internal Rotation: Back pocket on the right, L3 on the left\par \par Palpation:\par Globally tender to palpation about both shoulders, worse on the right, out of proportion to physical examination\par She has diffuse tenderness palpation over the trapezial, paracervical and periscapular muscles on the right side with no cervical midline tenderness.\par \par Strength testing:\par Supraspinatus: 5/5 on the left, 5- out of 5 on the right\par Infraspinatus: 5/5 on the left, 5-/5 on the right\par Subscapularis: 5/5 bilaterally\par \par Special test:\par Empty can test positive bilaterally\par Hernandez impingement test positive bilaterally\par Speeds test positive bilaterally\par Rowan's test negative bilaterally\par Lift-off test negative bilaterally\par Bell-press test negative bilaterally\par Cross-arm adduction test negative bilaterally [de-identified] : MRI of the right shoulder from 5/4/2021 and a [facility] imaging facility was reviewed the patient today in the office.  The patient has mild tendinosis/fraying of the anterior infraspinatus and posterior supraspinatus.  There is no discrete tear.  There is low-grade chondral thinning of the posterior glenoid.

## 2022-11-18 ENCOUNTER — APPOINTMENT (OUTPATIENT)
Dept: OBGYN | Facility: CLINIC | Age: 52
End: 2022-11-18
Payer: MEDICARE

## 2022-11-18 ENCOUNTER — NON-APPOINTMENT (OUTPATIENT)
Age: 52
End: 2022-11-18

## 2022-11-18 VITALS
WEIGHT: 221 LBS | SYSTOLIC BLOOD PRESSURE: 120 MMHG | BODY MASS INDEX: 40.67 KG/M2 | DIASTOLIC BLOOD PRESSURE: 70 MMHG | HEIGHT: 62 IN

## 2022-11-18 PROCEDURE — 99386 PREV VISIT NEW AGE 40-64: CPT

## 2022-11-18 PROCEDURE — 99396 PREV VISIT EST AGE 40-64: CPT

## 2022-11-18 RX ORDER — METHYLPREDNISOLONE 4 MG/1
4 TABLET ORAL
Qty: 1 | Refills: 0 | Status: DISCONTINUED | COMMUNITY
Start: 2022-10-12 | End: 2022-11-18

## 2022-11-18 RX ORDER — TRAZODONE HYDROCHLORIDE 150 MG/1
150 TABLET ORAL
Qty: 30 | Refills: 0 | Status: DISCONTINUED | COMMUNITY
Start: 2022-04-27

## 2022-11-18 NOTE — PLAN
[FreeTextEntry1] : Will call patient with any abnormal results. \par All questions and concerns addressed during encounter. Pt. agreed to plan of care. \par \par F/U in 1 year.

## 2022-11-18 NOTE — HISTORY OF PRESENT ILLNESS
[Y] : Patient is sexually active [Menarche Age: ____] : age at menarche was [unfilled] [Menopause Age: ____] : age at menopause was [unfilled] [FreeTextEntry1] : 52 year old female presents for annual examination. Pt. had hysterectomy performed several years ago for menorrhagia. No complaints of pelvic pain and vaginal discharge. Pt. had mammogram performed this year BIRAD 1.  [PGHxTotal] : 3 [[facility] : 2 [PGHxPremature] : 0 [PGHxAbortions] : 1 [[facility]] : 2 [PGHxABSpont] : 0 [PGHxABInduced] : 1 [PGHxEctopic] : 0 [PGHxMultBirths] : 0

## 2022-11-21 LAB — HPV HIGH+LOW RISK DNA PNL CVX: NOT DETECTED

## 2022-11-29 LAB — CYTOLOGY CVX/VAG DOC THIN PREP: ABNORMAL

## 2022-12-05 ENCOUNTER — RX RENEWAL (OUTPATIENT)
Age: 52
End: 2022-12-05

## 2022-12-25 NOTE — ED PROVIDER NOTE - OBJECTIVE STATEMENT
47 yo F presents to ED c/o epigastric abd pain since last night with increased nausea.  Pt denies any assoc fever, vomiting, diarrhea or urinary s/s.  Pt denies any hx of similar pain in the past.  Pt denies any strange foods or sick contacts Patent

## 2023-01-05 ENCOUNTER — APPOINTMENT (OUTPATIENT)
Dept: ORTHOPEDIC SURGERY | Facility: CLINIC | Age: 53
End: 2023-01-05

## 2023-01-26 ENCOUNTER — APPOINTMENT (OUTPATIENT)
Dept: NEUROLOGY | Facility: CLINIC | Age: 53
End: 2023-01-26

## 2023-01-30 ENCOUNTER — APPOINTMENT (OUTPATIENT)
Dept: ORTHOPEDIC SURGERY | Facility: CLINIC | Age: 53
End: 2023-01-30
Payer: MEDICARE

## 2023-01-30 VITALS
BODY MASS INDEX: 40.67 KG/M2 | HEART RATE: 99 BPM | DIASTOLIC BLOOD PRESSURE: 83 MMHG | SYSTOLIC BLOOD PRESSURE: 124 MMHG | HEIGHT: 62 IN | WEIGHT: 221 LBS

## 2023-01-30 PROCEDURE — 99213 OFFICE O/P EST LOW 20 MIN: CPT

## 2023-01-30 NOTE — DISCUSSION/SUMMARY
[de-identified] : Assessment: 52-year-old female with bilateral shoulder pain that is worse on the right\par \par Plan: I had a long discussion with the patient today regarding the nature of their diagnosis and treatment plan. We discussed the risks and benefits of no treatment as well as nonoperative and operative treatments.  I reviewed the patient's MRI today with her in the office which demonstrates mild tendinopathy of the rotator cuff without any full-thickness tearing.  At this time I am recommending continued conservative treatment including ice, heat, rest, and naproxen twice daily with food for pain and inflammation, physical therapy, avoiding exacerbating activities.  GI precautions were discussed and prescriptions were provided today.  She was also given a muscle relaxer to be used for pain to help her sleep.  She was advised to take this at night and not to operate machinery following taking the medication.  I am also recommending that she follow-up with her cervical spine doctor for further evaluation due to her continued pain as her symptoms may be originating from her neck.  I am also recommending that she follow-up with her rheumatologist as her symptoms might be rheumatological in nature given her lack of improvement with physical therapy, medications and injections up to this point.  She was also given a pain management referral since she is not having improvement in her symptoms with the NSAIDs and Tylenol up to this point.  I am also recommending an MRI for  further evaluation because the old MRI is old and outdated for the right shoulder.  She will follow-up with us after the MRI is complete.  If the MRI does not show any significant structural issues we would likely advised her to follow-up with her rheumatologist and cervical spine doctor as those could be the cause of her symptoms.  She understood with this and agreed with the treatment plan and all questions were answered to her satisfaction.  Patient discussed and reviewed with Dr. Nicholson today.\par \par The patient verbalizes their understanding and agrees with the plan.  All questions were answered to their satisfaction.
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
## 2023-01-30 NOTE — PHYSICAL EXAM
[de-identified] : General:\par Awake, alert, no acute distress, Patient was cooperative and appropriate during the examination.\par \par The patient is overweight for height and age.\par \par Walks without an antalgic gait.\par \par Full, painless range of motion of the neck and back.\par \par Exam of the bilateral lower extremities is intact and symmetric with regards to dermatologic, vascular, and neurologic exam. Bilateral lower extremity sensation is grossly intact to light touch in the DP/SP/T/S/S nerve distributions. Intact DF/PF/EHL. BIlateral lower extremities warm and well-perfused with brisk capillary refill.\par \par \par Pulmonary:\par Regular, nonlabored breathing\par \par Abdomen:\par Soft, nontender, nondistended.\par \par Lymphatic:\par No evidence of axillary lymphadenopathy\par \par Right shoulder Exam:\par Physical exam of the shoulders demonstrates normal skin without signs of skin changes or abnormalities. No erythema, warmth, or joint effusion appreciated.\par \par Sensation intact light touch C5-T1 bilaterally\par Palpable radial pulses\par Radial/ulnar/median/axillary/musculocutaneous/AIN/PIN nerves grossly intact\par \par Range of motion:\par Forward Flexion: 90 actively, 170 passively on the right\par Abduction: 90 actively, 110 passively on the right, \par External Rotation: 45 bilaterally\par Internal Rotation: Back pocket on the right\par \par Palpation:\par Globally tender to palpation about the right, out of proportion to physical examination\par She has diffuse tenderness palpation over the trapezial, paracervical and periscapular, tricep, bicep, latissimus muscles on the right side with no cervical midline tenderness.\par \par Strength testing:\par Supraspinatus:  5- out of 5 on the right limited secondary to pain\par Infraspinatus:  5-/5 on the right limited secondary to pain\par Subscapularis: 5/5 \par \par Special test:\par Empty can test positive bilaterally\par Hernandez impingement test positive bilaterally\par Speeds test positive bilaterally\par Becker's test negative bilaterally\par Lift-off test negative bilaterally\par Bell-press test negative bilaterally\par Cross-arm adduction test negative bilaterally [de-identified] : MRI of the right shoulder from 5/4/2021 and a [facility] imaging facility was reviewed the patient today in the office.  The patient has mild tendinosis/fraying of the anterior infraspinatus and posterior supraspinatus.  There is no discrete tear.  There is low-grade chondral thinning of the posterior glenoid.

## 2023-02-01 ENCOUNTER — OUTPATIENT (OUTPATIENT)
Dept: OUTPATIENT SERVICES | Facility: HOSPITAL | Age: 53
LOS: 1 days | End: 2023-02-01

## 2023-02-01 ENCOUNTER — APPOINTMENT (OUTPATIENT)
Dept: MRI IMAGING | Facility: CLINIC | Age: 53
End: 2023-02-01
Payer: MEDICARE

## 2023-02-01 DIAGNOSIS — Z98.51 TUBAL LIGATION STATUS: Chronic | ICD-10-CM

## 2023-02-01 DIAGNOSIS — Z98.89 OTHER SPECIFIED POSTPROCEDURAL STATES: Chronic | ICD-10-CM

## 2023-02-01 DIAGNOSIS — M25.511 PAIN IN RIGHT SHOULDER: ICD-10-CM

## 2023-02-01 PROCEDURE — 73221 MRI JOINT UPR EXTREM W/O DYE: CPT | Mod: 26,RT

## 2023-02-02 ENCOUNTER — LABORATORY RESULT (OUTPATIENT)
Age: 53
End: 2023-02-02

## 2023-02-02 ENCOUNTER — APPOINTMENT (OUTPATIENT)
Dept: RHEUMATOLOGY | Facility: CLINIC | Age: 53
End: 2023-02-02
Payer: MEDICARE

## 2023-02-02 VITALS
HEIGHT: 62 IN | HEART RATE: 115 BPM | WEIGHT: 229 LBS | OXYGEN SATURATION: 97 % | TEMPERATURE: 97.3 F | SYSTOLIC BLOOD PRESSURE: 110 MMHG | BODY MASS INDEX: 42.14 KG/M2 | DIASTOLIC BLOOD PRESSURE: 72 MMHG

## 2023-02-02 PROCEDURE — 99214 OFFICE O/P EST MOD 30 MIN: CPT

## 2023-02-02 NOTE — HISTORY OF PRESENT ILLNESS
[FreeTextEntry1] : 2/2/2023\par \par - R shoulder has been bothering her for some years....fell in 2021 on concrete..broke her wrist and her shoulder has been hurting ever since....11/2022 it got worse and she presented to orthopedist - much worse now frozen with MR repeatedly 4/22 and again 2/23- diffuse tendinosis, bursitis (essentiallly stable)\par - was given an injection by orthopedist a couple of weeks ago, but it was not effective.... was told by orthopedist that her pain might be rheumatological.....pain does not seem to be a result of her rotator cuff.....was given a muscle relaxer which has not been effective.  Minimal improvement with steroids locally or medrol dose.. if effective only for few days - immediately returns when off \par - She has been taking anti-inflammatories nightly and does not find it effective (naproxen 500 mg).\par - was previously in PT, but it was D/C by physical therapist.....she was not getting better, PT did not want to further exacerbate the issue  \par - has not been getting sleep even w/ ambien due to the pain.....wakes up w/stiffness in hands and feet in the morning but stiffness is resolved when she cracks them \par - had an MRI in her R shoulder 2/1/2023....supraspinatus tendinosis and chronic partial thickness articular sided thinning/attenuation of the supraspinatus tendon - look at report for more....imaging is similar to MRI in 2021\par - had imaging of her neck a couple years ago and has a bulging disc on her C3 \par - has hx of diabetes in family, but she personally has never had it....has not checked since 2020\par - function: does things around the house, but tasks such as laundry is difficult\par - 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 Sanchez.. started on Topamax... and dx x many ys FM.. Current symptoms:  \par Diffuse arthralgias/ myalgias "everywhere" w/ no overt joint swelling. Pain varies.. fairly controlled, pain worse as day progresses\par Migraines as noted improved lately w/ topamax \par BRENDAN works w/ psychologist on trazodone, duloxetine 40 mg daily and 75 mg wellbutrin... and psychotherapy..\par Sleep:  requires trazodone w/ + response, can't sleep without. Trouble falling/ staying asleep even with 200 mg trazodone and 50 mg topiramate... \par No formal exercise "was told not to... and got in trouble when tried" \par NOTE:\par - Seroquel 300 mg in past- off past yr stopped for logistical reasons... \par - gabapentin 600 mg TID w/ + response, stopped when provider left\par - Xanax poor response.. too dizzy and fell "into a bathtub"\par - Ambien excellent respones helped with sleep \par \par \par 2) Recent fall:  does have hx vertigo.. but can't remember why fell.  \par R wrist fracture... w/ recent fall- ? etiology of fall unknown, w/u in progress\par Dizziness persists.. can't afford balance tx... not NEW \par \par 3) Anemia:  s/p ADRIA 2015 w/ improvement since then..\par \par 4) Obesity:  s/p gastric sleeve 6/2018 total 75 lb ... now 187.. stable in past 3 m\par \par Labs 5/28/20 low T4 0.4 w/ nl TSH 2, Vit D 27 nl CBC w/ plt 480 , CMP, folate/ B12, UA, Hba1c 5.5 Covid Ab neg
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
## 2023-02-02 NOTE — ASSESSMENT
[FreeTextEntry1] : 53 yo AAF w/ hx Migraines, Vertigo, Obesity, and long standing dx FM, with overall poor insight on personal health, poor compliance and lack of regular follow-up with multiple providers. \par Overall wellbeing greatly improved this visit...previous allodynia diffusely resolved \par \par 1) FM (likely secondary to BRENDAN w/ Depression x many ys): Taking trazodone 100 mg  daily hs and duloxetine 40 mg daily and bupropion 75 mg daily - sees therapist once weekly and psychiatrist every 3 months. Fairly well controlled Depression/Anxiety.  Greatly improved with control of recent stressors, will be staying in home and living with parents.  Also restarted gabapentin with + response.   \par - Migraines, intermittent and well controlled now..  with recent ED visit, taking Topamax 50 mg BID, Sumatriptan 100 mg abortive therapy, multiple MRI brains have been nl in the past.\par - Sleep: required Ambien in the past with + response, on Trazodone 100 mg now with only 4-5 hrs of sleep without Ambien.  Attempts to stop ambien not tolerated, continues routinely... \par - Exercise: has been told by some clinician in the past that she shouldn't? \par NOTE:\par - Seroquel 300 mg in past- off past yr stopped for logistical reasons... \par - gabapentin 600 mg TID w/ + response, stopped when provider left, since restarted\par - Xanax poor response.. too dizzy and fell "into a bathtub"\par - Ambien excellent response helped with sleep\par Labs 5/28/20 low T4 0.4 w/ nl TSH 2, Vit D 27 nl CBC w/ plt 480 , CMP, folate/ B12, UA, Hba1c 5.5 Covid Ab neg \par updated labs needed  \par \par 2) Dizziness:  dx w/ Vertigo from young age, was doing dedicated PT but stopped w/ pandemic. Has tried meclizine in the past with no response... MRI brain nl in the past. not active at this point \par NOTE: \par  Anemia:  s/p ADRIA 2015 w/ improvement since then..\par \par 4) Obesity:  s/p gastric sleeve 6/2018 went down to 178 lbs... now increased to 199 lbs... not willing to consider exercise regimen at this time, see above-> now up to 229 w/ BMI 41.. .\par Borderline HgbA1c 5.7  in the past 2020 - will repeat today\par \par 5) Hypothyroidism?:  nl TSH but T 4 low in 2020... will repeat today.\par \par 6) Lumbar radiculopathy w/ bowel incontinence:   recurrent episodes (1X monthly since 2020) of bowel incontinence 2.5 weeks ago- since resolved..following closely with PM for intervention as needed. Always feels better after injections MIKE but afte 2 m pain returns. No recent PT.. needs to reconsider.  At this point.. gonzalez not have  urinary incontinence,  denies saddle anesthesia.  Repeat MR MORRIS pending, prescription provided by PM. Needs follow-up on this issue.\par \par 7) R shoulder frozen:  for past few ys pain/ swelling and completely frozen since 11/22... failed NSAID, IACS and medrol dose... will get updated studies today, previous assessment for systemic process neg \par Will try longer course of steroids 20 mg x 1 wk, then 10 mg daily x 1 wk, will f/u in 2 wk\par \par Plan:\par - complete labs \par \par - starting on 20 mg of prednisone (10 mg in the morning and 10 mg at night) for two weeks \par \par - RTO in 2 weeks\par \par \par - Continue Gabapentin  can take up to to 600 mg (1-2 x 300 mg tablet) three times a day, but careful with titration due to dizziness.\par \par - likely needs PT, should have routine exercise regimen.. \par \par - Can use Cyclobenzaprine 10-20  mg for sleep as well, at night time \par

## 2023-02-02 NOTE — PHYSICAL EXAM
[General Appearance - Alert] : alert [General Appearance - In No Acute Distress] : in no acute distress [General Appearance - Well Nourished] : well nourished [General Appearance - Well Developed] : well developed [General Appearance - Well-Appearing] : healthy appearing [Abnormal Walk] : normal gait [Nail Clubbing] : no clubbing  or cyanosis of the fingernails [Involuntary Movements] : no involuntary movements were seen [Musculoskeletal - Swelling] : no joint swelling seen [Motor Tone] : muscle strength and tone were normal [Skin Color & Pigmentation] : normal skin color and pigmentation [Skin Turgor] : normal skin turgor [Skin Lesions] : no skin lesions [No Focal Deficits] : no focal deficits [Oriented To Time, Place, And Person] : oriented to person, place, and time [Sclera] : the sclera and conjunctiva were normal [Outer Ear] : the ears and nose were normal in appearance [Examination Of The Oral Cavity] : the lips and gums were normal [Nasal Cavity] : the nasal mucosa and septum were normal [Oropharynx] : the oropharynx was normal [] : no respiratory distress [Auscultation Breath Sounds / Voice Sounds] : lungs were clear to auscultation bilaterally [Heart Rate And Rhythm] : heart rate was normal and rhythm regular [Heart Sounds] : normal S1 and S2 [Heart Sounds Gallop] : no gallops [Murmurs] : no murmurs [Heart Sounds Pericardial Friction Rub] : no pericardial rub [Full Pulse] : the pedal pulses are present [Edema] : there was no peripheral edema [FreeTextEntry1] : better now with stable home.. no longer tearful..

## 2023-02-09 ENCOUNTER — APPOINTMENT (OUTPATIENT)
Dept: ORTHOPEDIC SURGERY | Facility: CLINIC | Age: 53
End: 2023-02-09

## 2023-02-10 LAB
ALBUMIN SERPL ELPH-MCNC: 4.1 G/DL
ALP BLD-CCNC: 161 U/L
ALT SERPL-CCNC: 41 U/L
ANION GAP SERPL CALC-SCNC: 13 MMOL/L
APPEARANCE: ABNORMAL
AST SERPL-CCNC: 33 U/L
BASOPHILS # BLD AUTO: 0.06 K/UL
BASOPHILS NFR BLD AUTO: 0.8 %
BILIRUB SERPL-MCNC: <0.2 MG/DL
BILIRUBIN URINE: NEGATIVE
BLOOD URINE: NEGATIVE
BUN SERPL-MCNC: 8 MG/DL
C3 SERPL-MCNC: 199 MG/DL
C4 SERPL-MCNC: 74 MG/DL
CALCIUM SERPL-MCNC: 9.7 MG/DL
CCP AB SER IA-ACNC: <8 UNITS
CHLORIDE SERPL-SCNC: 103 MMOL/L
CO2 SERPL-SCNC: 27 MMOL/L
COLOR: YELLOW
CREAT SERPL-MCNC: 0.75 MG/DL
CRP SERPL-MCNC: 23 MG/L
EGFR: 96 ML/MIN/1.73M2
EOSINOPHIL # BLD AUTO: 0.13 K/UL
EOSINOPHIL NFR BLD AUTO: 1.7 %
ERYTHROCYTE [SEDIMENTATION RATE] IN BLOOD BY WESTERGREN METHOD: 73 MM/HR
GLUCOSE QUALITATIVE U: NEGATIVE
GLUCOSE SERPL-MCNC: 69 MG/DL
HCT VFR BLD CALC: 39.7 %
HGB BLD-MCNC: 11.8 G/DL
IMM GRANULOCYTES NFR BLD AUTO: 0.5 %
KETONES URINE: NEGATIVE
LEUKOCYTE ESTERASE URINE: ABNORMAL
LYMPHOCYTES # BLD AUTO: 2.53 K/UL
LYMPHOCYTES NFR BLD AUTO: 33.4 %
MAN DIFF?: NORMAL
MCHC RBC-ENTMCNC: 24.1 PG
MCHC RBC-ENTMCNC: 29.7 GM/DL
MCV RBC AUTO: 81 FL
MONOCYTES # BLD AUTO: 0.66 K/UL
MONOCYTES NFR BLD AUTO: 8.7 %
NEUTROPHILS # BLD AUTO: 4.16 K/UL
NEUTROPHILS NFR BLD AUTO: 54.9 %
NITRITE URINE: NEGATIVE
PH URINE: 6.5
PLATELET # BLD AUTO: 501 K/UL
POTASSIUM SERPL-SCNC: 4.5 MMOL/L
PROT SERPL-MCNC: 7.2 G/DL
PROTEIN URINE: NORMAL
RBC # BLD: 4.9 M/UL
RBC # FLD: 17.1 %
RF+CCP IGG SER-IMP: NEGATIVE
RHEUMATOID FACT SER QL: <10 IU/ML
SODIUM SERPL-SCNC: 142 MMOL/L
SPECIFIC GRAVITY URINE: 1.03
UROBILINOGEN URINE: ABNORMAL
WBC # FLD AUTO: 7.58 K/UL

## 2023-02-11 LAB — HLA-B27 RELATED AG QL: NEGATIVE

## 2023-02-14 ENCOUNTER — APPOINTMENT (OUTPATIENT)
Dept: ORTHOPEDIC SURGERY | Facility: CLINIC | Age: 53
End: 2023-02-14
Payer: MEDICARE

## 2023-02-14 VITALS
WEIGHT: 218 LBS | HEART RATE: 91 BPM | BODY MASS INDEX: 40.12 KG/M2 | HEIGHT: 62 IN | DIASTOLIC BLOOD PRESSURE: 81 MMHG | SYSTOLIC BLOOD PRESSURE: 119 MMHG

## 2023-02-14 DIAGNOSIS — M75.00 ADHESIVE CAPSULITIS OF UNSPECIFIED SHOULDER: ICD-10-CM

## 2023-02-14 PROCEDURE — 72040 X-RAY EXAM NECK SPINE 2-3 VW: CPT

## 2023-02-14 PROCEDURE — 99214 OFFICE O/P EST MOD 30 MIN: CPT

## 2023-02-14 NOTE — PHYSICAL EXAM
[de-identified] : CONSTITUTIONAL:  Patient is a very pleasant individual who is well-nourished and appears stated age. \par PSYCHIATRIC:  Alert and oriented times three and in no apparent distress, and participates with orthopedic evaluation well.\par HEAD:  Atraumatic and  nonsyndromic in appearance.\par EENT: No thyromegaly, EOMI.\par RESPIRATORY:  Respiratory rate is regular, not dyspneic on examination.\par LYMPHATICS:  There is no cervical or axillary lymphadenopathy.\par INTEGUMENTARY:  Skin is clean, dry, and intact about the bilateral upper extremities and cervical spine. \par VASCULAR:   There is brisk capillary refill about the bilateral upper extremities and radial pulses are 2/4. \par NEUROLOGIC:  Negative L'hirmitte, negative Spurling's sign. There are no pathologic reflexes. There is no decrease in sensation of the bilateral upper extremities on Wartenberg pinwheel examination.  Deep tendon reflexes are well-maintained at +2/4 of the bilateral upper extremities and are symmetric.\par MUSCULOSKELETAL:  There is no visible muscular atrophy.  Manual motor strength is well maintained in the bilateral upper extremities.  Cervical range of motion is well maintained.  The patient ambulates in a non-myelopathic manner. Normal secondary orthopaedic exam of bilateral shoulders, elbows and hands.  Elbow flexion and extension, wrist extension, finger flexion and abduction are well maintained.\par A nonfocal cervical exam there is a negative Lhermitte's negative Spurling's she gets no relief with forward flexion of her neck she states it actually bothers her neck which may be a sign of myositis she states she also gets no relief of raising her arm overhead which I do not think is indicative of a cervical radiculopathy.\par   [de-identified] : Cervical x-rays were taken on today's date that shows focal 5 6 and to a lesser degree 4 5 cervical degenerative disc disease mild anterior osteophytes at 5 6 date of the x-ray February 14, 2023 2 views cervical spine.  Cervical MRI was reviewed from 2020 with very minimal age-appropriate cervical degenerative disc disease.  No operative indications at that point in time

## 2023-02-14 NOTE — HISTORY OF PRESENT ILLNESS
[de-identified] : Patient presents for evaluation of neck pain.  She had an MRI of the cervical spine in 2020 she states she has had no focal treatment on her neck she has had no physical therapy no injection she is on prednisone she states the only injection she has had is in the lumbar spine so she sent to her surgeon's office from a rheumatologist and Dr. Angelo Carter from a pain management perspective with absolutely no formal care patient is rather disgruntled and she states discectomy and chronic pain of my neck YEYO questionnaire is negative she has no focal radiculopathy she states she has chronic right shoulder pain [Worsening] : worsening [10] : a maximum pain level of 10/10 [Bending] : worsened by bending [Lifting] : worsened by lifting [Rest] : relieved by rest [Ataxia] : no ataxia [Incontinence] : no incontinence [Loss of Dexterity] : good dexterity [Urinary Ret.] : no urinary retention [de-identified] : Prednisone

## 2023-02-14 NOTE — DISCUSSION/SUMMARY
[de-identified] : Prior to surgical management such as ACDF procedures etc. I would recommend following up with [facility] for trial of cervical epidural injections patient patient will be placed in physical therapy.  I would also not order a cervical MRI at this point in time because the patient has no demonstratable or reproducible cervical radiculopathy she has had no physical therapy no injections etc.  Trial of conservative care prior to a repeat cervical MRI and if surgical indications exist patient can follow-up for surgical discussion.

## 2023-02-16 ENCOUNTER — LABORATORY RESULT (OUTPATIENT)
Age: 53
End: 2023-02-16

## 2023-02-16 ENCOUNTER — NON-APPOINTMENT (OUTPATIENT)
Age: 53
End: 2023-02-16

## 2023-02-16 ENCOUNTER — APPOINTMENT (OUTPATIENT)
Dept: INTERNAL MEDICINE | Facility: CLINIC | Age: 53
End: 2023-02-16
Payer: MEDICARE

## 2023-02-16 VITALS — WEIGHT: 225 LBS | HEIGHT: 62 IN | BODY MASS INDEX: 41.41 KG/M2

## 2023-02-16 VITALS — RESPIRATION RATE: 12 BRPM | DIASTOLIC BLOOD PRESSURE: 78 MMHG | HEART RATE: 72 BPM | SYSTOLIC BLOOD PRESSURE: 120 MMHG

## 2023-02-16 DIAGNOSIS — F32.A DEPRESSION, UNSPECIFIED: ICD-10-CM

## 2023-02-16 PROCEDURE — 93000 ELECTROCARDIOGRAM COMPLETE: CPT

## 2023-02-16 PROCEDURE — 36415 COLL VENOUS BLD VENIPUNCTURE: CPT

## 2023-02-16 PROCEDURE — G0439: CPT

## 2023-02-16 RX ORDER — BUPROPION HYDROCHLORIDE 75 MG/1
75 TABLET, FILM COATED ORAL
Qty: 90 | Refills: 2 | Status: DISCONTINUED | COMMUNITY
Start: 2020-07-18 | End: 2023-02-16

## 2023-02-16 RX ORDER — METHYLPREDNISOLONE 4 MG/1
4 TABLET ORAL
Qty: 1 | Refills: 1 | Status: DISCONTINUED | COMMUNITY
Start: 2023-01-26 | End: 2023-02-16

## 2023-02-16 RX ORDER — PREDNISONE 10 MG/1
10 TABLET ORAL
Qty: 28 | Refills: 0 | Status: DISCONTINUED | COMMUNITY
Start: 2023-02-02 | End: 2023-02-16

## 2023-02-16 RX ORDER — METHOCARBAMOL 500 MG/1
500 TABLET, FILM COATED ORAL 3 TIMES DAILY
Qty: 30 | Refills: 0 | Status: DISCONTINUED | COMMUNITY
Start: 2023-01-30 | End: 2023-02-16

## 2023-02-16 NOTE — HISTORY OF PRESENT ILLNESS
[FreeTextEntry1] : For CPE [de-identified] : For CPE\par history of Fibromyalgia has neck pain and back pain\par thinking about epidural\par has no chest pain sob nvd or palpita;tons

## 2023-02-16 NOTE — HEALTH RISK ASSESSMENT
[Very Good] : ~his/her~  mood as very good [No] : No [No falls in past year] : Patient reported no falls in the past year [0] : 2) Feeling down, depressed, or hopeless: Not at all (0) [PHQ-2 Negative - No further assessment needed] : PHQ-2 Negative - No further assessment needed [I have developed a follow-up plan documented below in the note.] : I have developed a follow-up plan documented below in the note. [[id number]] : 0 [HIV Test offered] : HIV Test offered [Hepatitis C test offered] : Hepatitis C test offered [Change in mental status noted] : No change in mental status noted [Language] : denies difficulty with language [Behavior] : denies difficulty with behavior [Learning/Retaining New Information] : denies difficulty learning/retaining new information [Handling Complex Tasks] : denies difficulty handling complex tasks [Reasoning] : denies difficulty with reasoning [Spatial Ability and Orientation] : denies difficulty with spatial ability and orientation [With Significant Other] : lives with significant other [Sexually Active] : sexually active [High Risk Behavior] : no high risk behavior [Fully functional (bathing, dressing, toileting, transferring, walking, feeding)] : Fully functional (bathing, dressing, toileting, transferring, walking, feeding) [Reports changes in hearing] : Reports no changes in hearing [Reports changes in vision] : Reports no changes in vision [Reports normal functional visual acuity (ie: able to read med bottle)] : Reports poor functional visual acuity.  [Reports changes in dental health] : Reports no changes in dental health [Smoke Detector] : smoke detector [Carbon Monoxide Detector] : carbon monoxide detector [Guns at Home] : no guns at home [Safety elements used in home] : safety elements used in home [Seat Belt] :  uses seat belt [Sunscreen] : does not use sunscreen [TB Exposure] : is not being exposed to tuberculosis [Caregiver Concerns] : does not have caregiver concerns [ColonoscopyDate] : due [AdvancecareDate] : 2/16/21

## 2023-02-16 NOTE — ASSESSMENT
[FreeTextEntry1] : FIbromylagia\par cervical radiculopathy\par ekg ok\par check labs\par due for mammogram\par anxiety depression better

## 2023-02-17 LAB
ALBUMIN SERPL ELPH-MCNC: 4.3 G/DL
ALP BLD-CCNC: 148 U/L
ALT SERPL-CCNC: 16 U/L
ANION GAP SERPL CALC-SCNC: 16 MMOL/L
AST SERPL-CCNC: 12 U/L
BASOPHILS # BLD AUTO: 0.08 K/UL
BASOPHILS NFR BLD AUTO: 0.6 %
BILIRUB SERPL-MCNC: 0.2 MG/DL
BUN SERPL-MCNC: 9 MG/DL
CALCIUM SERPL-MCNC: 9.8 MG/DL
CHLORIDE SERPL-SCNC: 99 MMOL/L
CHOLEST SERPL-MCNC: 186 MG/DL
CO2 SERPL-SCNC: 24 MMOL/L
CREAT SERPL-MCNC: 0.83 MG/DL
CREAT SPEC-SCNC: 177 MG/DL
EGFR: 85 ML/MIN/1.73M2
EOSINOPHIL # BLD AUTO: 0.1 K/UL
EOSINOPHIL NFR BLD AUTO: 0.8 %
ESTIMATED AVERAGE GLUCOSE: 126 MG/DL
GLUCOSE SERPL-MCNC: 77 MG/DL
HBA1C MFR BLD HPLC: 6 %
HCT VFR BLD CALC: 40.4 %
HCV AB SER QL: NONREACTIVE
HCV S/CO RATIO: 0.54 S/CO
HDLC SERPL-MCNC: 60 MG/DL
HGB BLD-MCNC: 12.3 G/DL
HIV1+2 AB SPEC QL IA.RAPID: NONREACTIVE
IMM GRANULOCYTES NFR BLD AUTO: 0.3 %
LDLC SERPL CALC-MCNC: 113 MG/DL
LYMPHOCYTES # BLD AUTO: 4.97 K/UL
LYMPHOCYTES NFR BLD AUTO: 39.3 %
MAN DIFF?: NORMAL
MCHC RBC-ENTMCNC: 24.5 PG
MCHC RBC-ENTMCNC: 30.4 GM/DL
MCV RBC AUTO: 80.3 FL
MICROALBUMIN 24H UR DL<=1MG/L-MCNC: <1.2 MG/DL
MICROALBUMIN/CREAT 24H UR-RTO: NORMAL MG/G
MONOCYTES # BLD AUTO: 0.86 K/UL
MONOCYTES NFR BLD AUTO: 6.8 %
NEUTROPHILS # BLD AUTO: 6.6 K/UL
NEUTROPHILS NFR BLD AUTO: 52.2 %
NONHDLC SERPL-MCNC: 126 MG/DL
PLATELET # BLD AUTO: 450 K/UL
POTASSIUM SERPL-SCNC: 4.1 MMOL/L
PROT SERPL-MCNC: 7.5 G/DL
RBC # BLD: 5.03 M/UL
RBC # FLD: 17.2 %
SODIUM SERPL-SCNC: 140 MMOL/L
T4 FREE SERPL-MCNC: 1.1 NG/DL
TRIGL SERPL-MCNC: 65 MG/DL
TSH SERPL-ACNC: 1.66 UIU/ML
WBC # FLD AUTO: 12.65 K/UL

## 2023-02-18 LAB
APPEARANCE: ABNORMAL
BILIRUBIN URINE: NEGATIVE
BLOOD URINE: NEGATIVE
COLOR: YELLOW
GLUCOSE QUALITATIVE U: NEGATIVE
KETONES URINE: NEGATIVE
LEUKOCYTE ESTERASE URINE: ABNORMAL
NITRITE URINE: POSITIVE
PH URINE: 6
PROTEIN URINE: NORMAL
SPECIFIC GRAVITY URINE: 1.02
UROBILINOGEN URINE: NORMAL

## 2023-02-28 ENCOUNTER — APPOINTMENT (OUTPATIENT)
Dept: PAIN MANAGEMENT | Facility: CLINIC | Age: 53
End: 2023-02-28
Payer: MEDICARE

## 2023-02-28 VITALS — HEIGHT: 62 IN | BODY MASS INDEX: 40.48 KG/M2 | WEIGHT: 220 LBS

## 2023-02-28 PROCEDURE — 99213 OFFICE O/P EST LOW 20 MIN: CPT

## 2023-02-28 NOTE — HISTORY OF PRESENT ILLNESS
[Neck] : neck [9] : 9 [Constant] : constant [Household chores] : household chores [Sleep] : sleep [Rest] : rest [Meds] : meds [Heat] : heat [Disabled] : Work status: disabled [] : Post Surgical Visit: no [FreeTextEntry6] : PIN AND NEDDLE  [FreeTextEntry7] : [facility] SHOULDER  [de-identified] : FELISHA, REACHING OVER HEAD HT

## 2023-02-28 NOTE — ASSESSMENT
[FreeTextEntry1] : Interim history\par The patient returns with pain that has been treated adequately in the past with epidural steroid injections.  The patient's complaints are consistent with radiculopathy. Patient's ADL's increase with prior MIKE.   The last injection gave greater than 60% reduction of the pain but now there is a return of symptoms. The patient is requesting a subsequent epidural steroid injection to alleviate pain and improve functional ability and quality of life.  Patient is a candidate.\par Objective findings\par Since the last visit, there have been no new imaging studies. THe patient has no new symptoms except the return of the their pain complaints. Motor and sensory function is unchanged.\par Plan\par Prior to any interventional pain management the patient will obtain new MRI of the cervical spine.  This is to elucidate the exact etiology of the pain since the last MRI was more than 3 years ago\par Spoke to patient about epidural steroid injections. Explained risks, benefits and alternatives including but not limited to the risk of infection, bleeding, headache, syncopal episode, failure to resolve issues, allergic reaction, symptom recurrence, allergic reaction, nerve injury, and increased pain. The patient understands the risks. All questions were answered. The patient is willing to proceed.\par

## 2023-03-06 ENCOUNTER — RESULT REVIEW (OUTPATIENT)
Age: 53
End: 2023-03-06

## 2023-03-06 ENCOUNTER — APPOINTMENT (OUTPATIENT)
Dept: MRI IMAGING | Facility: CLINIC | Age: 53
End: 2023-03-06
Payer: MEDICARE

## 2023-03-06 ENCOUNTER — OUTPATIENT (OUTPATIENT)
Dept: OUTPATIENT SERVICES | Facility: HOSPITAL | Age: 53
LOS: 1 days | End: 2023-03-06

## 2023-03-06 DIAGNOSIS — Z00.8 ENCOUNTER FOR OTHER GENERAL EXAMINATION: ICD-10-CM

## 2023-03-06 DIAGNOSIS — Z98.89 OTHER SPECIFIED POSTPROCEDURAL STATES: Chronic | ICD-10-CM

## 2023-03-06 DIAGNOSIS — Z98.51 TUBAL LIGATION STATUS: Chronic | ICD-10-CM

## 2023-03-06 PROCEDURE — 72141 MRI NECK SPINE W/O DYE: CPT | Mod: 26

## 2023-03-20 ENCOUNTER — APPOINTMENT (OUTPATIENT)
Dept: ORTHOPEDIC SURGERY | Facility: HOSPITAL | Age: 53
End: 2023-03-20
Payer: MEDICARE

## 2023-03-20 ENCOUNTER — TRANSCRIPTION ENCOUNTER (OUTPATIENT)
Age: 53
End: 2023-03-20

## 2023-03-20 ENCOUNTER — OUTPATIENT (OUTPATIENT)
Dept: OUTPATIENT SERVICES | Facility: HOSPITAL | Age: 53
LOS: 1 days | End: 2023-03-20
Payer: MEDICARE

## 2023-03-20 VITALS
TEMPERATURE: 99 F | RESPIRATION RATE: 15 BRPM | SYSTOLIC BLOOD PRESSURE: 118 MMHG | DIASTOLIC BLOOD PRESSURE: 62 MMHG | OXYGEN SATURATION: 98 % | HEIGHT: 62 IN | HEART RATE: 85 BPM | WEIGHT: 220.02 LBS

## 2023-03-20 VITALS
OXYGEN SATURATION: 100 % | RESPIRATION RATE: 18 BRPM | DIASTOLIC BLOOD PRESSURE: 87 MMHG | TEMPERATURE: 99 F | SYSTOLIC BLOOD PRESSURE: 107 MMHG | HEART RATE: 86 BPM

## 2023-03-20 DIAGNOSIS — Z98.89 OTHER SPECIFIED POSTPROCEDURAL STATES: Chronic | ICD-10-CM

## 2023-03-20 DIAGNOSIS — Z98.51 TUBAL LIGATION STATUS: Chronic | ICD-10-CM

## 2023-03-20 DIAGNOSIS — Z90.3 ACQUIRED ABSENCE OF STOMACH [PART OF]: Chronic | ICD-10-CM

## 2023-03-20 DIAGNOSIS — M54.12 RADICULOPATHY, CERVICAL REGION: ICD-10-CM

## 2023-03-20 DIAGNOSIS — Z90.710 ACQUIRED ABSENCE OF BOTH CERVIX AND UTERUS: Chronic | ICD-10-CM

## 2023-03-20 PROCEDURE — 62321 NJX INTERLAMINAR CRV/THRC: CPT

## 2023-03-20 NOTE — ASU PATIENT PROFILE, ADULT - NSICDXPASTSURGICALHX_GEN_ALL_CORE_FT
PAST SURGICAL HISTORY:  H/O gastric sleeve     History of hysterectomy     S/P appendectomy     S/P  section 1993    S/P rotator cuff repair Right shoulder ()    S/P thoracentesis     S/P tonsillectomy     S/P tubal ligation 10/1995

## 2023-04-27 ENCOUNTER — APPOINTMENT (OUTPATIENT)
Dept: RHEUMATOLOGY | Facility: CLINIC | Age: 53
End: 2023-04-27

## 2023-05-05 ENCOUNTER — APPOINTMENT (OUTPATIENT)
Dept: ORTHOPEDIC SURGERY | Facility: CLINIC | Age: 53
End: 2023-05-05

## 2023-06-09 ENCOUNTER — APPOINTMENT (OUTPATIENT)
Dept: GASTROENTEROLOGY | Facility: CLINIC | Age: 53
End: 2023-06-09

## 2023-07-06 ENCOUNTER — APPOINTMENT (OUTPATIENT)
Dept: RHEUMATOLOGY | Facility: CLINIC | Age: 53
End: 2023-07-06

## 2023-07-11 ENCOUNTER — APPOINTMENT (OUTPATIENT)
Dept: ORTHOPEDIC SURGERY | Facility: CLINIC | Age: 53
End: 2023-07-11
Payer: MEDICARE

## 2023-07-11 VITALS
SYSTOLIC BLOOD PRESSURE: 124 MMHG | HEIGHT: 62 IN | HEART RATE: 79 BPM | DIASTOLIC BLOOD PRESSURE: 79 MMHG | BODY MASS INDEX: 41.77 KG/M2 | WEIGHT: 227 LBS

## 2023-07-11 PROCEDURE — 99213 OFFICE O/P EST LOW 20 MIN: CPT | Mod: 25

## 2023-07-11 PROCEDURE — 20610 DRAIN/INJ JOINT/BURSA W/O US: CPT | Mod: RT

## 2023-07-11 NOTE — PHYSICAL EXAM
[de-identified] : General:\par Awake, alert, no acute distress, Patient was cooperative and appropriate during the examination.\par \par The patient is obese for height and age.\par \par Walks without an antalgic gait.\par \par Full, painless range of motion of the neck and back.\par \par Exam of the bilateral lower extremities is intact and symmetric with regards to dermatologic, vascular, and neurologic exam. Bilateral lower extremity sensation is grossly intact to light touch in the DP/SP/T/S/S nerve distributions. Intact DF/PF/EHL. BIlateral lower extremities warm and well-perfused with brisk capillary refill.\par \par \par Pulmonary:\par Regular, nonlabored breathing\par \par Abdomen:\par Soft, nontender, nondistended.\par \par Lymphatic:\par No evidence of axillary lymphadenopathy\par \par Right shoulder Exam:\par Physical exam of the shoulder demonstrates normal skin without signs of skin changes or abnormalities. No erythema, warmth, or joint effusion appreciated.\par \par Sensation intact light touch C5-T1\par Palpable radial pulse\par Radial/ulnar/median/axillary/musculocutaneous/AIN/PIN nerves grossly intact\par \par Range of motion:\par Forward Flexion: 90 active and passive limited by pain and guarding\par Abduction: 90 active and passive limited by pain and guarding\par External Rotation: 45\par Internal Rotation: L3\par \par Palpation:\par Not tender to palpation over the glenohumeral joint\par Moderately tender palpation over the rotator cuff insertion on the greater tuberosity\par Not tender to palpation over the AC joint\par Moderately tender to palpation of the biceps tendon/bicipital groove\par \par Strength testing:\par Supraspinatus: 4+/5 limited by pain and guarding\par Infraspinatus: 5/5\par Subscapularis: 5/5\par \par Special test:\par Empty can test positive\par Hernandez impingement test positive\par Speeds test positive\par [place]'s test negative\par Lift-off test negative\par Bell-press test negative\par Cross-arm adduction test negative\par \par  [de-identified] : MRI of the right shoulder from a [facility] imaging facility on 2/1/2023 was reviewed with patient today in the office.  The patient is supraspinatus tendinosis and chronic partial-thickness articular sided thinning/attenuation of the supraspinatus tendon at its insertion and may be secondary to chronic tearing and/or debridement.  No full-thickness retracted tendon tears.  Chondral wear of the posterior glenoid with adjacent degenerative blunting of the posterior superior to posterior glenoid labrum.  Subacromial and subdeltoid bursitis with overlying impingement upon the acromion.  The findings are similar to prior MRI.\par \par MRI of the right shoulder from 5/4/2021 and a [facility] imaging facility was reviewed the patient today in the office.  The patient has mild tendinosis/fraying of the anterior infraspinatus and posterior supraspinatus.  There is no discrete tear.  There is low-grade chondral thinning of the posterior glenoid.

## 2023-07-11 NOTE — PROCEDURE
[de-identified] : I injected the patient's right shoulder today with cortisone.\par \par I discussed at length with the patient the planned steroid and lidocaine injection. The risks, benefits, convalescence and alternatives were reviewed. The possible side effects discussed included but were not limited to: pain, swelling, heat, bleeding, and redness. Symptoms are generally mild but if they are extensive then contact the office. Giving pain relievers by mouth such as NSAIDs or Tylenol can generally treat the reactions to steroid and lidocaine. Rare cases of infection have been noted. Rash, hives and itching may occur post injection. If you have muscle pain or cramps, flushing and or swelling of the face, rapid heart beat, nausea, dizziness, fever, chills, headache, difficulty breathing, swelling in the arms or legs, or have a prickly feeling of your skin, contact a health care provider immediately. Following this discussion, the shoulder was prepped with Chlorhexidine and Alcohol and under sterile conditions the 80 mg Depo-Medrol and 4 cc Lidocaine injection was performed with a 22 gauge needle through a posterolateral injection site. The needle was introduced into the subacromial space and the medication was injected. Upon withdrawal of the needle the site was cleaned with alcohol and a band aid was applied. The patient tolerated the injection well and there were no adverse effects. Post injection instructions included no strenuous activity for 24 hours, cryotherapy and if there are any adverse effects to contact the office.
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
## 2023-07-11 NOTE — DISCUSSION/SUMMARY
[de-identified] : Assessment: 53-year-old female with right shoulder pain secondary to a partial rotator cuff tear\par \par Plan: I had a long discussion with the patient today regarding the nature of their diagnosis and treatment plan. We discussed the risks and benefits of no treatment as well as nonoperative and operative treatments.  I reviewed the patient's MRI today with her in the office which demonstrates tendinopathy and chronic partial-thickness tearing of the rotator cuff in the setting of subacromial impingement and early arthritis.  On examination she does have somewhat limited range of motion which is secondary to pain and guarding.  Her strength is otherwise reasonably good.  At this time I recommend conservative treatment of the patient's condition with modalities including rest, ice, heat, anti-inflammatory medications, activity modifications, and home stretching and strengthening exercises daily. I discussed with the patient the risks and benefits associated with NSAID use.  A repeat cortisone injection was administered the patient's right shoulder subacromial space today in the office under sterile conditions.  The patient tolerated this well and there are no adverse events.  Recommend follow-up in 8 weeks for repeat clinical assessment.\par \par The patient has undergone extensive conservative care for the shoulder in the form of anti-inflammatory medications, physical therapy, and 2 cortisone injections for the shoulder.  If she does fail conservative care we may consider discussing arthroscopic surgery although an MRI may be required for that point.\par \par The patient verbalizes their understanding and agrees with the plan.  All questions were answered to their satisfaction.

## 2023-07-18 ENCOUNTER — APPOINTMENT (OUTPATIENT)
Dept: RHEUMATOLOGY | Facility: CLINIC | Age: 53
End: 2023-07-18

## 2023-08-01 ENCOUNTER — APPOINTMENT (OUTPATIENT)
Dept: ORTHOPEDIC SURGERY | Facility: CLINIC | Age: 53
End: 2023-08-01
Payer: MEDICARE

## 2023-08-01 VITALS
HEART RATE: 73 BPM | DIASTOLIC BLOOD PRESSURE: 70 MMHG | HEIGHT: 62 IN | WEIGHT: 220 LBS | BODY MASS INDEX: 40.48 KG/M2 | SYSTOLIC BLOOD PRESSURE: 111 MMHG

## 2023-08-01 DIAGNOSIS — M47.812 SPONDYLOSIS W/OUT MYELOPATHY OR RADICULOPATHY, CERVICAL REGION: ICD-10-CM

## 2023-08-01 PROCEDURE — 72040 X-RAY EXAM NECK SPINE 2-3 VW: CPT

## 2023-08-01 PROCEDURE — 99214 OFFICE O/P EST MOD 30 MIN: CPT

## 2023-08-01 NOTE — HISTORY OF PRESENT ILLNESS
[de-identified] : Very pleasant 53-year-old female presents for surgical conversation with regard to cervical spondylosis and right upper extremity radiculopathy.  She has been evaluated by the shoulder service has internal derangement characteristics but now is more of a radicular type complaint.  X-rays will be obtained.  She has a previous MRI from earlier in 2023.  YEYO questionnaire is negative however there is positive complaints of radiculopathy.  She had has had 2 cervical injections with Dr.'s Crawford [Worsening] : worsening [___ mths] : [unfilled] month(s) ago [1] : a current pain level of 1/10 [10] : a maximum pain level of 10/10 [Bending] : worsened by bending [Lifting] : worsened by lifting [Rest] : relieved by rest [Ataxia] : no ataxia [Incontinence] : no incontinence [Loss of Dexterity] : good dexterity [Urinary Ret.] : no urinary retention

## 2023-08-01 NOTE — REVIEW OF SYSTEMS
[Joint Pain] : joint pain [Joint Stiffness] : joint stiffness [Joint Swelling] : joint swelling [Negative] : Heme/Lymph [FreeTextEntry9] : neck  pain

## 2023-08-01 NOTE — PHYSICAL EXAM
[Antalgic] : antalgic [de-identified] : CONSTITUTIONAL:  Patient is a very pleasant individual who is well-nourished and appears stated age.  PSYCHIATRIC:  Alert and oriented times three and in no apparent distress, and participates with orthopedic evaluation well. HEAD:  Atraumatic and  nonsyndromic in appearance. EENT: No thyromegaly, EOMI. RESPIRATORY:  Respiratory rate is regular, not dyspneic on examination. LYMPHATICS:  There is no cervical or axillary lymphadenopathy. INTEGUMENTARY:  Skin is clean, dry, and intact about the bilateral upper extremities and cervical spine.  VASCULAR:   There is brisk capillary refill about the bilateral upper extremities and radial pulses are 2/4.  NEUROLOGIC: Positive L'hirmitte, positive Spurling's sign. There are no pathologic reflexes. There is a decrease in sensation of the primarily the right  upper extremities on Wartenberg pinwheel examination.  Deep tendon reflexes are well-maintained at +2/4 of the bilateral upper extremities and are symmetric. MUSCULOSKELETAL:  There is no visible muscular atrophy.  Manual motor strength is well maintained in the bilateral upper extremities.  Cervical range of motion is well maintained, however it does contain a positive Spurling's and limits.  The patient ambulates in a non-myelopathic manner. Normal secondary orthopaedic exam of bilateral shoulders, elbows and hands.  Elbow flexion and extension, wrist extension, finger flexion and abduction are well maintained.    [de-identified] : X-rays of the cervical spine have been reviewed that shows loss of cervical lordosis shows mild C5-C6 cervical spondylosis 2 views cervical spine reviewed on August 1, 2023.  MRI of the cervical spine has been reviewed from [facility] imaging dated March 2023 demonstrates 4 5 and 5 6 cervical spondylosis 4 5 has moderate central stenosis as well as foraminal encroachment.

## 2023-08-08 ENCOUNTER — APPOINTMENT (OUTPATIENT)
Dept: ORTHOPEDIC SURGERY | Facility: CLINIC | Age: 53
End: 2023-08-08
Payer: MEDICARE

## 2023-08-08 VITALS
HEIGHT: 62 IN | HEART RATE: 77 BPM | DIASTOLIC BLOOD PRESSURE: 74 MMHG | SYSTOLIC BLOOD PRESSURE: 113 MMHG | WEIGHT: 220 LBS | BODY MASS INDEX: 40.48 KG/M2

## 2023-08-08 DIAGNOSIS — M54.12 RADICULOPATHY, CERVICAL REGION: ICD-10-CM

## 2023-08-08 PROCEDURE — 99213 OFFICE O/P EST LOW 20 MIN: CPT | Mod: 25

## 2023-08-08 PROCEDURE — 20610 DRAIN/INJ JOINT/BURSA W/O US: CPT | Mod: RT

## 2023-08-08 NOTE — PHYSICAL EXAM
[de-identified] : General: Awake, alert, no acute distress, Patient was cooperative and appropriate during the examination.  The patient is obese for height and age.  Walks without an antalgic gait.  Full, painless range of motion of the neck and back.  Exam of the bilateral lower extremities is intact and symmetric with regards to dermatologic, vascular, and neurologic exam. Bilateral lower extremity sensation is grossly intact to light touch in the DP/SP/T/S/S nerve distributions. Intact DF/PF/EHL. BIlateral lower extremities warm and well-perfused with brisk capillary refill.   Pulmonary: Regular, nonlabored breathing  Abdomen: Soft, nontender, nondistended.  Lymphatic: No evidence of axillary lymphadenopathy  Right shoulder Exam: Physical exam of the shoulder demonstrates normal skin without signs of skin changes or abnormalities. No erythema, warmth, or joint effusion appreciated.  Sensation intact light touch C5-T1 Palpable radial pulse Radial/ulnar/median/axillary/musculocutaneous/AIN/PIN nerves grossly intact  Range of motion: Forward Flexion: 90 active and passive limited by pain and guarding Abduction: 90 active and passive limited by pain and guarding External Rotation: 45 Internal Rotation: L3  Palpation: Not tender to palpation over the glenohumeral joint Moderately tender palpation over the rotator cuff insertion on the greater tuberosity Moderately tender to palpation over the AC joint Moderately tender to palpation of the biceps tendon/bicipital groove Patient is also globally tender to palpation about the periscapular muscles, cervical paraspinal musculature, trapezial musculature biceps, triceps, and forearm on the ipsilateral side.  Strength testing: Supraspinatus: 4+/5 limited by pain and guarding Infraspinatus: 5/5 Subscapularis: 5/5  Special test: Empty can test positive Hernandez impingement test positive Speeds test positive [place]'s test negative Lift-off test negative Bell-press test negative Cross-arm adduction test negative   [de-identified] : MRI of the right shoulder from a [facility] imaging facility on 2/1/2023 was reviewed with patient today in the office.  The patient is supraspinatus tendinosis and chronic partial-thickness articular sided thinning/attenuation of the supraspinatus tendon at its insertion and may be secondary to chronic tearing and/or debridement.  No full-thickness retracted tendon tears.  Chondral wear of the posterior glenoid with adjacent degenerative blunting of the posterior superior to posterior glenoid labrum.  Subacromial and subdeltoid bursitis with overlying impingement upon the acromion.  The findings are similar to prior MRI.\par  \par  MRI of the right shoulder from 5/4/2021 and a [facility] imaging facility was reviewed the patient today in the office.  The patient has mild tendinosis/fraying of the anterior infraspinatus and posterior supraspinatus.  There is no discrete tear.  There is low-grade chondral thinning of the posterior glenoid.

## 2023-08-08 NOTE — DISCUSSION/SUMMARY
[de-identified] : Assessment: 53-year-old female with right shoulder pain secondary to a partial rotator cuff tear in the setting of fibromyalgia and right upper extremity radiculopathy  Plan: I had a long discussion with the patient today regarding the nature of their diagnosis and treatment plan. We discussed the risks and benefits of no treatment as well as nonoperative and operative treatments.  I reviewed the patient's MRI today with her in the office which demonstrates tendinopathy and chronic partial-thickness tearing of the rotator cuff in the setting of subacromial impingement and early arthritis.  On examination the patient maintains significant tenderness palpation extending from her neck down to her arm and forearm.  This does appear to be myofascial related pain and likely related to fibromyalgia.  She does have significant tenderness over the AC joint today.  She maintains some limited range of motion which is partially limited to guarding and pain.  She has undergone extensive conservative care in the form of anti-inflammatory medications, physical therapy, and 2 subacromial injections without any relief.  She notes that the injections that she has previously had have not helped her at all.  We did discuss possible surgical intervention including a right shoulder arthroscopy with debridement of partial rotator cuff tear, subacromial decompression, synovectomy and debridement, and possible biceps tenodesis.  Discussed the risks and benefits of surgery.  The patient's diffuse, abdominal pain explained that I do not feel that a surgical arthroscopy is likely to cure all of her symptoms and I would expect her to have some degree of pain and discomfort even following her shoulder surgery.  The patient is not interested in any surgery at this point.  She would like to continue with conservative care.  A new referral for physical therapy was provided today.  She was also provided with a new referral to see rheumatologist regarding her fibromyalgia since her current rheumatologist is very far away and difficult to get to.  A cortisone injection was administered to the patient's right shoulder AC joint today in the office under sterile conditions.  The patient tolerated this well and there are no adverse events.  Recommend follow-up in 3 months for repeat clinical assessment.  The patient verbalizes her understanding and agrees with the plan.  All questions were answered to her satisfaction.

## 2023-08-08 NOTE — PROCEDURE
[de-identified] : I injected the patient's right shoulder AC Joint today with cortisone.  I discussed at length with the patient the planned steroid and lidocaine injection. The risks, benefits, convalescence and alternatives were reviewed. The possible side effects discussed included but were not limited to: pain, swelling, heat, bleeding, and redness. Symptoms are generally mild but if they are extensive then contact the office. Giving pain relievers by mouth such as NSAIDs or Tylenol can generally treat the reactions to steroid and lidocaine. Rare cases of infection have been noted. Rash, hives and itching may occur post injection. If you have muscle pain or cramps, flushing and or swelling of the face, rapid heart beat, nausea, dizziness, fever, chills, headache, difficulty breathing, swelling in the arms or legs, or have a prickly feeling of your skin, contact a health care provider immediately. Following this discussion, the shoulder was prepped with Chlorhexidine and Alcohol and under sterile conditions the 40 mg Depo-Medrol and 1 cc Lidocaine injection was performed with a 22 gauge needle through a direct superior injection site. The needle was introduced into the AC Joint space and the medication was injected. Upon withdrawal of the needle the site was cleaned with alcohol and a band aid was applied. The patient tolerated the injection well and there were no adverse effects. Post injection instructions included no strenuous activity for 24 hours, cryotherapy and if there are any adverse effects to contact the office.
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
## 2023-08-15 ENCOUNTER — APPOINTMENT (OUTPATIENT)
Dept: INTERNAL MEDICINE | Facility: CLINIC | Age: 53
End: 2023-08-15
Payer: MEDICARE

## 2023-08-15 VITALS
DIASTOLIC BLOOD PRESSURE: 78 MMHG | HEIGHT: 62 IN | WEIGHT: 224 LBS | BODY MASS INDEX: 41.22 KG/M2 | SYSTOLIC BLOOD PRESSURE: 114 MMHG | HEART RATE: 65 BPM

## 2023-08-15 DIAGNOSIS — Z13.21 ENCOUNTER FOR SCREENING FOR NUTRITIONAL DISORDER: ICD-10-CM

## 2023-08-15 DIAGNOSIS — Z13.228 ENCOUNTER FOR SCREENING FOR OTHER SUSPECTED ENDOCRINE DISORDER: ICD-10-CM

## 2023-08-15 DIAGNOSIS — Z13.0 ENCOUNTER FOR SCREENING FOR DISEASES OF THE BLOOD AND BLOOD-FORMING ORGANS AND CERTAIN DISORDERS INVOLVING THE IMMUNE MECHANISM: ICD-10-CM

## 2023-08-15 DIAGNOSIS — M79.7 FIBROMYALGIA: ICD-10-CM

## 2023-08-15 DIAGNOSIS — Z13.0 ENCOUNTER FOR SCREENING FOR OTHER SUSPECTED ENDOCRINE DISORDER: ICD-10-CM

## 2023-08-15 DIAGNOSIS — R63.5 ABNORMAL WEIGHT GAIN: ICD-10-CM

## 2023-08-15 DIAGNOSIS — Z13.29 ENCOUNTER FOR SCREENING FOR OTHER SUSPECTED ENDOCRINE DISORDER: ICD-10-CM

## 2023-08-15 DIAGNOSIS — M25.511 PAIN IN RIGHT SHOULDER: ICD-10-CM

## 2023-08-15 DIAGNOSIS — Z71.89 OTHER SPECIFIED COUNSELING: ICD-10-CM

## 2023-08-15 DIAGNOSIS — F41.1 GENERALIZED ANXIETY DISORDER: ICD-10-CM

## 2023-08-15 PROCEDURE — 99214 OFFICE O/P EST MOD 30 MIN: CPT

## 2023-08-15 RX ORDER — CYCLOBENZAPRINE HYDROCHLORIDE 10 MG/1
10 TABLET, FILM COATED ORAL
Qty: 60 | Refills: 2 | Status: DISCONTINUED | COMMUNITY
Start: 2021-01-05 | End: 2023-08-15

## 2023-08-15 RX ORDER — GABAPENTIN 300 MG/1
300 CAPSULE ORAL
Qty: 480 | Refills: 1 | Status: DISCONTINUED | COMMUNITY
Start: 2020-07-14 | End: 2023-08-15

## 2023-08-15 RX ORDER — NAPROXEN 500 MG/1
500 TABLET ORAL
Qty: 60 | Refills: 0 | Status: DISCONTINUED | COMMUNITY
Start: 2023-01-30 | End: 2023-08-15

## 2023-08-15 NOTE — ASSESSMENT
[FreeTextEntry1] : fibromyalgia renew meds migraine renew meds restart tompamax see Bariatrics for consideration of converting a sleeve gastrectomy to gastric bypass work on diet and exercise renew meds for her frozen shoulder

## 2023-08-15 NOTE — HISTORY OF PRESENT ILLNESS
[FreeTextEntry1] : follow up fibromyalgia [de-identified] : follow up fibromyalgia,  needs migraine meds also intersted in converting her sleeve gastrectomy to gastric bypass

## 2023-08-17 ENCOUNTER — APPOINTMENT (OUTPATIENT)
Dept: SURGERY | Facility: CLINIC | Age: 53
End: 2023-08-17
Payer: MEDICARE

## 2023-08-17 VITALS
WEIGHT: 220.5 LBS | BODY MASS INDEX: 40.58 KG/M2 | TEMPERATURE: 98.6 F | RESPIRATION RATE: 14 BRPM | OXYGEN SATURATION: 97 % | DIASTOLIC BLOOD PRESSURE: 73 MMHG | HEART RATE: 92 BPM | SYSTOLIC BLOOD PRESSURE: 128 MMHG | HEIGHT: 62 IN

## 2023-08-17 PROCEDURE — 99204 OFFICE O/P NEW MOD 45 MIN: CPT

## 2023-08-17 NOTE — ASSESSMENT
[FreeTextEntry1] : Ms. HOOD is a 53 year old woman with morbid obesity status post sleeve gastrectomy in 2018 who is unable to lose weight and lower her risk of co-morbid conditions with medical management including diet and exercise. She wishes to proceed with planning for revisional bariatric surgery.

## 2023-08-17 NOTE — CONSULT LETTER
[Dear  ___] : Dear  [unfilled], [Consult Letter:] : I had the pleasure of evaluating your patient, [unfilled]. [Please see my note below.] : Please see my note below. [Consult Closing:] : Thank you very much for allowing me to participate in the care of this patient.  If you have any questions, please do not hesitate to contact me. [Sincerely,] : Sincerely, [FreeTextEntry3] : Jase Candelario MD, FACS Director of Bariatric Surgery [facility]  Assistant Professor of Surgery  [employer] at [facility]

## 2023-08-17 NOTE — HISTORY OF PRESENT ILLNESS
[de-identified] : Ms. HOOD is a 53 year old morbidly obese woman,5'2" and 220.8 pounds (BMI 40.33) status post sleeve gastrectomy (Dr. Chowdhury, 2018), who presents requesting revisional bariatric surgery. She reports preop weight 235, low postop weight 175, current weight 220. She reports weight hovering at ~220 despite adherence to recommended diet and exercise.   The patient's history includes prediabetes and obstructive sleep apnea, which resolved after sleeve gastrectomy.   denies reflux denies smoking - quit in 2012. we discussed importance of continued smoking cessation, especially if conversion to gastric bypass would be performed. We discussed risks of ulcer, perforation, sepsis, and death related to nicotine after gastric bypass. Ms Hood is committed to zero nicotine intake for the rest of her life.

## 2023-08-17 NOTE — PLAN
[FreeTextEntry1] : The risks, benefits and alternatives of laparoscopic/robotic revision of sleeve gastrectomy versus conversion of sleeve gastrectomy to gastric bypass were discussed at length and all questions were answered. The patient appears to understand and wishes to proceed. Plan for conversion of sleeve gastrectomy to alexi-en-Y gastric bypass.   The patient was given the following instructions:   1.   She needs a complete medical evaluation including cardiac evaluation.   2.   She needs an upper endoscopy and upper GI x-ray.   3.   She needs dietary and psychological evaluations.  4.   She must undergo a right upper abdominal sonogram.   The patient clearly understood that surgery would only be scheduled if there are no medical or psychiatric contraindications, and a second office visit is required.

## 2023-08-17 NOTE — CONSULT LETTER
[Dear  ___] : Dear  [unfilled], [Consult Letter:] : I had the pleasure of evaluating your patient, [unfilled]. [Please see my note below.] : Please see my note below. [Consult Closing:] : Thank you very much for allowing me to participate in the care of this patient.  If you have any questions, please do not hesitate to contact me. [Sincerely,] : Sincerely, [FreeTextEntry3] : Jase Candelario MD, FACS Director of Bariatric Surgery [facility]  Assistant Professor of Surgery  [employer] at [facility]

## 2023-08-17 NOTE — REASON FOR VISIT
[Consultation] : a consultation visit [Initial Consult] : an initial consult for [Morbid Obesity (BMI>40)] : morbid obesity (bmi>40)

## 2023-08-17 NOTE — HISTORY OF PRESENT ILLNESS
[de-identified] : Ms. HOOD is a 53 year old morbidly obese woman,5'2" and 220.8 pounds (BMI 40.33) status post sleeve gastrectomy (Dr. Chowdhury, 2018), who presents requesting revisional bariatric surgery. She reports preop weight 235, low postop weight 175, current weight 220. She reports weight hovering at ~220 despite adherence to recommended diet and exercise.   The patient's history includes prediabetes and obstructive sleep apnea, which resolved after sleeve gastrectomy.   denies reflux denies smoking - quit in 2012. we discussed importance of continued smoking cessation, especially if conversion to gastric bypass would be performed. We discussed risks of ulcer, perforation, sepsis, and death related to nicotine after gastric bypass. Ms Hood is committed to zero nicotine intake for the rest of her life.

## 2023-08-20 NOTE — HISTORY OF PRESENT ILLNESS
[FreeTextEntry1] : 53F h/o migraine, morbid obesity (BMI 40 despite gastric sleeve 2018) now planning for revisional bariatric surgery, presents for preop. cardiac risk evaluation.   Recent lab , TG 65, HDL 60,  A1c 6.0

## 2023-08-21 ENCOUNTER — APPOINTMENT (OUTPATIENT)
Dept: CARDIOLOGY | Facility: CLINIC | Age: 53
End: 2023-08-21

## 2023-08-25 ENCOUNTER — OUTPATIENT (OUTPATIENT)
Dept: OUTPATIENT SERVICES | Facility: HOSPITAL | Age: 53
LOS: 1 days | End: 2023-08-25
Payer: MEDICARE

## 2023-08-25 ENCOUNTER — APPOINTMENT (OUTPATIENT)
Dept: ULTRASOUND IMAGING | Facility: CLINIC | Age: 53
End: 2023-08-25
Payer: MEDICARE

## 2023-08-25 DIAGNOSIS — Z98.89 OTHER SPECIFIED POSTPROCEDURAL STATES: Chronic | ICD-10-CM

## 2023-08-25 DIAGNOSIS — Z98.51 TUBAL LIGATION STATUS: Chronic | ICD-10-CM

## 2023-08-25 DIAGNOSIS — Z13.0 ENCOUNTER FOR SCREENING FOR DISEASES OF THE BLOOD AND BLOOD-FORMING ORGANS AND CERTAIN DISORDERS INVOLVING THE IMMUNE MECHANISM: ICD-10-CM

## 2023-08-25 DIAGNOSIS — Z90.710 ACQUIRED ABSENCE OF BOTH CERVIX AND UTERUS: Chronic | ICD-10-CM

## 2023-08-25 DIAGNOSIS — Z90.3 ACQUIRED ABSENCE OF STOMACH [PART OF]: Chronic | ICD-10-CM

## 2023-08-25 DIAGNOSIS — Z71.89 OTHER SPECIFIED COUNSELING: ICD-10-CM

## 2023-08-25 DIAGNOSIS — Z13.21 ENCOUNTER FOR SCREENING FOR NUTRITIONAL DISORDER: ICD-10-CM

## 2023-08-25 DIAGNOSIS — Z00.8 ENCOUNTER FOR OTHER GENERAL EXAMINATION: ICD-10-CM

## 2023-08-25 PROCEDURE — 76700 US EXAM ABDOM COMPLETE: CPT

## 2023-08-25 PROCEDURE — 76700 US EXAM ABDOM COMPLETE: CPT | Mod: 26

## 2023-09-01 ENCOUNTER — APPOINTMENT (OUTPATIENT)
Dept: BARIATRICS | Facility: CLINIC | Age: 53
End: 2023-09-01
Payer: MEDICARE

## 2023-09-01 VITALS
DIASTOLIC BLOOD PRESSURE: 73 MMHG | WEIGHT: 217.25 LBS | RESPIRATION RATE: 14 BRPM | HEART RATE: 81 BPM | TEMPERATURE: 97.9 F | HEIGHT: 62 IN | BODY MASS INDEX: 39.98 KG/M2 | SYSTOLIC BLOOD PRESSURE: 108 MMHG | OXYGEN SATURATION: 97 %

## 2023-09-01 PROCEDURE — 97802 MEDICAL NUTRITION INDIV IN: CPT

## 2023-09-07 ENCOUNTER — APPOINTMENT (OUTPATIENT)
Dept: GASTROENTEROLOGY | Facility: CLINIC | Age: 53
End: 2023-09-07
Payer: MEDICARE

## 2023-09-07 VITALS
HEART RATE: 81 BPM | SYSTOLIC BLOOD PRESSURE: 115 MMHG | DIASTOLIC BLOOD PRESSURE: 77 MMHG | RESPIRATION RATE: 14 BRPM | BODY MASS INDEX: 39.93 KG/M2 | HEIGHT: 62 IN | OXYGEN SATURATION: 98 % | WEIGHT: 217 LBS

## 2023-09-07 DIAGNOSIS — Z13.9 ENCOUNTER FOR SCREENING, UNSPECIFIED: ICD-10-CM

## 2023-09-07 PROCEDURE — 99203 OFFICE O/P NEW LOW 30 MIN: CPT

## 2023-09-07 NOTE — ASSESSMENT
[FreeTextEntry1] : 53-year-old female with history of obesity status post gastric sleeve who presents for colon cancer screening  Average risk colon cancer screening Colonoscopy procedure and instructions reviewed with patient Bowel prep sent to pharmacy

## 2023-09-07 NOTE — PHYSICAL EXAM
[Alert] : alert [Normal Voice/Communication] : normal voice/communication [No Acute Distress] : no acute distress [Sclera] : the sclera and conjunctiva were normal [Hearing Threshold Finger Rub Not [place]] : hearing was normal [Normal Lips/Gums] : the lips and gums were normal [Oropharynx] : the oropharynx was normal [Normal Appearance] : the appearance of the neck was normal [No Neck Mass] : no neck mass was observed [No Respiratory Distress] : no respiratory distress [No Acc Muscle Use] : no accessory muscle use [Respiration, Rhythm And Depth] : normal respiratory rhythm and effort [Auscultation Breath Sounds / Voice Sounds] : lungs were clear to auscultation bilaterally [None] : no edema [Bowel Sounds] : normal bowel sounds [Abdomen Tenderness] : non-tender [No Masses] : no abdominal mass palpated [Abdomen Soft] : soft [Cervical Lymph Nodes Enlarged Posterior Bilaterally] : no posterior cervical lymphadenopathy [Supraclavicular Lymph Nodes Enlarged Bilaterally] : no supraclavicular lymphadenopathy [Cervical Lymph Nodes Enlarged Anterior Bilaterally] : no anterior cervical lymphadenopathy [No CVA Tenderness] : no CVA  tenderness [No Spinal Tenderness] : no spinal tenderness [Abnormal Walk] : normal gait [Normal Color / Pigmentation] : normal skin color and pigmentation [No Focal Deficits] : no focal deficits [Oriented To Time, Place, And Person] : oriented to person, place, and time [Obese (BMI >= 30)] : obese (BMI >= 30)

## 2023-09-07 NOTE — REVIEW OF SYSTEMS
[Negative] : Heme/Lymph [Fever] : no fever [Chills] : no chills [Feeling Tired] : not feeling tired [Recent Weight Loss (___ Lbs)] : no recent weight loss [Abdominal Pain] : no abdominal pain [Vomiting] : no vomiting [Constipation] : no constipation [Diarrhea] : no diarrhea [Heartburn] : no heartburn [Melena (black stool)] : no melena [Bleeding] : no bleeding [Bloating (gassiness)] : no bloating

## 2023-09-07 NOTE — HISTORY OF PRESENT ILLNESS
[FreeTextEntry1] : 53-year-old female with a history of obesity status post sleeve gastrectomy who presents for colon cancer screening.  She was referred by her primary care provider.  She states her last colonoscopy was performed in 2017.  She states it was negative.  She denies family history of colon cancer. She denies unintentional weight loss, nausea, vomiting, abdominal pain, melena or rectal bleeding. She denies tobacco use.

## 2023-10-02 ENCOUNTER — APPOINTMENT (OUTPATIENT)
Dept: NEUROLOGY | Facility: CLINIC | Age: 53
End: 2023-10-02
Payer: MEDICARE

## 2023-10-02 VITALS — BODY MASS INDEX: 39.56 KG/M2 | HEIGHT: 62 IN | WEIGHT: 215 LBS

## 2023-10-02 PROCEDURE — 99214 OFFICE O/P EST MOD 30 MIN: CPT

## 2023-10-02 RX ORDER — SUMATRIPTAN 100 MG/1
100 TABLET, FILM COATED ORAL
Qty: 9 | Refills: 0 | Status: DISCONTINUED | COMMUNITY
End: 2023-10-02

## 2023-10-05 ENCOUNTER — APPOINTMENT (OUTPATIENT)
Dept: CARDIOLOGY | Facility: CLINIC | Age: 53
End: 2023-10-05
Payer: MEDICARE

## 2023-10-05 VITALS — DIASTOLIC BLOOD PRESSURE: 68 MMHG | SYSTOLIC BLOOD PRESSURE: 110 MMHG

## 2023-10-05 VITALS
SYSTOLIC BLOOD PRESSURE: 106 MMHG | DIASTOLIC BLOOD PRESSURE: 70 MMHG | HEIGHT: 62 IN | HEART RATE: 88 BPM | BODY MASS INDEX: 39.93 KG/M2 | WEIGHT: 217 LBS | OXYGEN SATURATION: 99 %

## 2023-10-05 PROCEDURE — 93000 ELECTROCARDIOGRAM COMPLETE: CPT

## 2023-10-05 PROCEDURE — 99203 OFFICE O/P NEW LOW 30 MIN: CPT | Mod: 25

## 2023-10-05 RX ORDER — SODIUM SULFATE, POTASSIUM SULFATE AND MAGNESIUM SULFATE 1.6; 3.13; 17.5 G/177ML; G/177ML; G/177ML
17.5-3.13-1.6 SOLUTION ORAL
Qty: 2 | Refills: 0 | Status: DISCONTINUED | COMMUNITY
Start: 2023-09-07 | End: 2023-10-05

## 2023-10-05 RX ORDER — RIZATRIPTAN BENZOATE 10 MG/1
10 TABLET ORAL
Qty: 9 | Refills: 5 | Status: DISCONTINUED | COMMUNITY
Start: 2023-10-02 | End: 2023-10-05

## 2023-10-06 ENCOUNTER — APPOINTMENT (OUTPATIENT)
Dept: BARIATRICS | Facility: CLINIC | Age: 53
End: 2023-10-06
Payer: MEDICARE

## 2023-10-06 VITALS
DIASTOLIC BLOOD PRESSURE: 68 MMHG | RESPIRATION RATE: 14 BRPM | HEIGHT: 62 IN | OXYGEN SATURATION: 97 % | WEIGHT: 216.38 LBS | HEART RATE: 93 BPM | TEMPERATURE: 97.9 F | SYSTOLIC BLOOD PRESSURE: 104 MMHG | BODY MASS INDEX: 39.82 KG/M2

## 2023-10-06 PROCEDURE — 97803 MED NUTRITION INDIV SUBSEQ: CPT

## 2023-11-01 ENCOUNTER — TRANSCRIPTION ENCOUNTER (OUTPATIENT)
Age: 53
End: 2023-11-01

## 2023-11-02 ENCOUNTER — APPOINTMENT (OUTPATIENT)
Dept: GASTROENTEROLOGY | Facility: GI CENTER | Age: 53
End: 2023-11-02
Payer: MEDICARE

## 2023-11-02 ENCOUNTER — OUTPATIENT (OUTPATIENT)
Dept: OUTPATIENT SERVICES | Facility: HOSPITAL | Age: 53
LOS: 1 days | End: 2023-11-02
Payer: MEDICARE

## 2023-11-02 DIAGNOSIS — Z98.89 OTHER SPECIFIED POSTPROCEDURAL STATES: Chronic | ICD-10-CM

## 2023-11-02 DIAGNOSIS — Z98.51 TUBAL LIGATION STATUS: Chronic | ICD-10-CM

## 2023-11-02 DIAGNOSIS — Z12.11 ENCOUNTER FOR SCREENING FOR MALIGNANT NEOPLASM OF COLON: ICD-10-CM

## 2023-11-02 DIAGNOSIS — Z90.3 ACQUIRED ABSENCE OF STOMACH [PART OF]: Chronic | ICD-10-CM

## 2023-11-02 DIAGNOSIS — Z90.710 ACQUIRED ABSENCE OF BOTH CERVIX AND UTERUS: Chronic | ICD-10-CM

## 2023-11-02 PROCEDURE — 45378 DIAGNOSTIC COLONOSCOPY: CPT | Mod: PT

## 2023-11-02 PROCEDURE — G0121: CPT

## 2023-11-03 ENCOUNTER — APPOINTMENT (OUTPATIENT)
Dept: CARDIOLOGY | Facility: CLINIC | Age: 53
End: 2023-11-03

## 2023-11-06 ENCOUNTER — APPOINTMENT (OUTPATIENT)
Dept: BARIATRICS | Facility: CLINIC | Age: 53
End: 2023-11-06
Payer: MEDICARE

## 2023-11-06 VITALS
WEIGHT: 211.4 LBS | BODY MASS INDEX: 38.9 KG/M2 | HEART RATE: 80 BPM | OXYGEN SATURATION: 96 % | SYSTOLIC BLOOD PRESSURE: 113 MMHG | TEMPERATURE: 98.5 F | RESPIRATION RATE: 14 BRPM | HEIGHT: 62 IN | DIASTOLIC BLOOD PRESSURE: 74 MMHG

## 2023-11-06 PROCEDURE — 97803 MED NUTRITION INDIV SUBSEQ: CPT

## 2023-11-08 ENCOUNTER — APPOINTMENT (OUTPATIENT)
Dept: CARDIOLOGY | Facility: CLINIC | Age: 53
End: 2023-11-08

## 2023-11-15 ENCOUNTER — APPOINTMENT (OUTPATIENT)
Dept: CARDIOLOGY | Facility: CLINIC | Age: 53
End: 2023-11-15

## 2023-12-13 ENCOUNTER — APPOINTMENT (OUTPATIENT)
Dept: CARDIOLOGY | Facility: CLINIC | Age: 53
End: 2023-12-13
Payer: MEDICARE

## 2023-12-13 PROCEDURE — 93306 TTE W/DOPPLER COMPLETE: CPT

## 2023-12-20 ENCOUNTER — APPOINTMENT (OUTPATIENT)
Dept: CARDIOLOGY | Facility: CLINIC | Age: 53
End: 2023-12-20
Payer: MEDICARE

## 2023-12-20 PROCEDURE — 93015 CV STRESS TEST SUPVJ I&R: CPT

## 2023-12-20 NOTE — DISCUSSION/SUMMARY
[FreeTextEntry1] : 1 preoperative cardiovascular evaluation: Bariatric surgery An exercise stress test was ordered to rule out prohibitive in view of the patient's age, obesity, myocardial ischemia and inactivity. If her ischemic evaluation is unremarkable, she will be instructed to initiate an aerobic exercise program. 2 murmur An echocardiogram was ordered to rule out a cardiomyopathy or valvular abnormality as the etiology of her murmur  TTM  12/6/2023 No-show for exercise stress test and echocardiogram

## 2023-12-20 NOTE — HISTORY OF PRESENT ILLNESS
[FreeTextEntry1] : The patient is a 53-year-old black female with a past medical history remarkable for obesity who presents to the office for evaluation prior to bariatric surgery. The patient does not exercise.  Her cholesterol from February 17 is acceptable.

## 2023-12-20 NOTE — CARDIOLOGY SUMMARY
[de-identified] : Normal sinus rhythm [de-identified] : - Obesity: BMI 39 - ECHOCARDIOGRAM: 12/14/2023, EF 60 to 65%, trace mitral regurgitation -EXERCISE STRESS TEST: 12/20/2023, normal, poor exercise tolerance, [facility]

## 2024-01-23 ENCOUNTER — APPOINTMENT (OUTPATIENT)
Dept: CARDIOLOGY | Facility: CLINIC | Age: 54
End: 2024-01-23
Payer: MEDICARE

## 2024-01-23 DIAGNOSIS — R01.1 CARDIAC MURMUR, UNSPECIFIED: ICD-10-CM

## 2024-01-23 DIAGNOSIS — Z01.810 ENCOUNTER FOR PREPROCEDURAL CARDIOVASCULAR EXAMINATION: ICD-10-CM

## 2024-01-23 PROCEDURE — 99441: CPT

## 2024-01-23 NOTE — REASON FOR VISIT
[FreeTextEntry1] : Telephone telemedicine visit  chief complaint: Preoperative cardiovascular evaluation

## 2024-01-23 NOTE — CARDIOLOGY SUMMARY
[de-identified] : - Obesity: BMI 39 - ECHOCARDIOGRAM: 12/14/2023, EF 60 to 65%, trace mitral regurgitation -EXERCISE STRESS TEST: 12/20/2023, normal, poor exercise tolerance, [facility]

## 2024-01-23 NOTE — DISCUSSION/SUMMARY
[FreeTextEntry1] : 1 preoperative cardiovascular evaluation: Bariatric surgery In view of the patient's normal ejection fraction, normal stress test, adequate functional capacity, and the absence of unstable angina, congestive heart failure, or severe aortic stenosis, she is at average risk for perioperative cardiovascular morbidity and mortality, and cleared from a cardiology standpoint with the following recommendations: Perioperative beta-blockers, endocarditis prophylaxis, and a postoperative monitored bed are not indicated. DVT prophylaxis at the surgeon's discretion.   2 murmur Physiologic murmur demonstrated by echocardiography   3 poor exercise tolerance We discussed diet, exercise, and weight loss  Ms. PHOENIX HOOD was instructed to follow-up in your office for continued blood pressure and cholesterol monitoring . The patient will return to this office as needed

## 2024-01-23 NOTE — HISTORY OF PRESENT ILLNESS
[FreeTextEntry1] : The patient is a 53-year-old black female with a past medical history remarkable for obesity who presented to the office for evaluation prior to bariatric surgery. Echocardiography demonstrated a normal ejection fraction and the absence of valve disease.  Her exercise stress test was normal. The patient does not exercise.  Her cholesterol from February 17 is acceptable. I reviewed the results with the patient.  Her questions were answered.  We spoke on the phone for 5 minutes.  She consented to a telephone telemedicine visit

## 2024-01-25 ENCOUNTER — APPOINTMENT (OUTPATIENT)
Dept: CARDIOLOGY | Facility: CLINIC | Age: 54
End: 2024-01-25

## 2024-01-29 ENCOUNTER — APPOINTMENT (OUTPATIENT)
Dept: PAIN MANAGEMENT | Facility: CLINIC | Age: 54
End: 2024-01-29
Payer: MEDICARE

## 2024-01-29 DIAGNOSIS — M54.16 RADICULOPATHY, LUMBAR REGION: ICD-10-CM

## 2024-01-29 PROCEDURE — 99213 OFFICE O/P EST LOW 20 MIN: CPT | Mod: 95

## 2024-01-29 NOTE — ASSESSMENT
[FreeTextEntry1] : Interim history The patient returns with pain that has been treated adequately in the past with epidural steroid injections.  The patient's complaints are consistent with radiculopathy.  The patient's pain is emanating from the lower back and radiating down both legs.  Patient also suffers incontinence when the pain becomes significant.  This is been an ongoing issue and has been adequately addressed with epidural steroid injections in the past.  Patient's ADL's increase with prior MIKE.   The last injection gave greater than 60% reduction of the pain but now there is a return of symptoms. The patient is requesting a subsequent epidural steroid injection to alleviate pain and improve functional ability and quality of life.  Patient is a candidate. Objective findings Since the last visit, there have been no new imaging studies. The patient has no new symptoms except the return of the their pain complaints. Motor and sensory function is unchanged. Plan The patient will obtain a new MRI of the lumbar spine since been more than 3 years since the last MRI.  Patient will also have a neurologic consult after the MRI to confirm that he is okay to proceed with the epidural steroid injections in the presence of the incontinence.  Once we have that information the patient will be scheduled for a subsequent epidural steroid injection.  Spoke to patient about epidural steroid injections. Explained risks, benefits and alternatives including but not limited to the risk of infection, bleeding, headache, syncopal episode, failure to resolve issues, allergic reaction, symptom recurrence, allergic reaction, nerve injury, and increased pain. The patient understands the risks. All questions were answered. The patient is willing to proceed.

## 2024-02-08 ENCOUNTER — OUTPATIENT (OUTPATIENT)
Dept: OUTPATIENT SERVICES | Facility: HOSPITAL | Age: 54
LOS: 1 days | End: 2024-02-08

## 2024-02-08 ENCOUNTER — APPOINTMENT (OUTPATIENT)
Dept: MRI IMAGING | Facility: CLINIC | Age: 54
End: 2024-02-08
Payer: MEDICARE

## 2024-02-08 ENCOUNTER — RESULT REVIEW (OUTPATIENT)
Age: 54
End: 2024-02-08

## 2024-02-08 DIAGNOSIS — Z90.710 ACQUIRED ABSENCE OF BOTH CERVIX AND UTERUS: Chronic | ICD-10-CM

## 2024-02-08 DIAGNOSIS — M54.16 RADICULOPATHY, LUMBAR REGION: ICD-10-CM

## 2024-02-08 DIAGNOSIS — Z98.89 OTHER SPECIFIED POSTPROCEDURAL STATES: Chronic | ICD-10-CM

## 2024-02-08 DIAGNOSIS — Z90.3 ACQUIRED ABSENCE OF STOMACH [PART OF]: Chronic | ICD-10-CM

## 2024-02-08 DIAGNOSIS — Z98.51 TUBAL LIGATION STATUS: Chronic | ICD-10-CM

## 2024-02-08 PROCEDURE — 72148 MRI LUMBAR SPINE W/O DYE: CPT | Mod: 26

## 2024-02-23 ENCOUNTER — APPOINTMENT (OUTPATIENT)
Dept: INTERNAL MEDICINE | Facility: CLINIC | Age: 54
End: 2024-02-23

## 2024-02-26 ENCOUNTER — OUTPATIENT (OUTPATIENT)
Dept: OUTPATIENT SERVICES | Facility: HOSPITAL | Age: 54
LOS: 1 days | End: 2024-02-26
Payer: MEDICARE

## 2024-02-26 VITALS
SYSTOLIC BLOOD PRESSURE: 130 MMHG | RESPIRATION RATE: 20 BRPM | OXYGEN SATURATION: 99 % | TEMPERATURE: 97 F | WEIGHT: 209.66 LBS | DIASTOLIC BLOOD PRESSURE: 70 MMHG | HEART RATE: 83 BPM | HEIGHT: 62 IN

## 2024-02-26 DIAGNOSIS — Z90.710 ACQUIRED ABSENCE OF BOTH CERVIX AND UTERUS: Chronic | ICD-10-CM

## 2024-02-26 DIAGNOSIS — Z01.818 ENCOUNTER FOR OTHER PREPROCEDURAL EXAMINATION: ICD-10-CM

## 2024-02-26 DIAGNOSIS — Z98.89 OTHER SPECIFIED POSTPROCEDURAL STATES: Chronic | ICD-10-CM

## 2024-02-26 DIAGNOSIS — E66.01 MORBID (SEVERE) OBESITY DUE TO EXCESS CALORIES: ICD-10-CM

## 2024-02-26 DIAGNOSIS — Z98.51 TUBAL LIGATION STATUS: Chronic | ICD-10-CM

## 2024-02-26 DIAGNOSIS — Z13.89 ENCOUNTER FOR SCREENING FOR OTHER DISORDER: ICD-10-CM

## 2024-02-26 DIAGNOSIS — Z29.9 ENCOUNTER FOR PROPHYLACTIC MEASURES, UNSPECIFIED: ICD-10-CM

## 2024-02-26 DIAGNOSIS — Z90.3 ACQUIRED ABSENCE OF STOMACH [PART OF]: Chronic | ICD-10-CM

## 2024-02-26 LAB
A1C WITH ESTIMATED AVERAGE GLUCOSE RESULT: 5.8 % — HIGH (ref 4–5.6)
ALBUMIN SERPL ELPH-MCNC: 3.6 G/DL — SIGNIFICANT CHANGE UP (ref 3.3–5.2)
ALP SERPL-CCNC: 132 U/L — HIGH (ref 40–120)
ALT FLD-CCNC: 8 U/L — SIGNIFICANT CHANGE UP
ANION GAP SERPL CALC-SCNC: 11 MMOL/L — SIGNIFICANT CHANGE UP (ref 5–17)
APTT BLD: 36 SEC — HIGH (ref 24.5–35.6)
AST SERPL-CCNC: 15 U/L — SIGNIFICANT CHANGE UP
BASOPHILS # BLD AUTO: 0.07 K/UL — SIGNIFICANT CHANGE UP (ref 0–0.2)
BASOPHILS NFR BLD AUTO: 1.1 % — SIGNIFICANT CHANGE UP (ref 0–2)
BILIRUB SERPL-MCNC: 0.2 MG/DL — LOW (ref 0.4–2)
BUN SERPL-MCNC: 8.5 MG/DL — SIGNIFICANT CHANGE UP (ref 8–20)
CALCIUM SERPL-MCNC: 9.1 MG/DL — SIGNIFICANT CHANGE UP (ref 8.4–10.5)
CHLORIDE SERPL-SCNC: 107 MMOL/L — SIGNIFICANT CHANGE UP (ref 96–108)
CO2 SERPL-SCNC: 24 MMOL/L — SIGNIFICANT CHANGE UP (ref 22–29)
CREAT SERPL-MCNC: 0.84 MG/DL — SIGNIFICANT CHANGE UP (ref 0.5–1.3)
EGFR: 84 ML/MIN/1.73M2 — SIGNIFICANT CHANGE UP
EOSINOPHIL # BLD AUTO: 0.12 K/UL — SIGNIFICANT CHANGE UP (ref 0–0.5)
EOSINOPHIL NFR BLD AUTO: 1.9 % — SIGNIFICANT CHANGE UP (ref 0–6)
ESTIMATED AVERAGE GLUCOSE: 120 MG/DL — HIGH (ref 68–114)
GLUCOSE SERPL-MCNC: 96 MG/DL — SIGNIFICANT CHANGE UP (ref 70–99)
HCT VFR BLD CALC: 38.6 % — SIGNIFICANT CHANGE UP (ref 34.5–45)
HGB BLD-MCNC: 12 G/DL — SIGNIFICANT CHANGE UP (ref 11.5–15.5)
IMM GRANULOCYTES NFR BLD AUTO: 0.2 % — SIGNIFICANT CHANGE UP (ref 0–0.9)
INR BLD: 0.98 RATIO — SIGNIFICANT CHANGE UP (ref 0.85–1.18)
LYMPHOCYTES # BLD AUTO: 2.37 K/UL — SIGNIFICANT CHANGE UP (ref 1–3.3)
LYMPHOCYTES # BLD AUTO: 36.8 % — SIGNIFICANT CHANGE UP (ref 13–44)
MCHC RBC-ENTMCNC: 24.5 PG — LOW (ref 27–34)
MCHC RBC-ENTMCNC: 31.1 GM/DL — LOW (ref 32–36)
MCV RBC AUTO: 78.9 FL — LOW (ref 80–100)
MONOCYTES # BLD AUTO: 0.51 K/UL — SIGNIFICANT CHANGE UP (ref 0–0.9)
MONOCYTES NFR BLD AUTO: 7.9 % — SIGNIFICANT CHANGE UP (ref 2–14)
NEUTROPHILS # BLD AUTO: 3.36 K/UL — SIGNIFICANT CHANGE UP (ref 1.8–7.4)
NEUTROPHILS NFR BLD AUTO: 52.1 % — SIGNIFICANT CHANGE UP (ref 43–77)
PLATELET # BLD AUTO: 424 K/UL — HIGH (ref 150–400)
POTASSIUM SERPL-MCNC: 4.3 MMOL/L — SIGNIFICANT CHANGE UP (ref 3.5–5.3)
POTASSIUM SERPL-SCNC: 4.3 MMOL/L — SIGNIFICANT CHANGE UP (ref 3.5–5.3)
PROT SERPL-MCNC: 7 G/DL — SIGNIFICANT CHANGE UP (ref 6.6–8.7)
PROTHROM AB SERPL-ACNC: 10.9 SEC — SIGNIFICANT CHANGE UP (ref 9.5–13)
RBC # BLD: 4.89 M/UL — SIGNIFICANT CHANGE UP (ref 3.8–5.2)
RBC # FLD: 18.6 % — HIGH (ref 10.3–14.5)
SODIUM SERPL-SCNC: 142 MMOL/L — SIGNIFICANT CHANGE UP (ref 135–145)
WBC # BLD: 6.44 K/UL — SIGNIFICANT CHANGE UP (ref 3.8–10.5)
WBC # FLD AUTO: 6.44 K/UL — SIGNIFICANT CHANGE UP (ref 3.8–10.5)

## 2024-02-26 PROCEDURE — 85730 THROMBOPLASTIN TIME PARTIAL: CPT

## 2024-02-26 PROCEDURE — 80053 COMPREHEN METABOLIC PANEL: CPT

## 2024-02-26 PROCEDURE — G0463: CPT

## 2024-02-26 PROCEDURE — 83036 HEMOGLOBIN GLYCOSYLATED A1C: CPT

## 2024-02-26 PROCEDURE — 36415 COLL VENOUS BLD VENIPUNCTURE: CPT

## 2024-02-26 PROCEDURE — 85025 COMPLETE CBC W/AUTO DIFF WBC: CPT

## 2024-02-26 PROCEDURE — 85610 PROTHROMBIN TIME: CPT

## 2024-02-26 RX ORDER — ACETAMINOPHEN 500 MG
2 TABLET ORAL
Qty: 0 | Refills: 0 | DISCHARGE

## 2024-02-26 RX ORDER — DULOXETINE HYDROCHLORIDE 30 MG/1
1 CAPSULE, DELAYED RELEASE ORAL
Qty: 0 | Refills: 0 | DISCHARGE

## 2024-02-26 RX ORDER — TRAZODONE HCL 50 MG
0 TABLET ORAL
Qty: 0 | Refills: 0 | DISCHARGE

## 2024-02-26 RX ORDER — DULOXETINE HYDROCHLORIDE 30 MG/1
1 CAPSULE, DELAYED RELEASE ORAL
Refills: 0 | DISCHARGE

## 2024-02-26 RX ORDER — ZOLPIDEM TARTRATE 10 MG/1
1 TABLET ORAL
Qty: 0 | Refills: 0 | DISCHARGE

## 2024-02-26 RX ORDER — TRAZODONE HCL 50 MG
0 TABLET ORAL
Refills: 0 | DISCHARGE

## 2024-02-26 RX ORDER — TOPIRAMATE 25 MG
1 TABLET ORAL
Refills: 0 | DISCHARGE

## 2024-02-26 NOTE — H&P PST ADULT - ASSESSMENT
54 yo F PMH of morbid obesity s/p sleeve gastrectomy in 2018, prediabetes and CONNOR which resolved after sleeve gastrectomy, presents for preop assessment prior to EGD with biopsy w/Dr Candelario on 3/4/2024 in preparation for revisional bariatric surgery    Patient was educated on preop preparation with written and verbal instructions. Pt was educated on NSAIDs, multivitamins and herbals that increase the risk of bleeding and need to be stopped 7 days before procedure. Pt verbalized understanding of the above.     OPIOID RISK TOOL    KAR EACH BOX THAT APPLIES AND ADD TOTALS AT THE END    FAMILY HISTORY OF SUBSTANCE ABUSE                 FEMALE         MALE                                                Alcohol                             [  ]1 pt          [  ]3pts                                               Illegal Durgs                     [  ]2 pts        [  ]3pts                                               Rx Drugs                           [  ]4 pts        [  ]4 pts    PERSONAL HISTORY OF SUBSTANCE ABUSE                                                                                          Alcohol                             [  ]3 pts       [  ]3 pts                                               Illegal Drugs                     [  ]4 pts        [  ]4 pts                                               Rx Drugs                           [  ]5 pts        [  ]5 pts    AGE BETWEEN 16-45 YEARS                                      [  ]1 pt         [  ]1 pt    HISTORY OF PREADOLESCENT   SEXUAL ABUSE                                                             [  ]3 pts        [  ]0pts    PSYCHOLOGICAL DISEASE                     ADD, OCD, Bipolar, Schizophrenia        [  ]2 pts         [  ]2 pts                      Depression                                               [ x ]1 pt           [  ]1 pt           SCORING TOTAL   (add numbers and type here)              ( 1 )                                     A score of 3 or lower indicated LOW risk for future opioid abuse  A score of 4 to 7 indicated moderate risk for future opioid abuse  A score of 8 or higher indicates a high risk for opioid abuse    CAPRINI VTE 2.0 SCORE [CLOT updated 2019]    AGE RELATED RISK FACTORS                                                       MOBILITY RELATED FACTORS  [x ] Age 41-60 years                                            (1 Point)                    [ ] Bed rest                                                        (1 Point)  [ ] Age: 61-74 years                                           (2 Points)                  [ ] Plaster cast                                                   (2 Points)  [ ] Age= 75 years                                              (3 Points)                    [ ] Bed bound for more than 72 hours                 (2 Points)    DISEASE RELATED RISK FACTORS                                               GENDER SPECIFIC FACTORS  [ ] Edema in the lower extremities                       (1 Point)              [ ] Pregnancy                                                     (1 Point)  [ ] Varicose veins                                               (1 Point)                     [ ] Post-partum < 6 weeks                                   (1 Point)             [x ] BMI > 25 Kg/m2                                            (1 Point)                     [ ] Hormonal therapy  or oral contraception          (1 Point)                 [ ] Sepsis (in the previous month)                        (1 Point)               [ ] History of pregnancy complications                 (1 point)  [ ] Pneumonia or serious lung disease                                               [ ] Unexplained or recurrent                     (1 Point)           (in the previous month)                               (1 Point)  [ ] Abnormal pulmonary function test                     (1 Point)                 SURGERY RELATED RISK FACTORS  [ ] Acute myocardial infarction                              (1 Point)               [ ]  Section                                             (1 Point)  [ ] Congestive heart failure (in the previous month)  (1 Point)      [ x] Minor surgery                                                  (1 Point)   [ ] Inflammatory bowel disease                             (1 Point)               [ ] Arthroscopic surgery                                        (2 Points)  [ ] Central venous access                                      (2 Points)                [ ] General surgery lasting more than 45 minutes (2 points)  [ ] Malignancy- Present or previous                   (2 Points)                [ ] Elective arthroplasty                                         (5 points)    [ ] Stroke (in the previous month)                          (5 Points)                                                                                                                                                           HEMATOLOGY RELATED FACTORS                                                 TRAUMA RELATED RISK FACTORS  [ ] Prior episodes of VTE                                     (3 Points)                [ ] Fracture of the hip, pelvis, or leg                       (5 Points)  [ ] Positive family history for VTE                         (3 Points)             [ ] Acute spinal cord injury (in the previous month)  (5 Points)  [ ] Prothrombin 27117 A                                     (3 Points)               [ ] Paralysis  (less than 1 month)                             (5 Points)  [ ] Factor V Leiden                                             (3 Points)                  [ ] Multiple Trauma within 1 month                        (5 Points)  [ ] Lupus anticoagulants                                     (3 Points)                                                           [ ] Anticardiolipin antibodies                               (3 Points)                                                       [ ] High homocysteine in the blood                      (3 Points)                                             [ ] Other congenital or acquired thrombophilia      (3 Points)                                                [ ] Heparin induced thrombocytopenia                  (3 Points)                                     Total Score [    3      ] 54 yo F PMH of morbid obesity s/p sleeve gastrectomy in 2018, prediabetes and CONNOR which resolved after sleeve gastrectomy, patient reports she initially lost 60 lbs and gained 80 lbs back, presents for preop assessment prior to EGD with biopsy w/Dr Candelario on 3/4/2024 in preparation for revisional bariatric surgery    Patient was educated on preop preparation with written and verbal instructions. Pt was educated on NSAIDs, multivitamins and herbals that increase the risk of bleeding and need to be stopped 7 days before procedure. Pt verbalized understanding of the above.     OPIOID RISK TOOL    KAR EACH BOX THAT APPLIES AND ADD TOTALS AT THE END    FAMILY HISTORY OF SUBSTANCE ABUSE                 FEMALE         MALE                                                Alcohol                             [  ]1 pt          [  ]3pts                                               Illegal Drugs                     [  ]2 pts        [  ]3pts                                               Rx Drugs                           [  ]4 pts        [  ]4 pts    PERSONAL HISTORY OF SUBSTANCE ABUSE                                                                                          Alcohol                             [  ]3 pts       [  ]3 pts                                               Illegal Drugs                     [  ]4 pts        [  ]4 pts                                               Rx Drugs                           [  ]5 pts        [  ]5 pts    AGE BETWEEN 16-45 YEARS                                      [  ]1 pt         [  ]1 pt    HISTORY OF PREADOLESCENT   SEXUAL ABUSE                                                             [  ]3 pts        [  ]0pts    PSYCHOLOGICAL DISEASE                     ADD, OCD, Bipolar, Schizophrenia        [  ]2 pts         [  ]2 pts                      Depression                                               [ x ]1 pt           [  ]1 pt           SCORING TOTAL   (add numbers and type here)              ( 1 )                                     A score of 3 or lower indicated LOW risk for future opioid abuse  A score of 4 to 7 indicated moderate risk for future opioid abuse  A score of 8 or higher indicates a high risk for opioid abuse    CAPRINI VTE 2.0 SCORE [CLOT updated 2019]    AGE RELATED RISK FACTORS                                                       MOBILITY RELATED FACTORS  [x ] Age 41-60 years                                            (1 Point)                    [ ] Bed rest                                                        (1 Point)  [ ] Age: 61-74 years                                           (2 Points)                  [ ] Plaster cast                                                   (2 Points)  [ ] Age= 75 years                                              (3 Points)                    [ ] Bed bound for more than 72 hours                 (2 Points)    DISEASE RELATED RISK FACTORS                                               GENDER SPECIFIC FACTORS  [ ] Edema in the lower extremities                       (1 Point)              [ ] Pregnancy                                                     (1 Point)  [ ] Varicose veins                                               (1 Point)                     [ ] Post-partum < 6 weeks                                   (1 Point)             [x ] BMI > 25 Kg/m2                                            (1 Point)                     [ ] Hormonal therapy  or oral contraception          (1 Point)                 [ ] Sepsis (in the previous month)                        (1 Point)               [ ] History of pregnancy complications                 (1 point)  [ ] Pneumonia or serious lung disease                                               [ ] Unexplained or recurrent                     (1 Point)           (in the previous month)                               (1 Point)  [ ] Abnormal pulmonary function test                     (1 Point)                 SURGERY RELATED RISK FACTORS  [ ] Acute myocardial infarction                              (1 Point)               [ ]  Section                                             (1 Point)  [ ] Congestive heart failure (in the previous month)  (1 Point)      [ x] Minor surgery                                                  (1 Point)   [ ] Inflammatory bowel disease                             (1 Point)               [ ] Arthroscopic surgery                                        (2 Points)  [ ] Central venous access                                      (2 Points)                [ ] General surgery lasting more than 45 minutes (2 points)  [ ] Malignancy- Present or previous                   (2 Points)                [ ] Elective arthroplasty                                         (5 points)    [ ] Stroke (in the previous month)                          (5 Points)                                                                                                                                                           HEMATOLOGY RELATED FACTORS                                                 TRAUMA RELATED RISK FACTORS  [ ] Prior episodes of VTE                                     (3 Points)                [ ] Fracture of the hip, pelvis, or leg                       (5 Points)  [ ] Positive family history for VTE                         (3 Points)             [ ] Acute spinal cord injury (in the previous month)  (5 Points)  [ ] Prothrombin 84105 A                                     (3 Points)               [ ] Paralysis  (less than 1 month)                             (5 Points)  [ ] Factor V Leiden                                             (3 Points)                  [ ] Multiple Trauma within 1 month                        (5 Points)  [ ] Lupus anticoagulants                                     (3 Points)                                                           [ ] Anticardiolipin antibodies                               (3 Points)                                                       [ ] High homocysteine in the blood                      (3 Points)                                             [ ] Other congenital or acquired thrombophilia      (3 Points)                                                [ ] Heparin induced thrombocytopenia                  (3 Points)                                     Total Score [    3      ]

## 2024-02-26 NOTE — H&P PST ADULT - HISTORY OF PRESENT ILLNESS
54 yo F PMH of morbid obesity s/p sleeve gastrectomy in 2018, prediabetes and CONNOR which resolved after sleeve gastrectomy, presents for preop assessment prior to EGD with biopsy w/Dr Candelario on 3/4/2024 in preparation for revisional bariatric surgery 54 yo F PMH of morbid obesity s/p sleeve gastrectomy in 2018, prediabetes and CONNOR which resolved after sleeve gastrectomy, patient reports she initially lost 60 lbs and gained 80 lbs back, presents for preop assessment prior to EGD with biopsy w/Dr Candelario on 3/4/2024 in preparation for revisional bariatric surgery

## 2024-02-26 NOTE — H&P PST ADULT - MUSCULOSKELETAL
ROM intact/no calf tenderness/normal gait/strength 5/5 bilateral upper extremities/strength 5/5 bilateral lower extremities details… ROM intact/no calf tenderness/normal gait/strength 5/5 bilateral lower extremities/back exam/decreased strength

## 2024-02-26 NOTE — H&P PST ADULT - NSICDXPASTMEDICALHX_GEN_ALL_CORE_FT
PAST MEDICAL HISTORY:  Anemia     Carpal tunnel syndrome, bilateral     Depression     Encounter for other preprocedural examination     Migraine     Morbid (severe) obesity due to excess calories     Vertigo

## 2024-02-26 NOTE — H&P PST ADULT - GASTROINTESTINAL
soft/nontender/nondistended/normal active bowel sounds/no guarding/no masses palpable soft/nontender/nondistended/normal active bowel sounds/no guarding details…

## 2024-02-29 ENCOUNTER — NON-APPOINTMENT (OUTPATIENT)
Age: 54
End: 2024-02-29

## 2024-03-03 ENCOUNTER — TRANSCRIPTION ENCOUNTER (OUTPATIENT)
Age: 54
End: 2024-03-03

## 2024-03-04 ENCOUNTER — APPOINTMENT (OUTPATIENT)
Dept: SURGERY | Facility: HOSPITAL | Age: 54
End: 2024-03-04

## 2024-03-04 ENCOUNTER — OUTPATIENT (OUTPATIENT)
Dept: OUTPATIENT SERVICES | Facility: HOSPITAL | Age: 54
LOS: 1 days | End: 2024-03-04
Payer: MEDICARE

## 2024-03-04 ENCOUNTER — RESULT REVIEW (OUTPATIENT)
Age: 54
End: 2024-03-04

## 2024-03-04 DIAGNOSIS — Z98.89 OTHER SPECIFIED POSTPROCEDURAL STATES: Chronic | ICD-10-CM

## 2024-03-04 DIAGNOSIS — Z01.818 ENCOUNTER FOR OTHER PREPROCEDURAL EXAMINATION: ICD-10-CM

## 2024-03-04 DIAGNOSIS — Z90.3 ACQUIRED ABSENCE OF STOMACH [PART OF]: Chronic | ICD-10-CM

## 2024-03-04 DIAGNOSIS — E66.01 MORBID (SEVERE) OBESITY DUE TO EXCESS CALORIES: ICD-10-CM

## 2024-03-04 DIAGNOSIS — Z98.51 TUBAL LIGATION STATUS: Chronic | ICD-10-CM

## 2024-03-04 DIAGNOSIS — Z90.710 ACQUIRED ABSENCE OF BOTH CERVIX AND UTERUS: Chronic | ICD-10-CM

## 2024-03-04 PROCEDURE — 88342 IMHCHEM/IMCYTCHM 1ST ANTB: CPT | Mod: 26

## 2024-03-04 PROCEDURE — 43239 EGD BIOPSY SINGLE/MULTIPLE: CPT

## 2024-03-04 PROCEDURE — 88342 IMHCHEM/IMCYTCHM 1ST ANTB: CPT

## 2024-03-04 PROCEDURE — 88305 TISSUE EXAM BY PATHOLOGIST: CPT

## 2024-03-04 PROCEDURE — 88305 TISSUE EXAM BY PATHOLOGIST: CPT | Mod: 26

## 2024-03-05 ENCOUNTER — NON-APPOINTMENT (OUTPATIENT)
Age: 54
End: 2024-03-05

## 2024-03-06 LAB — SURGICAL PATHOLOGY STUDY: SIGNIFICANT CHANGE UP

## 2024-03-08 ENCOUNTER — APPOINTMENT (OUTPATIENT)
Dept: GASTROENTEROLOGY | Facility: CLINIC | Age: 54
End: 2024-03-08

## 2024-03-13 NOTE — ED ADULT NURSE NOTE - TEMPLATE
[Feeling Poorly] : not feeling poorly (malaise) [Fever] : no fever [Wgt Loss (___ Lbs)] : no recent weight loss [Pallor] : not pale [Eye Discharge] : no eye discharge [Redness] : no redness [Change in Vision] : no change in vision [Nasal Stuffiness] : no nasal congestion General [Sore Throat] : no sore throat [Earache] : no earache [Loss Of Hearing] : no hearing loss [Cyanosis] : no cyanosis [Edema] : no edema [Chest Pain] : no chest pain or discomfort [Diaphoresis] : not diaphoretic [Exercise Intolerance] : no persistence of exercise intolerance [Palpitations] : no palpitations [Orthopnea] : no orthopnea [Fast HR] : no tachycardia [Tachypnea] : not tachypneic [Wheezing] : no wheezing [Cough] : no cough [Shortness Of Breath] : not expressed as feeling short of breath [Vomiting] : no vomiting [Diarrhea] : no diarrhea [Abdominal Pain] : no abdominal pain [Decrease In Appetite] : appetite not decreased [Fainting (Syncope)] : no fainting [Seizure] : no seizures [Headache] : no headache [Dizziness] : no dizziness [Limping] : no limping [Joint Pains] : no arthralgias [Joint Swelling] : no joint swelling [Rash] : no rash [Wound problems] : no wound problems [Easy Bruising] : no tendency for easy bruising [Swollen Glands] : no lymphadenopathy [Easy Bleeding] : no ~M tendency for easy bleeding [Nosebleeds] : no epistaxis [Sleep Disturbances] : ~T no sleep disturbances [Hyperactive] : no hyperactive behavior [Depression] : no depression [Anxiety] : no anxiety [Failure To Thrive] : no failure to thrive [Short Stature] : short stature was not noted [Jitteriness] : no jitteriness [Heat/Cold Intolerance] : no temperature intolerance [Dec Urine Output] : no oliguria

## 2024-03-16 PROBLEM — Z01.818 ENCOUNTER FOR OTHER PREPROCEDURAL EXAMINATION: Chronic | Status: ACTIVE | Noted: 2024-02-26

## 2024-03-16 PROBLEM — E66.01 MORBID (SEVERE) OBESITY DUE TO EXCESS CALORIES: Chronic | Status: ACTIVE | Noted: 2024-02-26

## 2024-03-19 ENCOUNTER — OUTPATIENT (OUTPATIENT)
Dept: OUTPATIENT SERVICES | Facility: HOSPITAL | Age: 54
LOS: 1 days | End: 2024-03-19
Payer: MEDICARE

## 2024-03-19 DIAGNOSIS — Z98.89 OTHER SPECIFIED POSTPROCEDURAL STATES: Chronic | ICD-10-CM

## 2024-03-19 DIAGNOSIS — Z90.710 ACQUIRED ABSENCE OF BOTH CERVIX AND UTERUS: Chronic | ICD-10-CM

## 2024-03-19 DIAGNOSIS — Z90.3 ACQUIRED ABSENCE OF STOMACH [PART OF]: Chronic | ICD-10-CM

## 2024-03-19 DIAGNOSIS — Z98.51 TUBAL LIGATION STATUS: Chronic | ICD-10-CM

## 2024-03-19 DIAGNOSIS — Z01.818 ENCOUNTER FOR OTHER PREPROCEDURAL EXAMINATION: ICD-10-CM

## 2024-03-19 PROCEDURE — 74240 X-RAY XM UPR GI TRC 1CNTRST: CPT | Mod: 26

## 2024-03-19 PROCEDURE — 74240 X-RAY XM UPR GI TRC 1CNTRST: CPT

## 2024-03-22 ENCOUNTER — NON-APPOINTMENT (OUTPATIENT)
Age: 54
End: 2024-03-22

## 2024-03-22 ENCOUNTER — APPOINTMENT (OUTPATIENT)
Dept: INTERNAL MEDICINE | Facility: CLINIC | Age: 54
End: 2024-03-22
Payer: MEDICARE

## 2024-03-22 VITALS — DIASTOLIC BLOOD PRESSURE: 76 MMHG | SYSTOLIC BLOOD PRESSURE: 128 MMHG

## 2024-03-22 VITALS
HEIGHT: 62 IN | RESPIRATION RATE: 12 BRPM | HEART RATE: 71 BPM | OXYGEN SATURATION: 97 % | BODY MASS INDEX: 38.28 KG/M2 | WEIGHT: 208 LBS

## 2024-03-22 DIAGNOSIS — Z00.00 ENCOUNTER FOR GENERAL ADULT MEDICAL EXAMINATION W/OUT ABNORMAL FINDINGS: ICD-10-CM

## 2024-03-22 DIAGNOSIS — E66.01 MORBID (SEVERE) OBESITY DUE TO EXCESS CALORIES: ICD-10-CM

## 2024-03-22 DIAGNOSIS — Z01.818 ENCOUNTER FOR OTHER PREPROCEDURAL EXAMINATION: ICD-10-CM

## 2024-03-22 PROCEDURE — 99214 OFFICE O/P EST MOD 30 MIN: CPT | Mod: GC

## 2024-03-22 RX ORDER — NABUMETONE 500 MG/1
500 TABLET, FILM COATED ORAL
Qty: 60 | Refills: 0 | Status: DISCONTINUED | COMMUNITY
Start: 2022-11-10 | End: 2024-03-22

## 2024-03-22 RX ORDER — DULOXETINE HYDROCHLORIDE 20 MG/1
20 CAPSULE, DELAYED RELEASE PELLETS ORAL DAILY
Qty: 60 | Refills: 5 | Status: DISCONTINUED | COMMUNITY
End: 2024-03-22

## 2024-03-22 RX ORDER — DULOXETINE HYDROCHLORIDE 20 MG/1
20 CAPSULE, DELAYED RELEASE PELLETS ORAL TWICE DAILY
Qty: 60 | Refills: 2 | Status: ACTIVE | COMMUNITY
Start: 2024-03-22 | End: 1900-01-01

## 2024-03-22 RX ORDER — DULOXETINE HYDROCHLORIDE 20 MG/1
20 CAPSULE, DELAYED RELEASE PELLETS ORAL DAILY
Qty: 60 | Refills: 2 | Status: ACTIVE | COMMUNITY
Start: 2024-03-22 | End: 1900-01-01

## 2024-03-22 NOTE — REVIEW OF SYSTEMS
[Headache] : headache [Negative] : Heme/Lymph [Dizziness] : no dizziness [Fainting] : no fainting [Confusion] : no confusion [Memory Loss] : no memory loss [Unsteady Walking] : no ataxia [FreeTextEntry9] : b/l shoulder pain

## 2024-03-22 NOTE — ASSESSMENT
[Patient Optimized for Surgery] : Patient optimized for surgery [No Further Testing Recommended] : no further testing recommended [High Risk Surgery - Intraperitoneal, Intrathoracic or Supringuinal Vascular Procedures] : High Risk Surgery - Intraperitoneal, Intrathoracic or Supringuinal Vascular Procedures - No (0) [Ischemic Heart Disease] : Ischemic Heart Disease - No (0) [Congestive Heart Failure] : Congestive Heart Failure - No (0) [Prior Cerebrovascular Accident or TIA] : Prior Cerebrovascular Accident or TIA - No (0) [Creatinine >= 2mg/dL (1 Point)] : Creatinine >= 2mg/dL - No (0) [Insulin-dependent Diabetic (1 Point)] : Insulin-dependent Diabetic - No (0) [0] : 0 , RCRI Class: I, Risk of Post-Op Cardiac Complications: 3.9%, 95% CI for Risk Estimate: 2.8% - 5.4% [FreeTextEntry4] : 53 F PMHx cervical radiculopathy, obesity, right shoulder pain, fibromyalgia, depression and anxiety. Presenting today for pre-op testing, had gastric sleeve in the past, was incomplete, needs gastric bypass surgery. Gaining weight, was 135 lbs went up to 300 lbs, was on various medications which worked but the change in doctors took her off the medication cocktail, needs to lose weight to be cleared for breast augmentation. Did not come with paperwork. Discussed with surgeon.   . Patient is medically optimized for planned procedure pending bw. Advised to hold NSAIDs/ASA and natural/OTC supplements one week prior to procedure. May use tylenol/acetaminophen for any pains. Patient understood and agreed with plan.

## 2024-03-22 NOTE — HISTORY OF PRESENT ILLNESS
[de-identified] : 53 F PMHx cervical radiculopathy, obesity, right shoulder pain. Presenting today for pre-op testing, had gastric sleeve in the past, was incomplete, needs gastric bypass surgery. Gaining weight, was 135 lbs went up to 300 lbs, was on various medications which worked but the change in doctors took her off the medication cocktail, needs to lose weight to be cleared for breast augmentation. Did not come with paperwork.   [No Pertinent Cardiac History] : no history of aortic stenosis, atrial fibrillation, coronary artery disease, recent myocardial infarction, or implantable device/pacemaker [No Adverse Anesthesia Reaction] : no adverse anesthesia reaction in self or family member [No Pertinent Pulmonary History] : no history of asthma, COPD, sleep apnea, or smoking [(Patient denies any chest pain, claudication, dyspnea on exertion, orthopnea, palpitations or syncope)] : Patient denies any chest pain, claudication, dyspnea on exertion, orthopnea, palpitations or syncope [____ METs%] : [unfilled] METs% [Chronic Anticoagulation] : no chronic anticoagulation [Chronic Kidney Disease] : no chronic kidney disease [Diabetes] : no diabetes [Good (7-10 METs)] : Good (7-10 METs) [FreeTextEntry1] : Gastric bypass surgery  [FreeTextEntry2] : TBD w/in 30 days  [FreeTextEntry3] : Dr. Candelario [FreeTextEntry4] : 53 F PMHx cervical radiculopathy, obesity, right shoulder pain, fibromyalgia, depression and anxiety. Presenting today for pre-op testing, had gastric sleeve in the past, was incomplete, needs gastric bypass surgery. Gaining weight, was 135 lbs went up to 300 lbs, was on various medications which worked but the change in doctors took her off the medication cocktail, needs to lose weight to be cleared for breast augmentation. Did not come with paperwork. Discussed with surgeon.  [FreeTextEntry7] :  Had preop cardiovascular eval. Patient's normal ejection fraction, normal stress test, adequate functional capacity, and the absence of unstable angina, congestive heart failure, or severe aortic stenosis, she is at average risk for perioperative cardiovascular morbidity and mortality, and cleared from a cardiology standpoint with the following recommendations: Perioperative beta-blockers, endocarditis prophylaxis, and a postoperative monitored bed are not indicated. DVT prophylaxis at the surgeon's discretion. Physiologic murmur demonstrated by echocardiography. Poor exercise tolerance, we discussed diet, exercise, and weight loss.

## 2024-03-22 NOTE — ADDENDUM
[FreeTextEntry1] :  I saw and evaluated this patient. I agree with the resident's documentation and I participated in the management and plan of care, unless otherwise documented by me.

## 2024-03-22 NOTE — PLAN
[FreeTextEntry1] : 53 F PMHx cervical radiculopathy, obesity, right shoulder pain.   Obesity/Medical optimization - Patient sent for pre-op blood work by surgeon, will review once resulted - Weight management discussed  - Cardiology clearance obtained pre-visit - Patient is medically optimized for surgery post-review of results.

## 2024-03-26 DIAGNOSIS — E55.9 VITAMIN D DEFICIENCY, UNSPECIFIED: ICD-10-CM

## 2024-03-26 LAB
25(OH)D3 SERPL-MCNC: 17.7 NG/ML
ALBUMIN SERPL ELPH-MCNC: 4.1 G/DL
ALP BLD-CCNC: 149 U/L
ALT SERPL-CCNC: 22 U/L
ANION GAP SERPL CALC-SCNC: 13 MMOL/L
APTT BLD: 37.5 SEC
AST SERPL-CCNC: 25 U/L
BASOPHILS # BLD AUTO: 0.07 K/UL
BASOPHILS NFR BLD AUTO: 1.1 %
BILIRUB DIRECT SERPL-MCNC: 0.1 MG/DL
BILIRUB INDIRECT SERPL-MCNC: 0.1 MG/DL
BILIRUB SERPL-MCNC: 0.2 MG/DL
BUN SERPL-MCNC: 5 MG/DL
CA-I SERPL-SCNC: 5.1 MG/DL
CALCIUM SERPL-MCNC: 9.5 MG/DL
CALCIUM SERPL-MCNC: 9.5 MG/DL
CHLORIDE SERPL-SCNC: 107 MMOL/L
CHOLEST SERPL-MCNC: 197 MG/DL
CO2 SERPL-SCNC: 22 MMOL/L
CREAT SERPL-MCNC: 0.87 MG/DL
EGFR: 80 ML/MIN/1.73M2
EOSINOPHIL # BLD AUTO: 0.23 K/UL
EOSINOPHIL NFR BLD AUTO: 3.7 %
ESTIMATED AVERAGE GLUCOSE: 111 MG/DL
FERRITIN SERPL-MCNC: 55 NG/ML
FOLATE SERPL-MCNC: 4.6 NG/ML
GLUCOSE SERPL-MCNC: 90 MG/DL
HBA1C MFR BLD HPLC: 5.5 %
HCG SERPL QL: NEGATIVE
HCT VFR BLD CALC: 39.1 %
HDLC SERPL-MCNC: 54 MG/DL
HGB BLD-MCNC: 11.5 G/DL
IMM GRANULOCYTES NFR BLD AUTO: 0.2 %
INR PPP: 0.95 RATIO
IRON SATN MFR SERPL: 12 %
IRON SERPL-MCNC: 35 UG/DL
LDLC SERPL CALC-MCNC: 130 MG/DL
LYMPHOCYTES # BLD AUTO: 2.46 K/UL
LYMPHOCYTES NFR BLD AUTO: 39.7 %
MAGNESIUM SERPL-MCNC: 2.4 MG/DL
MAN DIFF?: NORMAL
MCHC RBC-ENTMCNC: 24.4 PG
MCHC RBC-ENTMCNC: 29.4 GM/DL
MCV RBC AUTO: 82.8 FL
MONOCYTES # BLD AUTO: 0.46 K/UL
MONOCYTES NFR BLD AUTO: 7.4 %
NEUTROPHILS # BLD AUTO: 2.97 K/UL
NEUTROPHILS NFR BLD AUTO: 47.9 %
NONHDLC SERPL-MCNC: 143 MG/DL
PAPP-A SERPL-ACNC: 4 MIU/ML
PARATHYROID HORMONE INTACT: 45 PG/ML
PHOSPHATE SERPL-MCNC: 3.3 MG/DL
PLATELET # BLD AUTO: 492 K/UL
POTASSIUM SERPL-SCNC: 4.1 MMOL/L
PREALB SERPL NEPH-MCNC: 19 MG/DL
PROT SERPL-MCNC: 7.1 G/DL
PT BLD: 10.7 SEC
RBC # BLD: 4.72 M/UL
RBC # FLD: 19.3 %
SODIUM SERPL-SCNC: 143 MMOL/L
T4 SERPL-MCNC: 6.3 UG/DL
TIBC SERPL-MCNC: 302 UG/DL
TRIGL SERPL-MCNC: 71 MG/DL
TSH SERPL-ACNC: 1.88 UIU/ML
UIBC SERPL-MCNC: 267 UG/DL
VIT B12 SERPL-MCNC: 311 PG/ML
WBC # FLD AUTO: 6.2 K/UL

## 2024-03-26 RX ORDER — ERGOCALCIFEROL 1.25 MG/1
50000 CAPSULE ORAL
Qty: 12 | Refills: 0 | Status: ACTIVE | COMMUNITY
Start: 2024-03-26 | End: 1900-01-01

## 2024-03-27 LAB
COPPER SERPL-MCNC: 139 UG/DL
VIT B6 SERPL-MCNC: 3.5 UG/L
ZINC SERPL-MCNC: 66 UG/DL

## 2024-03-28 LAB
MENADIONE SERPL-MCNC: 0.36 NG/ML
VIT C SERPL-MCNC: 0.2 MG/DL

## 2024-03-29 LAB
A-TOCOPHEROL VIT E SERPL-MCNC: 8.4 MG/L
BETA+GAMMA TOCOPHEROL SERPL-MCNC: 2 MG/L
VIT A SERPL-MCNC: 31.9 UG/DL

## 2024-04-01 ENCOUNTER — APPOINTMENT (OUTPATIENT)
Dept: OBGYN | Facility: CLINIC | Age: 54
End: 2024-04-01
Payer: MEDICARE

## 2024-04-01 VITALS — SYSTOLIC BLOOD PRESSURE: 124 MMHG | BODY MASS INDEX: 38.04 KG/M2 | DIASTOLIC BLOOD PRESSURE: 80 MMHG | WEIGHT: 208 LBS

## 2024-04-01 DIAGNOSIS — Z01.411 ENCOUNTER FOR GYNECOLOGICAL EXAMINATION (GENERAL) (ROUTINE) WITH ABNORMAL FINDINGS: ICD-10-CM

## 2024-04-01 DIAGNOSIS — Z12.39 ENCOUNTER FOR OTHER SCREENING FOR MALIGNANT NEOPLASM OF BREAST: ICD-10-CM

## 2024-04-01 DIAGNOSIS — Z13.820 ENCOUNTER FOR SCREENING FOR OSTEOPOROSIS: ICD-10-CM

## 2024-04-01 DIAGNOSIS — Z12.4 ENCOUNTER FOR SCREENING FOR MALIGNANT NEOPLASM OF CERVIX: ICD-10-CM

## 2024-04-01 LAB — VIT B2 SERPL-MCNC: 225 UG/L

## 2024-04-01 PROCEDURE — 99396 PREV VISIT EST AGE 40-64: CPT

## 2024-04-01 NOTE — PLAN
[FreeTextEntry1] : Encounter for GYN exam   - Pap obtained   Encounter for Breast cancer screening   - Mammogram ordered  Osteoporosis screening   - DEXA ordered   F/U in 1 year or PRN  All questions and concerns addressed during encounter. Pt. agreed to plan of care.

## 2024-04-01 NOTE — PHYSICAL EXAM
[Chaperone Present] : A chaperone was present in the examining room during all aspects of the physical examination [Appropriately responsive] : appropriately responsive [Alert] : alert [No Acute Distress] : no acute distress [Soft] : soft [Non-tender] : non-tender [Non-distended] : non-distended [No HSM] : No HSM [No Lesions] : no lesions [No Mass] : no mass [Oriented x3] : oriented x3 [Examination Of The Breasts] : a normal appearance [No Masses] : no breast masses were palpable [Labia Minora] : normal [Labia Majora] : normal [Normal] : normal [Absent] : absent [Uterine Adnexae] : normal

## 2024-04-01 NOTE — HISTORY OF PRESENT ILLNESS
[Patient reported colonoscopy was normal] : Patient reported colonoscopy was normal [N] : Patient does not use contraception [FreeTextEntry1] : 53 year old female presents for annual examination. Pt. had hysterectomy performed for menorrhagia in the past. Doing well today, no complaints. Needs mammogram prescription.  [Mammogramdate] : 2022 [ColonoscopyDate] : 2024 [PGHxTotal] : 3 [PGHxFullTerm] : 0 [PGHxPremature] : 0 [PGHxAbortions] : 0 [PGHxABInduced] : 1 [[facility] [PGHxABSpont] : 0 [PGHxEctopic] : 0 [PGHxMultBirths] : 0

## 2024-04-02 LAB — VIT B1 SERPL-MCNC: 72 NMOL/L

## 2024-04-03 ENCOUNTER — APPOINTMENT (OUTPATIENT)
Dept: NEUROLOGY | Facility: CLINIC | Age: 54
End: 2024-04-03
Payer: MEDICARE

## 2024-04-03 DIAGNOSIS — G43.009 MIGRAINE W/OUT AURA, NOT INTRACTABLE, W/OUT STATUS MIGRAINOSUS: ICD-10-CM

## 2024-04-03 LAB — HPV HIGH+LOW RISK DNA PNL CVX: NOT DETECTED

## 2024-04-03 PROCEDURE — 99213 OFFICE O/P EST LOW 20 MIN: CPT | Mod: 95

## 2024-04-03 RX ORDER — TOPIRAMATE 50 MG/1
50 TABLET, FILM COATED ORAL
Qty: 270 | Refills: 3 | Status: ACTIVE | COMMUNITY
Start: 2020-06-09 | End: 1900-01-01

## 2024-04-03 NOTE — HISTORY OF PRESENT ILLNESS
[FreeTextEntry1] : Initial office visit February 18, 2020: This is a 49-year-old woman who presents today for evaluation of migraine headache. She previously seen different neurologist who had prescribed Topamax. She is not sure of the dose. She has been getting the Topamax from her psychiatrist and they are only comfortable prescribing 25 mg twice a day. If this dose she is having 3 headaches per week. The headaches are accompanied with nausea vomiting photophobia and the need to stay in a dark quiet room. She had tried Imitrex for them but probably wasn't taking in as soon as the headache came on. She states that it got worse before it got better and then made her feel heavy. She is here today for neurologic evaluation and treatment of these frequent migraine headaches.  Followup May 18, 2020: This is a 50-year-old woman who presents today for followup of migraine headache. This visit was done via video conferencing due to the corona virus outbreak. Verbal consent was obtained at the time of the visit. Since starting the Topamax her headache frequency has gone down. Typically she'll take Imitrex for breakthrough headaches which works. Since starting the Topamax she is dropped her headache frequency by over 50%. She is here today for neurologic followup evaluation via video.  Followup October 19, 2020: This is a 50-year-old woman who presents today for followup of migraine headache as well as a recurrence of incontinence. The visit was done via video conferencing during the monty virus outbreak. She previously provided consent for video visits. She states as far as her migraines are concerned there is slightly increased she's having a bit more stress at this time. She states that this is expected during times of stress. She is taking the Topamax without side effects. She is concerned because she has new onset of incontinence. This apparently started in June but did not tell anyone. She was scheduled to have a lumbar spine MRI yesterday as ordered by her rheumatologist but she missed the appointment. When asked how often she is having incontinence she states to often it sounds like it's daily/multiple times per day. It is both bowel and bladder. She states her legs are slightly weak. She is here today for neurologic followup via video.  Followup October 26, 2020: This is a 50-year-old woman who presents today for a duration of bowel and bladder incontinence. I seen her last week via video visit for headaches. While her headaches were more or less under control she did express that she was having incontinence of both bowel and gallbladder. I had her followup up with me in the office to do a proper neurologic examination, but was unable to be done via video. Today she states that she is having both bowel and bladder incontinence. The balance constant seems almost in urge or overflow incontinence and Effexor more she can't get to the bathroom in time. However her bladder incontinence is more spontaneous. She states one day she was on the telephone I did not know that she was incontinent until she got up and she was wet. Additionally she states that she's had divided mattress due to incontinence. She has a bilateral lower extremity weakness which may be attributable to her lumbar radiculopathy. She had a lumbar spine MRI ordered which showed degenerative changes but no clear reason for incontinence. She is here today for neurologic followup.  Followup January 20, 2021: This is a 50-year-old woman who presents today for followup for migraine headache. She also had bowel and bladder incontinence and a new problem of neck and back pain following fall. She is doing well from a migraine perspective with maybe a slight increase in the migraines only following the fall possibly due to her neck pain. She had one episode of bowel incontinence since being seen last and she has a followup appointment with gastroenterology. She also fell on New Year's and had a CT of her head which I reviewed which was normal without any acute pathology. She also had neck and lumbar spine x-rays which showed muscle spasm. She was started on Flexeril with some relief but not entirely relieved. She is here today for neurologic followup and evaluation.  Followup March 16, 2021: This is a 50-year-old woman who presents today for followup of migraine headache as well as neck pain and concussion. She is seen by video visit during the COVID outbreak.. Verbal consent is obtained at the beginning of the visit. She had a fall on New Year's Day which affected her shoulders as well as her neck. She is having worsening headaches. She seems to be under a lot of stress as well. She tries to take Imitrex for these headaches and it is not helping. She is not able to take ibuprofen or NSAIDs because of gastric sleep surgery. She reports no new symptoms of her concussion that she suffered from a fall. Primarily she is having musculoskeletal pain in the neck shoulders which is leading to tension type headache. This has been worsening since she was seen last. She is here today for neurologic followup.  Follow-up October 2, 2023: This is a 53-year-old woman who presents today with migraine headaches.  She was last seen by video visit about 2-1/2 years ago.  She states that since then her headaches had increased because she had stopped taking topiramate.  About a month ago she was averaging about 3 headaches per week.  She was restarted on topiramate and now it is down to 1 headache per week.  She will take Imitrex which helps with the headaches but not before making her feel fairly ill.  She is here today for neurologic follow-up visit.  Follow-up April 3, 2024: This is a 53-year-old woman who presents with migraine headache.  She is seen by video visit at her request due to the weather.  She is now having 2-3 headaches per week which is down from 3-4 headaches per week.  This follows an increase in her topiramate.  She is tolerating the medication well.  The headaches continue to be accompanied by photophobia as well as nausea.  She is seen today for neurologic follow-up.

## 2024-04-03 NOTE — PHYSICAL EXAM
[FreeTextEntry1] : Neurologic examination was limited due to the video call.  Mental status: Awake and alert fully oriented to person place and time. There is no aphasia.  Cranial nerves:  Full extraocular movements. There is no nystagmus. Normal facial sensation and motor function. Tongue was in the midline.  Motor: no pronator drift  bilaterally.  Sensation: Light touch was intact   Coordination: Deferred  Gait: Deferred

## 2024-04-03 NOTE — ASSESSMENT
[FreeTextEntry1] : This is a 53-year-old woman with migraine headaches.  She is continuing to have them 2-3 times per week.  I will increase the Topamax from 50 mg twice a day to 50 mg in the morning and 100 mg at night.  I asked her to call me in about 1 to 2 months to see how this changes helping.  If it is not helping we will increase it further.  I will see her back in the office in 6 months, sooner should the need arise.

## 2024-04-03 NOTE — REASON FOR VISIT
[Home] : at home, [unfilled] , at the time of the visit. [Medical Office: ([facility])___] : at the medical office located in  [Patient] : the patient [Follow-Up: _____] : a [unfilled] follow-up visit

## 2024-04-08 LAB — CYTOLOGY CVX/VAG DOC THIN PREP: ABNORMAL

## 2024-04-22 NOTE — HISTORY OF PRESENT ILLNESS
[FreeTextEntry1] : Pre-op  [de-identified] : 53 F PMHx cervical radiculopathy, obesity, right shoulder pain. Presenting today for pre-op testing, had gastric sleeve in the past, was incomplete, needs gastric bypass surgery. Gaining weight, was 135 lbs went up to 300 lbs, was on various medications which worked but the change in doctors took her off the medication cocktail, needs to lose weight to be cleared for breast augmentation. Did not come with paperwork.

## 2024-04-30 NOTE — DISCUSSION/SUMMARY
OB [de-identified] : Very pleasant surgical conversation was conducted with Sita on today's date August 1, 2023 with regard to two-level ACDF at 4 5 and 5 6 secondary to cervical spondylosis she has no critical stenosis she has no myelopathic type findings she does have a C6 and C5 radiculopathy on the right upper extremity patient wishes to continue nonoperative measures follow-up and try another injection with Dr.'s Dubon's she will also try right shoulder injection with Dr.'s Crawford as well.  If she gets to the point where her quality of life is not acceptable worsening right upper extremity radiculopathy she can return for repeat two-level ACDF surgical conversation.

## 2024-09-24 NOTE — ED PROVIDER NOTE - CHPI ED SYMPTOMS POS
NAUSEA/PAIN [Trigger point 1-2 muscle groups] : trigger point 1-2 muscle groups [Right] : of the right [Lumbar paraspinal muscle] : lumbar paraspinal muscle [Pain] : pain [Alcohol] : alcohol [Betadine] : betadine [Ethyl Chloride sprayed topically] : ethyl chloride sprayed topically [___ cc    1%] : Lidocaine ~Vcc of 1%  [___ cc    0.25%] : Bupivacaine (Marcaine) ~Vcc of 0.25%  [___ cc    10mg] : Triamcinolone (Kenalog) ~Vcc of 10 mg  [Risks, benefits, alternatives discussed / Verbal consent obtained] : the risks benefits, and alternatives have been discussed, and verbal consent was obtained [FreeTextEntry3] : pain was 8 prior to the injection and 4 after

## 2024-10-11 DIAGNOSIS — M62.838 OTHER MUSCLE SPASM: ICD-10-CM

## 2024-10-11 DIAGNOSIS — M62.830 MUSCLE SPASM OF BACK: ICD-10-CM

## 2024-10-11 RX ORDER — CYCLOBENZAPRINE HYDROCHLORIDE 10 MG/1
10 TABLET, FILM COATED ORAL EVERY 8 HOURS
Qty: 30 | Refills: 0 | Status: ACTIVE | COMMUNITY
Start: 2024-10-11 | End: 1900-01-01

## 2024-10-11 RX ORDER — METHYLPREDNISOLONE 4 MG/1
4 TABLET ORAL
Qty: 21 | Refills: 1 | Status: ACTIVE | COMMUNITY
Start: 2024-10-11 | End: 1900-01-01

## 2024-10-14 ENCOUNTER — RX RENEWAL (OUTPATIENT)
Age: 54
End: 2024-10-14

## 2025-01-07 NOTE — ASU PATIENT PROFILE, ADULT - PAIN RATING AT ACTIVITY
"The patient called because she needs a referral to the orthopedist for pain in her arm \"Right shoulder pain, unspecified chronicity\" and she can hardly use it. The patient reports that she had been given a referral at the hospital but it no longer works and she wants a new referral.     the patient also reports that she spoke with prosper from the plastic surgeon's office and was asked a series of questions including whether she had received physical therapy in the past and had visited a cardiopathic doctor. the patient reports that she does not understand why she is being asked these series of questions and that she cannot do physical therapy or visit a cardiopathic doctor because of her osteoporosis. the patient would like dr. holloway to refer her to another surgeon or write a letter stating that she cannot visit these two specialties.     Please Advice   " 10

## 2025-01-27 ENCOUNTER — RX RENEWAL (OUTPATIENT)
Age: 55
End: 2025-01-27

## 2025-03-21 ENCOUNTER — APPOINTMENT (OUTPATIENT)
Dept: INTERNAL MEDICINE | Facility: CLINIC | Age: 55
End: 2025-03-21
Payer: MEDICARE

## 2025-03-21 VITALS — WEIGHT: 201 LBS | HEIGHT: 62 IN | BODY MASS INDEX: 36.99 KG/M2

## 2025-03-21 DIAGNOSIS — M54.9 CERVICALGIA: ICD-10-CM

## 2025-03-21 DIAGNOSIS — E66.01 MORBID (SEVERE) OBESITY DUE TO EXCESS CALORIES: ICD-10-CM

## 2025-03-21 DIAGNOSIS — M54.2 CERVICALGIA: ICD-10-CM

## 2025-03-21 DIAGNOSIS — N64.4 MASTODYNIA: ICD-10-CM

## 2025-03-21 DIAGNOSIS — G89.29 CERVICALGIA: ICD-10-CM

## 2025-03-21 DIAGNOSIS — M75.00 ADHESIVE CAPSULITIS OF UNSPECIFIED SHOULDER: ICD-10-CM

## 2025-03-21 PROCEDURE — 99214 OFFICE O/P EST MOD 30 MIN: CPT

## 2025-03-21 PROCEDURE — G2211 COMPLEX E/M VISIT ADD ON: CPT

## 2025-05-05 ENCOUNTER — NON-APPOINTMENT (OUTPATIENT)
Age: 55
End: 2025-05-05

## 2025-05-07 ENCOUNTER — APPOINTMENT (OUTPATIENT)
Dept: FAMILY MEDICINE | Facility: CLINIC | Age: 55
End: 2025-05-07
Payer: MEDICARE

## 2025-05-07 DIAGNOSIS — F41.1 GENERALIZED ANXIETY DISORDER: ICD-10-CM

## 2025-05-07 DIAGNOSIS — F32.A DEPRESSION, UNSPECIFIED: ICD-10-CM

## 2025-05-07 DIAGNOSIS — E66.01 MORBID (SEVERE) OBESITY DUE TO EXCESS CALORIES: ICD-10-CM

## 2025-05-07 PROCEDURE — 99215 OFFICE O/P EST HI 40 MIN: CPT | Mod: 2W

## 2025-05-13 ENCOUNTER — RX RENEWAL (OUTPATIENT)
Age: 55
End: 2025-05-13

## 2025-05-15 RX ORDER — TIRZEPATIDE 2.5 MG/.5ML
2.5 INJECTION, SOLUTION SUBCUTANEOUS
Qty: 1 | Refills: 0 | Status: ACTIVE | COMMUNITY
Start: 2025-05-07

## 2025-06-02 NOTE — ED PROVIDER NOTE - BIRTH SEX
Gentle Skin Care    Below is a list of products our providers recommend for gentle skin care.    Moisturizers:  Lighter: Exederm Intensive Moisture Cream, Cetaphil Cream, CeraVe, Aveeno Positively Radiant and Vanicream Light   Thicker: Aquaphor Ointment, Vaseline, Petroleum Jelly, Eucerin Original Healing Cream, Vanicream Cream, CeraVe Healing Ointment, Aquaphor Body Spray  Avoid Lotions (too thin)  Mild Cleansers:  Dove Fragrance-free Bar or Wash  CeraVe   Vanicream Cleansing Bar  Cetaphil Cleanser   Aquaphor 2-in-1 Gentle Wash and Shampoo  Dove Baby Wash  Exederm Body Wash       Laundry Products:  All Free and Clear Products  Cheer Free  Generic brands are okay as long as they are fragrance-free  Avoid fabric softeners and dryer sheets   Sunscreens: SPF 30 or greater   Sunscreens that contain zinc oxide and/or titanium dioxide should be applied. These are physical blockers. One or both of these should be listed in the active Ingredients.  Any other listed ingredients under the active ingredients would be a chemically-based sunscreen, which might be irritating.  Spray sunscreens should be avoided because these are typically chemical sunscreens.      Shampoo and Conditioners:  Free and Clear by Vanicream  Aquaphor 2-in-1 Gentle Wash and Shampoo   Oils:  Mineral Oil   Emu Oil   For some patients: coconut (raw, unrefined, organic) and sunflower seed oil      Keep in mind:  Generic products are an okay substitute, but make sure they are fragrance-free.  Reading the product ingredients list is very important.  Avoid product that have fragrance added to them.   Organic does not mean fragrance-free. In fact, patients with sensitive skin can become quite irritated by some organic products.     Recommendations:  Daily bathing. Make sure you are applying a good moisturizer after bathing every time.  Apply moisturizing creams at least twice daily to the whole body. Your provider may recommend a lighter or heavier  moisturizer based on the severity of your skin condition and that time of year it is.  Creams are more moisturizing than lotions.     Care Plan:  Keep bathing and showering short, less than 15 minutes.  Always use lukewarm warm when possible. AVOID hot or cold water.  DO NOT use bubble bath.  Limit the use of soaps. Focus on skin folds, face, armpits, groin, and feet towards the end of the bath.  Do NOT vigorously scrub when you cleanse the skin.  After bathing, PAT your skin lightly with a towel. DO NOT rub or scrub when drying.  ALWAYS apply a moisturizer immediately after bathing. This helps to lock in moisture. *IF YOU WERE PRESCRIBED A TOPICAL MEDICATION, APPLY YOUR MEDICATION FIRST, AND THEN COVER WITH YOUR DAILY MOISTURIZER.*  Reapply moisturizing agents to your whole body at least twice daily.    Other helpful tips:  Do not use products such as powders, perfumes, or colognes on your skin.  Diffusers can be harsh on sensitive skin. Use with caution if you have sensitive skin.  Avoid saunas and steam baths. This temperature is too hot.  Avoid tight or scratchy clothing, such as wool.  Always wash new clothing before wearing them for the first time.  Sometimes a humidifier or vaporizer can be used at night can help the dry skin. Remember to keep these items clean to avoid mold growth.    Who should I call with questions?  [facility]: [phone]  [facility]: [phone]  For urgent needs outside of business hours call the [facility] at [phone] and ask for the dermatology resident on call.    Female

## 2025-06-16 ENCOUNTER — APPOINTMENT (OUTPATIENT)
Dept: INTERNAL MEDICINE | Facility: CLINIC | Age: 55
End: 2025-06-16
Payer: MEDICARE

## 2025-06-16 VITALS — SYSTOLIC BLOOD PRESSURE: 118 MMHG | HEART RATE: 68 BPM | DIASTOLIC BLOOD PRESSURE: 78 MMHG | RESPIRATION RATE: 16 BRPM

## 2025-06-16 VITALS — BODY MASS INDEX: 36.25 KG/M2 | HEIGHT: 62 IN | WEIGHT: 197 LBS

## 2025-06-16 PROBLEM — M25.561 RIGHT KNEE PAIN, UNSPECIFIED CHRONICITY: Status: ACTIVE | Noted: 2025-06-16

## 2025-06-16 PROCEDURE — 36415 COLL VENOUS BLD VENIPUNCTURE: CPT

## 2025-06-16 PROCEDURE — 99213 OFFICE O/P EST LOW 20 MIN: CPT | Mod: 25

## 2025-06-16 PROCEDURE — G0439: CPT

## 2025-06-16 PROCEDURE — G2211 COMPLEX E/M VISIT ADD ON: CPT

## 2025-06-16 PROCEDURE — 93000 ELECTROCARDIOGRAM COMPLETE: CPT

## 2025-06-16 RX ORDER — MELOXICAM 7.5 MG/1
7.5 TABLET ORAL DAILY
Qty: 30 | Refills: 1 | Status: ACTIVE | COMMUNITY
Start: 2025-06-16 | End: 1900-01-01

## 2025-06-17 LAB
ALBUMIN SERPL ELPH-MCNC: 3.9 G/DL
ALP BLD-CCNC: 148 U/L
ALT SERPL-CCNC: 13 U/L
ANION GAP SERPL CALC-SCNC: 14 MMOL/L
APPEARANCE: CLEAR
AST SERPL-CCNC: 17 U/L
BACTERIA: ABNORMAL /HPF
BASOPHILS # BLD AUTO: 0.06 K/UL
BASOPHILS NFR BLD AUTO: 0.9 %
BILIRUB SERPL-MCNC: 0.3 MG/DL
BILIRUBIN URINE: NEGATIVE
BLOOD URINE: NEGATIVE
BUN SERPL-MCNC: 11 MG/DL
CALCIUM SERPL-MCNC: 9.8 MG/DL
CAST: 0 /LPF
CHLORIDE SERPL-SCNC: 105 MMOL/L
CHOLEST SERPL-MCNC: 193 MG/DL
CO2 SERPL-SCNC: 22 MMOL/L
COLOR: YELLOW
CREAT SERPL-MCNC: 1.02 MG/DL
CREAT SPEC-SCNC: 132 MG/DL
EGFRCR SERPLBLD CKD-EPI 2021: 65 ML/MIN/1.73M2
EOSINOPHIL # BLD AUTO: 0.1 K/UL
EOSINOPHIL NFR BLD AUTO: 1.5 %
EPITHELIAL CELLS: 2 /HPF
ESTIMATED AVERAGE GLUCOSE: 117 MG/DL
GLUCOSE QUALITATIVE U: NEGATIVE MG/DL
GLUCOSE SERPL-MCNC: 87 MG/DL
HBA1C MFR BLD HPLC: 5.7 %
HCT VFR BLD CALC: 40.5 %
HDLC SERPL-MCNC: 52 MG/DL
HGB BLD-MCNC: 12.2 G/DL
IMM GRANULOCYTES NFR BLD AUTO: 0.2 %
KETONES URINE: NEGATIVE MG/DL
LDLC SERPL-MCNC: 123 MG/DL
LEUKOCYTE ESTERASE URINE: ABNORMAL
LYMPHOCYTES # BLD AUTO: 2.99 K/UL
LYMPHOCYTES NFR BLD AUTO: 46 %
MAN DIFF?: NORMAL
MCHC RBC-ENTMCNC: 24.9 PG
MCHC RBC-ENTMCNC: 30.1 G/DL
MCV RBC AUTO: 82.8 FL
MICROALBUMIN 24H UR DL<=1MG/L-MCNC: <1.2 MG/DL
MICROALBUMIN/CREAT 24H UR-RTO: NORMAL MG/G
MICROSCOPIC-UA: NORMAL
MONOCYTES # BLD AUTO: 0.75 K/UL
MONOCYTES NFR BLD AUTO: 11.5 %
NEUTROPHILS # BLD AUTO: 2.59 K/UL
NEUTROPHILS NFR BLD AUTO: 39.9 %
NITRITE URINE: POSITIVE
NONHDLC SERPL-MCNC: 140 MG/DL
PH URINE: 6
PLATELET # BLD AUTO: 364 K/UL
POTASSIUM SERPL-SCNC: 4.3 MMOL/L
PROT SERPL-MCNC: 6.9 G/DL
PROTEIN URINE: NEGATIVE MG/DL
RBC # BLD: 4.89 M/UL
RBC # FLD: 16.9 %
RED BLOOD CELLS URINE: 1 /HPF
SODIUM SERPL-SCNC: 141 MMOL/L
SPECIFIC GRAVITY URINE: 1.01
T4 FREE SERPL-MCNC: 0.8 NG/DL
TRIGL SERPL-MCNC: 99 MG/DL
TSH SERPL-ACNC: 1.56 UIU/ML
UROBILINOGEN URINE: 1 MG/DL
WBC # FLD AUTO: 6.5 K/UL
WHITE BLOOD CELLS URINE: 43 /HPF

## 2025-06-24 NOTE — ASU PATIENT PROFILE, ADULT - FALL HARM RISK - UNIVERSAL INTERVENTIONS
[facility]  Phone: [phone] Fax: [phone]       Physical Therapy Daily Treatment Note  Date:  2025    Patient Name:  Olga Felix    :  1985  MRN: [id number]    Restrictions/Precautions:    Diagnosis:  Neck and Back Pain   Treatment Diagnosis:    Insurance/Certification information: [employer]    Referring Physician:    Plan of care signed (Y/N):    Visit# / total visits:  3  Pain level: 10  Time In:     715   Time Out:   815       Subjective:      Exercises:  Exercise/Equipment Resistance/Repetitions Other comments     Bike 10 min             \"L\" Str  5 reps      Lateral trunk str  5 reps bilateral      Standing Hip/knee flex  5 reps bilateral  Step     Seated IT band/piriformis str  5 reps bilateral     Heel squeeze   5 reps                                                                                              Other Therapeutic Activities:      Home Exercise Program:  Access Code: [email or web address]  URL: [email or web address]  Date: 2025  Prepared by: Gilbert Maynard    Exercises  - Supine Bridge  - 1-2 x daily - 7 x weekly - 1 sets - 5-10 reps - 3 hold  - Cat Cow  - 1-2 x daily - 7 x weekly - 1 sets - 10 reps  - Child's Pose Stretch  - 1-2 x daily - 7 x weekly - 1 sets - 5-10 reps - 3 hold  - Supine 90/90 Alternating Toe Touch  - 1-2 x daily - 7 x weekly - 1 sets - 5-10 reps  - Prone Press Up On Elbows  - 1-2 x daily - 7 x weekly - 1 sets - 5-10 reps - 3 hold  - Standing 'L' Stretch at Counter  - 1-2 x daily - 7 x weekly - 1 sets - 5-10 reps - 3 hold  - Hooklying Isometric Hip Flexion  - 1-2 x daily - 7 x weekly - 1 sets - 5-10 reps - 3 hold  - Squat with Chair Touch  - 1-2 x daily - 7 x weekly - 1 sets - 5-10 reps - 3 hold  - Seated Piriformis Stretch with Trunk Bend  - 1-2 x daily - 7 x weekly - 1 sets - 5-10 reps - 3 hold  - Seated Piriformis Stretch  - 1 x daily - 7 x weekly - 3 sets - 10 reps    Manual Treatments:      Modalities:  Estim  Bed in lowest position, wheels locked, appropriate side rails in place/Call bell, personal items and telephone in reach/Instruct patient to call for assistance before getting out of bed or chair/Non-slip footwear when patient is out of bed/Alfred to call system/Physically safe environment - no spills, clutter or unnecessary equipment/Purposeful Proactive Rounding/Room/bathroom lighting operational, light cord in reach

## 2025-07-21 ENCOUNTER — APPOINTMENT (OUTPATIENT)
Dept: RADIOLOGY | Facility: CLINIC | Age: 55
End: 2025-07-21
Payer: MEDICARE

## 2025-07-21 ENCOUNTER — APPOINTMENT (OUTPATIENT)
Dept: MAMMOGRAPHY | Facility: CLINIC | Age: 55
End: 2025-07-21
Payer: MEDICARE

## 2025-07-21 ENCOUNTER — RESULT REVIEW (OUTPATIENT)
Age: 55
End: 2025-07-21

## 2025-07-21 ENCOUNTER — OUTPATIENT (OUTPATIENT)
Dept: OUTPATIENT SERVICES | Facility: HOSPITAL | Age: 55
LOS: 1 days | End: 2025-07-21
Payer: MEDICARE

## 2025-07-21 DIAGNOSIS — Z98.89 OTHER SPECIFIED POSTPROCEDURAL STATES: Chronic | ICD-10-CM

## 2025-07-21 DIAGNOSIS — Z90.710 ACQUIRED ABSENCE OF BOTH CERVIX AND UTERUS: Chronic | ICD-10-CM

## 2025-07-21 DIAGNOSIS — M25.561 PAIN IN RIGHT KNEE: ICD-10-CM

## 2025-07-21 DIAGNOSIS — Z90.3 ACQUIRED ABSENCE OF STOMACH [PART OF]: Chronic | ICD-10-CM

## 2025-07-21 DIAGNOSIS — Z98.51 TUBAL LIGATION STATUS: Chronic | ICD-10-CM

## 2025-07-21 PROCEDURE — 77067 SCR MAMMO BI INCL CAD: CPT | Mod: 26

## 2025-07-21 PROCEDURE — 77063 BREAST TOMOSYNTHESIS BI: CPT | Mod: 26

## 2025-07-21 PROCEDURE — 77067 SCR MAMMO BI INCL CAD: CPT

## 2025-07-21 PROCEDURE — 73562 X-RAY EXAM OF KNEE 3: CPT | Mod: 26,RT

## 2025-07-21 PROCEDURE — 77063 BREAST TOMOSYNTHESIS BI: CPT

## 2025-07-21 PROCEDURE — 73562 X-RAY EXAM OF KNEE 3: CPT

## 2025-07-22 DIAGNOSIS — R92.8 OTHER ABNORMAL AND INCONCLUSIVE FINDINGS ON DIAGNOSTIC IMAGING OF BREAST: ICD-10-CM

## 2025-08-26 ENCOUNTER — APPOINTMENT (OUTPATIENT)
Dept: ULTRASOUND IMAGING | Facility: CLINIC | Age: 55
End: 2025-08-26
Payer: MEDICARE

## 2025-08-26 ENCOUNTER — RESULT REVIEW (OUTPATIENT)
Age: 55
End: 2025-08-26

## 2025-08-26 ENCOUNTER — APPOINTMENT (OUTPATIENT)
Dept: MAMMOGRAPHY | Facility: CLINIC | Age: 55
End: 2025-08-26
Payer: MEDICARE

## 2025-08-26 ENCOUNTER — OUTPATIENT (OUTPATIENT)
Dept: OUTPATIENT SERVICES | Facility: HOSPITAL | Age: 55
LOS: 1 days | End: 2025-08-26
Payer: MEDICARE

## 2025-08-26 DIAGNOSIS — Z98.89 OTHER SPECIFIED POSTPROCEDURAL STATES: Chronic | ICD-10-CM

## 2025-08-26 DIAGNOSIS — R92.8 OTHER ABNORMAL AND INCONCLUSIVE FINDINGS ON DIAGNOSTIC IMAGING OF BREAST: ICD-10-CM

## 2025-08-26 DIAGNOSIS — Z90.3 ACQUIRED ABSENCE OF STOMACH [PART OF]: Chronic | ICD-10-CM

## 2025-08-26 DIAGNOSIS — Z98.51 TUBAL LIGATION STATUS: Chronic | ICD-10-CM

## 2025-08-26 DIAGNOSIS — Z90.710 ACQUIRED ABSENCE OF BOTH CERVIX AND UTERUS: Chronic | ICD-10-CM

## 2025-08-26 PROCEDURE — G0279: CPT | Mod: 26

## 2025-08-26 PROCEDURE — 76642 ULTRASOUND BREAST LIMITED: CPT | Mod: 26,LT

## 2025-08-26 PROCEDURE — 77065 DX MAMMO INCL CAD UNI: CPT

## 2025-08-26 PROCEDURE — 76642 ULTRASOUND BREAST LIMITED: CPT

## 2025-08-26 PROCEDURE — 77065 DX MAMMO INCL CAD UNI: CPT | Mod: 26,LT

## 2025-08-26 PROCEDURE — G0279: CPT

## 2025-09-12 ENCOUNTER — APPOINTMENT (OUTPATIENT)
Dept: ORTHOPEDIC SURGERY | Facility: CLINIC | Age: 55
End: 2025-09-12
Payer: MEDICARE

## 2025-09-12 VITALS — BODY MASS INDEX: 35.88 KG/M2 | HEIGHT: 62 IN | WEIGHT: 195 LBS

## 2025-09-12 PROCEDURE — 99213 OFFICE O/P EST LOW 20 MIN: CPT

## 2025-09-12 RX ORDER — DICLOFENAC SODIUM 10 MG/G
1 GEL TOPICAL DAILY
Qty: 56 | Refills: 0 | Status: ACTIVE | COMMUNITY
Start: 2025-09-12 | End: 1900-01-01

## 2025-09-12 RX ORDER — METHYLPREDNISOLONE 4 MG/1
4 TABLET ORAL
Qty: 1 | Refills: 0 | Status: ACTIVE | COMMUNITY
Start: 2025-09-12 | End: 1900-01-01

## 2025-09-17 ENCOUNTER — APPOINTMENT (OUTPATIENT)
Dept: RHEUMATOLOGY | Facility: CLINIC | Age: 55
End: 2025-09-17
Payer: MEDICARE

## 2025-09-17 VITALS
DIASTOLIC BLOOD PRESSURE: 74 MMHG | BODY MASS INDEX: 35.88 KG/M2 | HEART RATE: 68 BPM | WEIGHT: 195 LBS | OXYGEN SATURATION: 100 % | TEMPERATURE: 98.1 F | HEIGHT: 62 IN | SYSTOLIC BLOOD PRESSURE: 124 MMHG

## 2025-09-17 DIAGNOSIS — M54.12 RADICULOPATHY, CERVICAL REGION: ICD-10-CM

## 2025-09-17 DIAGNOSIS — M25.511 PAIN IN RIGHT SHOULDER: ICD-10-CM

## 2025-09-17 PROCEDURE — 99214 OFFICE O/P EST MOD 30 MIN: CPT

## 2025-09-17 PROCEDURE — G2211 COMPLEX E/M VISIT ADD ON: CPT

## 2025-09-17 RX ORDER — PREDNISONE 5 MG/1
5 TABLET ORAL
Qty: 30 | Refills: 0 | Status: ACTIVE | COMMUNITY
Start: 2025-09-17 | End: 1900-01-01

## (undated) DEVICE — WARMING BLANKET FULL ADULT

## (undated) DEVICE — KIT DEFENDO 4 OLY 4 PC

## (undated) DEVICE — TRAY EPIDURAL SINGLE DOSE

## (undated) DEVICE — DRSG CURITY GAUZE SPONGE 4 X 4" 12-PLY NON-STERILE

## (undated) DEVICE — CATH IV SAFE BC 22G X 1" (BLUE)

## (undated) DEVICE — BITE BLOCK ADULT 20 X 27MM (GREEN)

## (undated) DEVICE — SOL INJ LR 500ML

## (undated) DEVICE — UNDERPAD LINEN SAVER 23 X 36"

## (undated) DEVICE — GOWN IMPERV XL

## (undated) DEVICE — SYR IV FLUSH SALINE 10ML 30/TY

## (undated) DEVICE — SENSOR O2 FINGER ADULT

## (undated) DEVICE — CATH IV SAFE BC BLU 22GAX1.0"

## (undated) DEVICE — VENODYNE/SCD SLEEVE CALF MEDIUM

## (undated) DEVICE — CSC-COLONOSCOPE 2002772: Type: DURABLE MEDICAL EQUIPMENT

## (undated) DEVICE — PACK IV START WITH CHG

## (undated) DEVICE — FORCEP BIOPSY ENDOSCOPIC 2.8MM DISP

## (undated) DEVICE — DENTURE CUP PINK

## (undated) DEVICE — SYR LUER SLIP TIP 50CC

## (undated) DEVICE — NDL SPINAL 22G X 3.5" QUINCKE

## (undated) DEVICE — GLV 8 ULTRAFREE MAX

## (undated) DEVICE — SOL BAG NS 0.9% 1000ML

## (undated) DEVICE — SYR SLIP 10CC

## (undated) DEVICE — SOL IRR BAG NS 0.9% 1000ML

## (undated) DEVICE — STYLET  ENDOTRACH 7.5MM X 10MM

## (undated) DEVICE — FORCEP RADIAL JAW 4 W NDL 2.4MM 2.8MM 240CM ORANGE DISP

## (undated) DEVICE — CATH IV SAFE 24GX3/4

## (undated) DEVICE — TUBING ALARIS PUMP MODULE NON-DEHP

## (undated) DEVICE — DRSG 2X2

## (undated) DEVICE — GLV 8.5 PROTEXIS (WHITE)

## (undated) DEVICE — GLV 7.5 ULTRAFREE MAX

## (undated) DEVICE — TUBING IV EXTENSION MACRO W CLAVE 7"

## (undated) DEVICE — MASK PROCED EARLOOP 50/BX LRC COVID ADD

## (undated) DEVICE — NDL SPNL WHIT 22GX3.5IN

## (undated) DEVICE — SOL IRR BAG H2O 1000ML